# Patient Record
Sex: FEMALE | Race: WHITE | NOT HISPANIC OR LATINO | URBAN - METROPOLITAN AREA
[De-identification: names, ages, dates, MRNs, and addresses within clinical notes are randomized per-mention and may not be internally consistent; named-entity substitution may affect disease eponyms.]

---

## 2017-02-05 ENCOUNTER — EMERGENCY (EMERGENCY)
Facility: HOSPITAL | Age: 72
LOS: 1 days | Discharge: HOME | End: 2017-02-05
Attending: EMERGENCY MEDICINE
Payer: MEDICARE

## 2017-02-05 VITALS
SYSTOLIC BLOOD PRESSURE: 173 MMHG | HEART RATE: 72 BPM | DIASTOLIC BLOOD PRESSURE: 112 MMHG | TEMPERATURE: 97 F | RESPIRATION RATE: 20 BRPM | OXYGEN SATURATION: 100 %

## 2017-02-05 VITALS — WEIGHT: 139.99 LBS

## 2017-02-05 DIAGNOSIS — R20.2 PARESTHESIA OF SKIN: ICD-10-CM

## 2017-02-05 DIAGNOSIS — R79.82 ELEVATED C-REACTIVE PROTEIN (CRP): ICD-10-CM

## 2017-02-05 DIAGNOSIS — Z79.899 OTHER LONG TERM (CURRENT) DRUG THERAPY: ICD-10-CM

## 2017-02-05 DIAGNOSIS — R20.0 ANESTHESIA OF SKIN: ICD-10-CM

## 2017-02-05 DIAGNOSIS — R51 HEADACHE: ICD-10-CM

## 2017-02-05 DIAGNOSIS — Z98.2 PRESENCE OF CEREBROSPINAL FLUID DRAINAGE DEVICE: Chronic | ICD-10-CM

## 2017-02-05 DIAGNOSIS — Z98.890 OTHER SPECIFIED POSTPROCEDURAL STATES: ICD-10-CM

## 2017-02-05 LAB
ALBUMIN SERPL ELPH-MCNC: 4.4 G/DL — SIGNIFICANT CHANGE UP (ref 3.3–5.2)
ALP SERPL-CCNC: 56 U/L — SIGNIFICANT CHANGE UP (ref 40–120)
ALT FLD-CCNC: 14 U/L — SIGNIFICANT CHANGE UP
ANION GAP SERPL CALC-SCNC: 11 MMOL/L — SIGNIFICANT CHANGE UP (ref 5–17)
APTT BLD: 31.8 SEC — SIGNIFICANT CHANGE UP (ref 27.5–37.4)
AST SERPL-CCNC: 15 U/L — SIGNIFICANT CHANGE UP
BASOPHILS # BLD AUTO: 0 K/UL — SIGNIFICANT CHANGE UP (ref 0–0.2)
BASOPHILS NFR BLD AUTO: 0.8 % — SIGNIFICANT CHANGE UP (ref 0–2)
BILIRUB SERPL-MCNC: 0.3 MG/DL — LOW (ref 0.4–2)
BUN SERPL-MCNC: 18 MG/DL — SIGNIFICANT CHANGE UP (ref 8–20)
CALCIUM SERPL-MCNC: 10.6 MG/DL — HIGH (ref 8.6–10.2)
CHLORIDE SERPL-SCNC: 101 MMOL/L — SIGNIFICANT CHANGE UP (ref 98–107)
CO2 SERPL-SCNC: 30 MMOL/L — HIGH (ref 22–29)
CREAT SERPL-MCNC: 0.63 MG/DL — SIGNIFICANT CHANGE UP (ref 0.5–1.3)
EOSINOPHIL # BLD AUTO: 0.1 K/UL — SIGNIFICANT CHANGE UP (ref 0–0.5)
EOSINOPHIL NFR BLD AUTO: 1.9 % — SIGNIFICANT CHANGE UP (ref 0–6)
GLUCOSE SERPL-MCNC: 90 MG/DL — SIGNIFICANT CHANGE UP (ref 70–115)
HCT VFR BLD CALC: 41.2 % — SIGNIFICANT CHANGE UP (ref 37–47)
HGB BLD-MCNC: 14.5 G/DL — SIGNIFICANT CHANGE UP (ref 12–16)
INR BLD: 0.99 RATIO — SIGNIFICANT CHANGE UP (ref 0.88–1.16)
LYMPHOCYTES # BLD AUTO: 1.2 K/UL — SIGNIFICANT CHANGE UP (ref 1–4.8)
LYMPHOCYTES # BLD AUTO: 23.6 % — SIGNIFICANT CHANGE UP (ref 20–55)
MCHC RBC-ENTMCNC: 29.9 PG — SIGNIFICANT CHANGE UP (ref 27–31)
MCHC RBC-ENTMCNC: 35.2 G/DL — SIGNIFICANT CHANGE UP (ref 32–36)
MCV RBC AUTO: 84.9 FL — SIGNIFICANT CHANGE UP (ref 81–99)
MONOCYTES # BLD AUTO: 0.4 K/UL — SIGNIFICANT CHANGE UP (ref 0–0.8)
MONOCYTES NFR BLD AUTO: 7.5 % — SIGNIFICANT CHANGE UP (ref 3–10)
NEUTROPHILS # BLD AUTO: 3.4 K/UL — SIGNIFICANT CHANGE UP (ref 1.8–8)
NEUTROPHILS NFR BLD AUTO: 66.2 % — SIGNIFICANT CHANGE UP (ref 37–73)
PLATELET # BLD AUTO: 277 K/UL — SIGNIFICANT CHANGE UP (ref 150–400)
POTASSIUM SERPL-MCNC: 3.7 MMOL/L — SIGNIFICANT CHANGE UP (ref 3.5–5.3)
POTASSIUM SERPL-SCNC: 3.7 MMOL/L — SIGNIFICANT CHANGE UP (ref 3.5–5.3)
PROT SERPL-MCNC: 7.4 G/DL — SIGNIFICANT CHANGE UP (ref 6.6–8.7)
PROTHROM AB SERPL-ACNC: 10.9 SEC — SIGNIFICANT CHANGE UP (ref 10–13.1)
RBC # BLD: 4.85 M/UL — SIGNIFICANT CHANGE UP (ref 4.4–5.2)
RBC # FLD: 13 % — SIGNIFICANT CHANGE UP (ref 11–15.6)
SODIUM SERPL-SCNC: 142 MMOL/L — SIGNIFICANT CHANGE UP (ref 135–145)
WBC # BLD: 5.17 K/UL — SIGNIFICANT CHANGE UP (ref 4.8–10.8)
WBC # FLD AUTO: 5.17 K/UL — SIGNIFICANT CHANGE UP (ref 4.8–10.8)

## 2017-02-05 PROCEDURE — 71010: CPT | Mod: 26

## 2017-02-05 PROCEDURE — 70450 CT HEAD/BRAIN W/O DYE: CPT | Mod: 26

## 2017-02-05 PROCEDURE — 99284 EMERGENCY DEPT VISIT MOD MDM: CPT

## 2017-02-05 RX ORDER — ACETAMINOPHEN 500 MG
975 TABLET ORAL ONCE
Qty: 0 | Refills: 0 | Status: COMPLETED | OUTPATIENT
Start: 2017-02-05 | End: 2017-02-05

## 2017-02-05 RX ADMIN — Medication 975 MILLIGRAM(S): at 17:08

## 2017-02-05 NOTE — ED PROVIDER NOTE - PROGRESS NOTE DETAILS
case discussed with dr urena of neurology; pt to be discharged with close follow up with pmd, neurology and neurosurgery dr perez

## 2017-02-05 NOTE — ED PROVIDER NOTE - OBJECTIVE STATEMENT
70 yo female pmh  shunt comes to ed with headache and numbness of  rt hand and b/l thighs for 2 days; pt noted by daughter having numbness of hands in past; ;pt denies any trauma, chest pain or abdominal pain

## 2017-02-05 NOTE — ED ADULT NURSE NOTE - OBJECTIVE STATEMENT
pt presents to ED with dizziness. head pressure and numbness to her right hand last night, pt states same episode happened this afternoon so she came to ED. pt has hx of  shunt. pt denies n/v. no numbness/tingling noted at this time.  +b/l equal motor strengths noted to upper and lower extremities. no facial droop noted. no slurred speech noted. a&ox3 PERRL>  maex4 lungs cta denies chest pain/sob. breathing si even and unlabored. skin w.d.i, will continue to monitor and reassess

## 2017-02-05 NOTE — ED ADULT NURSE NOTE - NS ED NURSE DC INFO COMPLEXITY
Returned Demonstration/Simple: Patient demonstrates quick and easy understanding/Patient asked questions/Verbalized Understanding

## 2017-02-05 NOTE — ED ADULT TRIAGE NOTE - CHIEF COMPLAINT QUOTE
rt hand numbness, both legs heavy, weak. has  shunt hydrocephalus.  head pressure, s/s x 2 days. CVA HX. no chest pain, no dyspnea, pt is alert. from clinic. [facility]

## 2017-02-07 ENCOUNTER — EMERGENCY (EMERGENCY)
Facility: HOSPITAL | Age: 72
LOS: 1 days | Discharge: HOME | End: 2017-02-07
Attending: EMERGENCY MEDICINE
Payer: MEDICARE

## 2017-02-07 VITALS
TEMPERATURE: 98 F | SYSTOLIC BLOOD PRESSURE: 159 MMHG | DIASTOLIC BLOOD PRESSURE: 85 MMHG | RESPIRATION RATE: 18 BRPM | OXYGEN SATURATION: 100 % | HEIGHT: 60 IN | WEIGHT: 143.08 LBS | HEART RATE: 66 BPM

## 2017-02-07 DIAGNOSIS — F41.9 ANXIETY DISORDER, UNSPECIFIED: ICD-10-CM

## 2017-02-07 DIAGNOSIS — R06.4 HYPERVENTILATION: ICD-10-CM

## 2017-02-07 DIAGNOSIS — Z98.2 PRESENCE OF CEREBROSPINAL FLUID DRAINAGE DEVICE: Chronic | ICD-10-CM

## 2017-02-07 DIAGNOSIS — R53.1 WEAKNESS: ICD-10-CM

## 2017-02-07 DIAGNOSIS — Z79.82 LONG TERM (CURRENT) USE OF ASPIRIN: ICD-10-CM

## 2017-02-07 LAB
ALBUMIN SERPL ELPH-MCNC: 4.3 G/DL — SIGNIFICANT CHANGE UP (ref 3.3–5.2)
ALP SERPL-CCNC: 54 U/L — SIGNIFICANT CHANGE UP (ref 40–120)
ALT FLD-CCNC: 13 U/L — SIGNIFICANT CHANGE UP
ANION GAP SERPL CALC-SCNC: 13 MMOL/L — SIGNIFICANT CHANGE UP (ref 5–17)
APPEARANCE UR: CLEAR — SIGNIFICANT CHANGE UP
APTT BLD: 31.3 SEC — SIGNIFICANT CHANGE UP (ref 27.5–37.4)
AST SERPL-CCNC: 13 U/L — SIGNIFICANT CHANGE UP
BACTERIA # UR AUTO: ABNORMAL
BASOPHILS # BLD AUTO: 0 K/UL — SIGNIFICANT CHANGE UP (ref 0–0.2)
BASOPHILS NFR BLD AUTO: 0.2 % — SIGNIFICANT CHANGE UP (ref 0–2)
BILIRUB SERPL-MCNC: 0.5 MG/DL — SIGNIFICANT CHANGE UP (ref 0.4–2)
BILIRUB UR-MCNC: NEGATIVE — SIGNIFICANT CHANGE UP
BUN SERPL-MCNC: 16 MG/DL — SIGNIFICANT CHANGE UP (ref 8–20)
CALCIUM SERPL-MCNC: 10.5 MG/DL — HIGH (ref 8.6–10.2)
CHLORIDE SERPL-SCNC: 99 MMOL/L — SIGNIFICANT CHANGE UP (ref 98–107)
CO2 SERPL-SCNC: 29 MMOL/L — SIGNIFICANT CHANGE UP (ref 22–29)
COLOR SPEC: YELLOW — SIGNIFICANT CHANGE UP
CREAT SERPL-MCNC: 0.67 MG/DL — SIGNIFICANT CHANGE UP (ref 0.5–1.3)
DIFF PNL FLD: NEGATIVE — SIGNIFICANT CHANGE UP
EOSINOPHIL # BLD AUTO: 0 K/UL — SIGNIFICANT CHANGE UP (ref 0–0.5)
EOSINOPHIL NFR BLD AUTO: 0.5 % — SIGNIFICANT CHANGE UP (ref 0–6)
EPI CELLS # UR: SIGNIFICANT CHANGE UP
GLUCOSE SERPL-MCNC: 98 MG/DL — SIGNIFICANT CHANGE UP (ref 70–115)
GLUCOSE UR QL: NEGATIVE MG/DL — SIGNIFICANT CHANGE UP
HCT VFR BLD CALC: 41.6 % — SIGNIFICANT CHANGE UP (ref 37–47)
HGB BLD-MCNC: 13.9 G/DL — SIGNIFICANT CHANGE UP (ref 12–16)
INR BLD: 1 RATIO — SIGNIFICANT CHANGE UP (ref 0.88–1.16)
KETONES UR-MCNC: NEGATIVE — SIGNIFICANT CHANGE UP
LEUKOCYTE ESTERASE UR-ACNC: ABNORMAL
LYMPHOCYTES # BLD AUTO: 1.1 K/UL — SIGNIFICANT CHANGE UP (ref 1–4.8)
LYMPHOCYTES # BLD AUTO: 19.1 % — LOW (ref 20–55)
MCHC RBC-ENTMCNC: 29.3 PG — SIGNIFICANT CHANGE UP (ref 27–31)
MCHC RBC-ENTMCNC: 33.4 G/DL — SIGNIFICANT CHANGE UP (ref 32–36)
MCV RBC AUTO: 87.8 FL — SIGNIFICANT CHANGE UP (ref 81–99)
MONOCYTES # BLD AUTO: 0.5 K/UL — SIGNIFICANT CHANGE UP (ref 0–0.8)
MONOCYTES NFR BLD AUTO: 8.5 % — SIGNIFICANT CHANGE UP (ref 3–10)
NEUTROPHILS # BLD AUTO: 4 K/UL — SIGNIFICANT CHANGE UP (ref 1.8–8)
NEUTROPHILS NFR BLD AUTO: 71.5 % — SIGNIFICANT CHANGE UP (ref 37–73)
NITRITE UR-MCNC: NEGATIVE — SIGNIFICANT CHANGE UP
PH UR: 8 — SIGNIFICANT CHANGE UP (ref 4.8–8)
PLATELET # BLD AUTO: 237 K/UL — SIGNIFICANT CHANGE UP (ref 150–400)
POTASSIUM SERPL-MCNC: 3.7 MMOL/L — SIGNIFICANT CHANGE UP (ref 3.5–5.3)
POTASSIUM SERPL-SCNC: 3.7 MMOL/L — SIGNIFICANT CHANGE UP (ref 3.5–5.3)
PROT SERPL-MCNC: 7.2 G/DL — SIGNIFICANT CHANGE UP (ref 6.6–8.7)
PROT UR-MCNC: NEGATIVE MG/DL — SIGNIFICANT CHANGE UP
PROTHROM AB SERPL-ACNC: 11 SEC — SIGNIFICANT CHANGE UP (ref 10–13.1)
RBC # BLD: 4.74 M/UL — SIGNIFICANT CHANGE UP (ref 4.4–5.2)
RBC # FLD: 12.9 % — SIGNIFICANT CHANGE UP (ref 11–15.6)
RBC CASTS # UR COMP ASSIST: SIGNIFICANT CHANGE UP /HPF (ref 0–4)
SODIUM SERPL-SCNC: 141 MMOL/L — SIGNIFICANT CHANGE UP (ref 135–145)
SP GR SPEC: 1.01 — SIGNIFICANT CHANGE UP (ref 1.01–1.02)
TROPONIN T SERPL-MCNC: <0.01 NG/ML — SIGNIFICANT CHANGE UP (ref 0–0.06)
UROBILINOGEN FLD QL: NEGATIVE MG/DL — SIGNIFICANT CHANGE UP
WBC # BLD: 5.54 K/UL — SIGNIFICANT CHANGE UP (ref 4.8–10.8)
WBC # FLD AUTO: 5.54 K/UL — SIGNIFICANT CHANGE UP (ref 4.8–10.8)
WBC UR QL: SIGNIFICANT CHANGE UP

## 2017-02-07 PROCEDURE — 70450 CT HEAD/BRAIN W/O DYE: CPT

## 2017-02-07 PROCEDURE — 81001 URINALYSIS AUTO W/SCOPE: CPT

## 2017-02-07 PROCEDURE — 85027 COMPLETE CBC AUTOMATED: CPT

## 2017-02-07 PROCEDURE — 70450 CT HEAD/BRAIN W/O DYE: CPT | Mod: 26

## 2017-02-07 PROCEDURE — 85730 THROMBOPLASTIN TIME PARTIAL: CPT

## 2017-02-07 PROCEDURE — 80053 COMPREHEN METABOLIC PANEL: CPT

## 2017-02-07 PROCEDURE — 99284 EMERGENCY DEPT VISIT MOD MDM: CPT | Mod: 25

## 2017-02-07 PROCEDURE — 84484 ASSAY OF TROPONIN QUANT: CPT

## 2017-02-07 PROCEDURE — 85610 PROTHROMBIN TIME: CPT

## 2017-02-07 PROCEDURE — 71045 X-RAY EXAM CHEST 1 VIEW: CPT

## 2017-02-07 PROCEDURE — 99284 EMERGENCY DEPT VISIT MOD MDM: CPT

## 2017-02-07 RX ORDER — HYDROXYZINE HCL 10 MG
1 TABLET ORAL
Qty: 15 | Refills: 0 | OUTPATIENT
Start: 2017-02-07 | End: 2017-02-12

## 2017-02-07 NOTE — ED ADULT NURSE REASSESSMENT NOTE - NS ED NURSE REASSESS COMMENT FT1
Received report from previous shift RN, pt alert/orienetd, waiting to be seen by md, no acute distress noted. will continue to monitor.

## 2017-02-07 NOTE — ED ADULT NURSE NOTE - OBJECTIVE STATEMENT
Pt c/o tingling and weakness to right hand and weakness to upper legs x 4 hours. Pt states she was stirring dinner and had an onset of tingling in her right hand, pt states she is let-handed and was stirring dinner with left hand, right hand was at side. Pt states she then felt a weakness to her upper legs and needed assistance sitting down. As per daughter, pt was seen here on Sunday for the same and was discharged home with dehydration. Pt states she has been drinking but maybe is dehydrated again. Pt appears anxious, respirations are even & labored. BP is slightly elevated. Pt's daughter at bedside, pt waiting to be evaluated by MD. Pt c/o tingling and weakness to right hand and weakness to upper legs x 4 hours. Pt states she was stirring dinner and had an onset of tingling in her right hand, pt states she is let-handed and was stirring dinner with left hand, right hand was at side. Pt states she then felt a weakness to her upper legs and needed assistance sitting down. As per daughter, pt was seen here on Sunday for the same and was discharged home with dehydration. Pt states she has been drinking but maybe is dehydrated again. Pt appears anxious, respirations are even & labored. Pt with equal bilateral hand , no deficits noted. BP is slightly elevated. Pt's daughter at bedside, pt waiting to be evaluated by MD.

## 2017-02-08 ENCOUNTER — APPOINTMENT (OUTPATIENT)
Dept: FAMILY MEDICINE | Facility: CLINIC | Age: 72
End: 2017-02-08

## 2017-02-08 VITALS
SYSTOLIC BLOOD PRESSURE: 139 MMHG | OXYGEN SATURATION: 98 % | HEART RATE: 66 BPM | DIASTOLIC BLOOD PRESSURE: 70 MMHG | RESPIRATION RATE: 18 BRPM | TEMPERATURE: 98 F

## 2017-02-08 VITALS
DIASTOLIC BLOOD PRESSURE: 75 MMHG | HEART RATE: 74 BPM | BODY MASS INDEX: 28.86 KG/M2 | SYSTOLIC BLOOD PRESSURE: 130 MMHG | TEMPERATURE: 98.2 F | WEIGHT: 147 LBS | HEIGHT: 60 IN

## 2017-02-08 DIAGNOSIS — M65.30 TRIGGER FINGER, UNSPECIFIED FINGER: ICD-10-CM

## 2017-02-09 ENCOUNTER — TRANSCRIPTION ENCOUNTER (OUTPATIENT)
Age: 72
End: 2017-02-09

## 2017-02-09 NOTE — ED PROVIDER NOTE - OBJECTIVE STATEMENT
70 yo female pmh of  shunt comes to ed with feeling anxious with sob , numbness and tingling to upper and lower extremities; pt denies headache nausea or vomiting; pt noted improved while in ed

## 2017-02-10 ENCOUNTER — TRANSCRIPTION ENCOUNTER (OUTPATIENT)
Age: 72
End: 2017-02-10

## 2017-02-20 PROBLEM — M65.30 TRIGGER FINGER: Status: ACTIVE | Noted: 2017-02-20

## 2017-03-01 ENCOUNTER — APPOINTMENT (OUTPATIENT)
Dept: FAMILY MEDICINE | Facility: CLINIC | Age: 72
End: 2017-03-01

## 2017-03-01 VITALS
HEIGHT: 60 IN | WEIGHT: 148 LBS | DIASTOLIC BLOOD PRESSURE: 84 MMHG | SYSTOLIC BLOOD PRESSURE: 142 MMHG | BODY MASS INDEX: 29.06 KG/M2 | HEART RATE: 72 BPM

## 2017-03-02 ENCOUNTER — MEDICATION RENEWAL (OUTPATIENT)
Age: 72
End: 2017-03-02

## 2017-03-07 ENCOUNTER — APPOINTMENT (OUTPATIENT)
Dept: OBGYN | Facility: CLINIC | Age: 72
End: 2017-03-07

## 2017-03-07 VITALS
WEIGHT: 147 LBS | SYSTOLIC BLOOD PRESSURE: 140 MMHG | BODY MASS INDEX: 28.86 KG/M2 | DIASTOLIC BLOOD PRESSURE: 80 MMHG | HEIGHT: 60 IN

## 2017-04-10 ENCOUNTER — APPOINTMENT (OUTPATIENT)
Dept: ORTHOPEDIC SURGERY | Facility: CLINIC | Age: 72
End: 2017-04-10

## 2017-04-10 VITALS
WEIGHT: 147 LBS | HEIGHT: 60 IN | SYSTOLIC BLOOD PRESSURE: 181 MMHG | HEART RATE: 71 BPM | BODY MASS INDEX: 28.86 KG/M2 | DIASTOLIC BLOOD PRESSURE: 97 MMHG

## 2017-04-13 ENCOUNTER — APPOINTMENT (OUTPATIENT)
Dept: FAMILY MEDICINE | Facility: CLINIC | Age: 72
End: 2017-04-13

## 2017-04-13 VITALS
HEIGHT: 60 IN | WEIGHT: 150 LBS | SYSTOLIC BLOOD PRESSURE: 138 MMHG | BODY MASS INDEX: 29.45 KG/M2 | HEART RATE: 72 BPM | DIASTOLIC BLOOD PRESSURE: 80 MMHG

## 2017-04-16 LAB — HEMOCCULT STL QL IA: NEGATIVE

## 2017-06-01 ENCOUNTER — APPOINTMENT (OUTPATIENT)
Dept: CT IMAGING | Facility: CLINIC | Age: 72
End: 2017-06-01

## 2017-06-01 ENCOUNTER — OUTPATIENT (OUTPATIENT)
Dept: OUTPATIENT SERVICES | Facility: HOSPITAL | Age: 72
LOS: 1 days | End: 2017-06-01
Payer: MEDICARE

## 2017-06-01 DIAGNOSIS — Z98.2 PRESENCE OF CEREBROSPINAL FLUID DRAINAGE DEVICE: Chronic | ICD-10-CM

## 2017-06-01 DIAGNOSIS — Z00.8 ENCOUNTER FOR OTHER GENERAL EXAMINATION: ICD-10-CM

## 2017-06-01 PROCEDURE — 70450 CT HEAD/BRAIN W/O DYE: CPT

## 2017-06-07 ENCOUNTER — APPOINTMENT (OUTPATIENT)
Dept: FAMILY MEDICINE | Facility: CLINIC | Age: 72
End: 2017-06-07

## 2017-06-07 VITALS
HEIGHT: 60 IN | DIASTOLIC BLOOD PRESSURE: 80 MMHG | BODY MASS INDEX: 29.64 KG/M2 | HEART RATE: 76 BPM | SYSTOLIC BLOOD PRESSURE: 138 MMHG | WEIGHT: 151 LBS

## 2017-08-02 ENCOUNTER — APPOINTMENT (OUTPATIENT)
Dept: FAMILY MEDICINE | Facility: CLINIC | Age: 72
End: 2017-08-02
Payer: MEDICARE

## 2017-08-02 VITALS
HEIGHT: 60 IN | SYSTOLIC BLOOD PRESSURE: 130 MMHG | WEIGHT: 149 LBS | DIASTOLIC BLOOD PRESSURE: 80 MMHG | HEART RATE: 70 BPM | BODY MASS INDEX: 29.25 KG/M2

## 2017-08-02 LAB
25(OH)D3 SERPL-MCNC: 36.2 NG/ML
ALBUMIN SERPL ELPH-MCNC: 4.3 G/DL
ALP BLD-CCNC: 51 U/L
ALT SERPL-CCNC: 12 U/L
ANION GAP SERPL CALC-SCNC: 11 MMOL/L
AST SERPL-CCNC: 13 U/L
BASOPHILS # BLD AUTO: 0.03 K/UL
BASOPHILS NFR BLD AUTO: 0.8 %
BILIRUB SERPL-MCNC: 0.4 MG/DL
BUN SERPL-MCNC: 17 MG/DL
CALCIUM SERPL-MCNC: 10.2 MG/DL
CHLORIDE SERPL-SCNC: 105 MMOL/L
CHOLEST SERPL-MCNC: 242 MG/DL
CHOLEST/HDLC SERPL: 3.1 RATIO
CO2 SERPL-SCNC: 27 MMOL/L
CREAT SERPL-MCNC: 0.83 MG/DL
EOSINOPHIL # BLD AUTO: 0.09 K/UL
EOSINOPHIL NFR BLD AUTO: 2.4 %
GLUCOSE SERPL-MCNC: 90 MG/DL
HCT VFR BLD CALC: 41.2 %
HDLC SERPL-MCNC: 79 MG/DL
HGB BLD-MCNC: 13.5 G/DL
IMM GRANULOCYTES NFR BLD AUTO: 0.3 %
LDLC SERPL CALC-MCNC: 138 MG/DL
LYMPHOCYTES # BLD AUTO: 0.87 K/UL
LYMPHOCYTES NFR BLD AUTO: 23.5 %
MAN DIFF?: NORMAL
MCHC RBC-ENTMCNC: 28.6 PG
MCHC RBC-ENTMCNC: 32.8 GM/DL
MCV RBC AUTO: 87.3 FL
MONOCYTES # BLD AUTO: 0.3 K/UL
MONOCYTES NFR BLD AUTO: 8.1 %
NEUTROPHILS # BLD AUTO: 2.41 K/UL
NEUTROPHILS NFR BLD AUTO: 64.9 %
PLATELET # BLD AUTO: 265 K/UL
POTASSIUM SERPL-SCNC: 4.1 MMOL/L
PROT SERPL-MCNC: 6.8 G/DL
RBC # BLD: 4.72 M/UL
RBC # FLD: 13.2 %
SODIUM SERPL-SCNC: 143 MMOL/L
T4 FREE SERPL-MCNC: 1.2 NG/DL
TRIGL SERPL-MCNC: 124 MG/DL
TSH SERPL-ACNC: 1.05 UIU/ML
WBC # FLD AUTO: 3.71 K/UL

## 2017-08-02 PROCEDURE — 99214 OFFICE O/P EST MOD 30 MIN: CPT

## 2017-10-13 ENCOUNTER — APPOINTMENT (OUTPATIENT)
Dept: UROLOGY | Facility: CLINIC | Age: 72
End: 2017-10-13
Payer: MEDICARE

## 2017-10-13 VITALS
OXYGEN SATURATION: 98 % | DIASTOLIC BLOOD PRESSURE: 60 MMHG | TEMPERATURE: 98 F | HEART RATE: 70 BPM | SYSTOLIC BLOOD PRESSURE: 132 MMHG

## 2017-10-13 PROCEDURE — 99212 OFFICE O/P EST SF 10 MIN: CPT

## 2017-10-17 ENCOUNTER — MEDICATION RENEWAL (OUTPATIENT)
Age: 72
End: 2017-10-17

## 2017-10-31 ENCOUNTER — FORM ENCOUNTER (OUTPATIENT)
Age: 72
End: 2017-10-31

## 2017-11-01 ENCOUNTER — APPOINTMENT (OUTPATIENT)
Dept: FAMILY MEDICINE | Facility: CLINIC | Age: 72
End: 2017-11-01
Payer: MEDICARE

## 2017-11-01 ENCOUNTER — OUTPATIENT (OUTPATIENT)
Dept: OUTPATIENT SERVICES | Facility: HOSPITAL | Age: 72
LOS: 1 days | End: 2017-11-01
Payer: MEDICARE

## 2017-11-01 ENCOUNTER — APPOINTMENT (OUTPATIENT)
Dept: RADIOLOGY | Facility: CLINIC | Age: 72
End: 2017-11-01

## 2017-11-01 VITALS
WEIGHT: 149 LBS | SYSTOLIC BLOOD PRESSURE: 109 MMHG | DIASTOLIC BLOOD PRESSURE: 70 MMHG | TEMPERATURE: 98.6 F | OXYGEN SATURATION: 98 % | HEIGHT: 60 IN | BODY MASS INDEX: 29.25 KG/M2 | HEART RATE: 83 BPM

## 2017-11-01 DIAGNOSIS — M54.5 LOW BACK PAIN: ICD-10-CM

## 2017-11-01 DIAGNOSIS — Z98.2 PRESENCE OF CEREBROSPINAL FLUID DRAINAGE DEVICE: Chronic | ICD-10-CM

## 2017-11-01 PROCEDURE — 99214 OFFICE O/P EST MOD 30 MIN: CPT | Mod: 25

## 2017-11-01 PROCEDURE — 72100 X-RAY EXAM L-S SPINE 2/3 VWS: CPT | Mod: 26

## 2017-11-01 PROCEDURE — 90686 IIV4 VACC NO PRSV 0.5 ML IM: CPT

## 2017-11-01 PROCEDURE — 72100 X-RAY EXAM L-S SPINE 2/3 VWS: CPT

## 2017-11-01 PROCEDURE — G0008: CPT

## 2017-11-08 ENCOUNTER — OTHER (OUTPATIENT)
Age: 72
End: 2017-11-08

## 2017-12-12 ENCOUNTER — EMERGENCY (EMERGENCY)
Facility: HOSPITAL | Age: 72
LOS: 1 days | Discharge: HOME | End: 2017-12-12
Attending: EMERGENCY MEDICINE
Payer: MEDICARE

## 2017-12-12 VITALS
OXYGEN SATURATION: 92 % | SYSTOLIC BLOOD PRESSURE: 177 MMHG | DIASTOLIC BLOOD PRESSURE: 97 MMHG | HEART RATE: 80 BPM | TEMPERATURE: 98 F

## 2017-12-12 VITALS — WEIGHT: 151.02 LBS | HEIGHT: 60 IN

## 2017-12-12 DIAGNOSIS — Z98.2 PRESENCE OF CEREBROSPINAL FLUID DRAINAGE DEVICE: Chronic | ICD-10-CM

## 2017-12-12 PROCEDURE — 99284 EMERGENCY DEPT VISIT MOD MDM: CPT

## 2017-12-12 PROCEDURE — 99284 EMERGENCY DEPT VISIT MOD MDM: CPT | Mod: 25

## 2017-12-12 PROCEDURE — 70450 CT HEAD/BRAIN W/O DYE: CPT | Mod: 26

## 2017-12-12 PROCEDURE — 70450 CT HEAD/BRAIN W/O DYE: CPT

## 2017-12-12 RX ORDER — SODIUM CHLORIDE 9 MG/ML
500 INJECTION INTRAMUSCULAR; INTRAVENOUS; SUBCUTANEOUS ONCE
Qty: 0 | Refills: 0 | Status: DISCONTINUED | OUTPATIENT
Start: 2017-12-12 | End: 2017-12-12

## 2017-12-12 RX ORDER — ASCORBIC ACID 60 MG
1 TABLET,CHEWABLE ORAL
Qty: 0 | Refills: 0 | COMMUNITY

## 2017-12-12 RX ORDER — ACETAMINOPHEN 500 MG
2 TABLET ORAL
Qty: 42 | Refills: 0 | OUTPATIENT
Start: 2017-12-12 | End: 2017-12-18

## 2017-12-12 RX ORDER — ASPIRIN/CALCIUM CARB/MAGNESIUM 324 MG
0 TABLET ORAL
Qty: 0 | Refills: 0 | COMMUNITY

## 2017-12-12 RX ORDER — KETOROLAC TROMETHAMINE 30 MG/ML
30 SYRINGE (ML) INJECTION ONCE
Qty: 0 | Refills: 0 | Status: DISCONTINUED | OUTPATIENT
Start: 2017-12-12 | End: 2017-12-12

## 2017-12-12 RX ORDER — SERTRALINE 25 MG/1
1 TABLET, FILM COATED ORAL
Qty: 0 | Refills: 0 | COMMUNITY

## 2017-12-12 RX ORDER — CHOLECALCIFEROL (VITAMIN D3) 125 MCG
1 CAPSULE ORAL
Qty: 0 | Refills: 0 | COMMUNITY

## 2017-12-12 RX ORDER — OMEGA-3 ACID ETHYL ESTERS 1 G
0 CAPSULE ORAL
Qty: 0 | Refills: 0 | COMMUNITY

## 2017-12-12 RX ORDER — TRIAMTERENE 100 MG/1
0 CAPSULE ORAL
Qty: 0 | Refills: 0 | COMMUNITY

## 2017-12-12 RX ORDER — ACETAMINOPHEN 500 MG
975 TABLET ORAL ONCE
Qty: 0 | Refills: 0 | Status: COMPLETED | OUTPATIENT
Start: 2017-12-12 | End: 2017-12-12

## 2017-12-12 RX ORDER — SODIUM CHLORIDE 9 MG/ML
1000 INJECTION INTRAMUSCULAR; INTRAVENOUS; SUBCUTANEOUS
Qty: 0 | Refills: 0 | Status: DISCONTINUED | OUTPATIENT
Start: 2017-12-12 | End: 2017-12-12

## 2017-12-12 RX ORDER — ONDANSETRON 8 MG/1
4 TABLET, FILM COATED ORAL ONCE
Qty: 0 | Refills: 0 | Status: DISCONTINUED | OUTPATIENT
Start: 2017-12-12 | End: 2017-12-12

## 2017-12-12 RX ADMIN — Medication 975 MILLIGRAM(S): at 20:37

## 2017-12-12 NOTE — ED PROVIDER NOTE - PROGRESS NOTE DETAILS
ct head neg for bleed--notes feels better sp tylenol will dc - pt has a neurosx and neurologist outpatient will fu with them

## 2017-12-12 NOTE — ED PROVIDER NOTE - OBJECTIVE STATEMENT
71yo F hx of hydrocephalus sp  shun in 1997 revised in 2004 then spontaneous head bleed in 2005, htn p/w headache x 3 days. Denies f/c/n/v/cp/sob/palpitations/ cough/rash//dizziness/abd.pain/d/c/dysuria/hematuria. pt worried for another head bleed as notes that also felt a little bit of pressure at that time. no urinary incontinence but notes that takes water pills and goes often. no trauma

## 2017-12-12 NOTE — ED ADULT NURSE NOTE - OBJECTIVE STATEMENT
Pt states "I have a shunt in my brain that was placed in 2004 and I've had a headache for 3 days, taking aleve and tylenol which helps but it always comes back around 6pm", pt denied n/v, denies dizziness/weakness, able to ambulate safely and steadily w/out assistance, no resp distress noted

## 2017-12-13 ENCOUNTER — APPOINTMENT (OUTPATIENT)
Dept: FAMILY MEDICINE | Facility: CLINIC | Age: 72
End: 2017-12-13
Payer: MEDICARE

## 2017-12-13 VITALS
BODY MASS INDEX: 29.45 KG/M2 | HEIGHT: 60 IN | DIASTOLIC BLOOD PRESSURE: 100 MMHG | WEIGHT: 150 LBS | HEART RATE: 76 BPM | TEMPERATURE: 98.5 F | OXYGEN SATURATION: 98 % | SYSTOLIC BLOOD PRESSURE: 150 MMHG

## 2017-12-13 VITALS — SYSTOLIC BLOOD PRESSURE: 140 MMHG | DIASTOLIC BLOOD PRESSURE: 95 MMHG

## 2017-12-13 DIAGNOSIS — R51 HEADACHE: ICD-10-CM

## 2017-12-13 PROCEDURE — 99214 OFFICE O/P EST MOD 30 MIN: CPT | Mod: 25

## 2017-12-13 PROCEDURE — 36415 COLL VENOUS BLD VENIPUNCTURE: CPT

## 2017-12-17 PROBLEM — R51 HEADACHE: Status: ACTIVE | Noted: 2017-12-17

## 2017-12-21 LAB
ALBUMIN SERPL ELPH-MCNC: 4.5 G/DL
ALP BLD-CCNC: 63 U/L
ALT SERPL-CCNC: 20 U/L
ANION GAP SERPL CALC-SCNC: 16 MMOL/L
AST SERPL-CCNC: 19 U/L
BASOPHILS # BLD AUTO: 0.03 K/UL
BASOPHILS NFR BLD AUTO: 0.6 %
BILIRUB SERPL-MCNC: 0.2 MG/DL
BUN SERPL-MCNC: 22 MG/DL
CALCIUM SERPL-MCNC: 10.4 MG/DL
CHLORIDE SERPL-SCNC: 100 MMOL/L
CO2 SERPL-SCNC: 26 MMOL/L
CREAT SERPL-MCNC: 0.9 MG/DL
EOSINOPHIL # BLD AUTO: 0.07 K/UL
EOSINOPHIL NFR BLD AUTO: 1.3 %
GLUCOSE SERPL-MCNC: 94 MG/DL
HCT VFR BLD CALC: 40.8 %
HGB BLD-MCNC: 14 G/DL
IMM GRANULOCYTES NFR BLD AUTO: 0 %
LYMPHOCYTES # BLD AUTO: 0.98 K/UL
LYMPHOCYTES NFR BLD AUTO: 18.5 %
MAN DIFF?: NORMAL
MCHC RBC-ENTMCNC: 29.4 PG
MCHC RBC-ENTMCNC: 34.3 GM/DL
MCV RBC AUTO: 85.5 FL
MONOCYTES # BLD AUTO: 0.42 K/UL
MONOCYTES NFR BLD AUTO: 7.9 %
NEUTROPHILS # BLD AUTO: 3.8 K/UL
NEUTROPHILS NFR BLD AUTO: 71.7 %
PLATELET # BLD AUTO: 285 K/UL
POTASSIUM SERPL-SCNC: 4 MMOL/L
PROT SERPL-MCNC: 7.2 G/DL
RBC # BLD: 4.77 M/UL
RBC # FLD: 12.6 %
SODIUM SERPL-SCNC: 142 MMOL/L
WBC # FLD AUTO: 5.3 K/UL

## 2018-02-20 ENCOUNTER — APPOINTMENT (OUTPATIENT)
Dept: FAMILY MEDICINE | Facility: CLINIC | Age: 73
End: 2018-02-20
Payer: MEDICARE

## 2018-02-20 VITALS
WEIGHT: 150.38 LBS | OXYGEN SATURATION: 92 % | BODY MASS INDEX: 30.32 KG/M2 | DIASTOLIC BLOOD PRESSURE: 68 MMHG | HEART RATE: 85 BPM | SYSTOLIC BLOOD PRESSURE: 122 MMHG | HEIGHT: 59 IN

## 2018-02-20 DIAGNOSIS — Z92.29 PERSONAL HISTORY OF OTHER DRUG THERAPY: ICD-10-CM

## 2018-02-20 PROCEDURE — 99214 OFFICE O/P EST MOD 30 MIN: CPT

## 2018-02-25 ENCOUNTER — RX RENEWAL (OUTPATIENT)
Age: 73
End: 2018-02-25

## 2018-03-08 ENCOUNTER — APPOINTMENT (OUTPATIENT)
Dept: OBGYN | Facility: CLINIC | Age: 73
End: 2018-03-08
Payer: MEDICARE

## 2018-03-08 VITALS
WEIGHT: 150 LBS | BODY MASS INDEX: 30.24 KG/M2 | HEIGHT: 59 IN | SYSTOLIC BLOOD PRESSURE: 126 MMHG | DIASTOLIC BLOOD PRESSURE: 62 MMHG

## 2018-03-08 DIAGNOSIS — N95.2 POSTMENOPAUSAL ATROPHIC VAGINITIS: ICD-10-CM

## 2018-03-08 PROCEDURE — G0101: CPT

## 2018-03-08 PROCEDURE — 82270 OCCULT BLOOD FECES: CPT

## 2018-04-06 ENCOUNTER — APPOINTMENT (OUTPATIENT)
Dept: NEUROLOGY | Facility: CLINIC | Age: 73
End: 2018-04-06
Payer: MEDICARE

## 2018-04-06 VITALS
DIASTOLIC BLOOD PRESSURE: 82 MMHG | SYSTOLIC BLOOD PRESSURE: 128 MMHG | HEIGHT: 60 IN | BODY MASS INDEX: 29.45 KG/M2 | WEIGHT: 150 LBS

## 2018-04-06 PROCEDURE — 99213 OFFICE O/P EST LOW 20 MIN: CPT

## 2018-08-14 PROBLEM — F32.9 MAJOR DEPRESSIVE DISORDER, SINGLE EPISODE, UNSPECIFIED: Chronic | Status: ACTIVE | Noted: 2017-12-12

## 2018-08-14 PROBLEM — Z98.2 PRESENCE OF CEREBROSPINAL FLUID DRAINAGE DEVICE: Chronic | Status: ACTIVE | Noted: 2017-12-12

## 2018-08-29 ENCOUNTER — MEDICATION RENEWAL (OUTPATIENT)
Age: 73
End: 2018-08-29

## 2018-09-05 ENCOUNTER — APPOINTMENT (OUTPATIENT)
Dept: FAMILY MEDICINE | Facility: CLINIC | Age: 73
End: 2018-09-05
Payer: MEDICARE

## 2018-09-05 VITALS
OXYGEN SATURATION: 97 % | BODY MASS INDEX: 31.41 KG/M2 | HEART RATE: 74 BPM | SYSTOLIC BLOOD PRESSURE: 126 MMHG | WEIGHT: 160 LBS | DIASTOLIC BLOOD PRESSURE: 72 MMHG | HEIGHT: 60 IN

## 2018-09-05 PROCEDURE — 99214 OFFICE O/P EST MOD 30 MIN: CPT

## 2018-09-05 NOTE — COUNSELING
[Healthy eating counseling provided] : healthy eating [Activity counseling provided] : activity [Behavioral health counseling provided] : behavioral health  [Low Fat Diet] : Low fat diet [Low Salt Diet] : Low salt diet [Walking] : Walking [Engage in a relaxing activity] : Engage in a relaxing activity [None] : None [Good understanding] : Patient has a good understanding of lifestyle changes and the steps needed to achieve self management goals

## 2018-09-05 NOTE — PHYSICAL EXAM
[No Acute Distress] : no acute distress [Well Nourished] : well nourished [Well-Appearing] : well-appearing [Normal Sclera/Conjunctiva] : normal sclera/conjunctiva [PERRL] : pupils equal round and reactive to light [Supple] : supple [No Lymphadenopathy] : no lymphadenopathy [No Respiratory Distress] : no respiratory distress  [Clear to Auscultation] : lungs were clear to auscultation bilaterally [No Accessory Muscle Use] : no accessory muscle use [Normal Rate] : normal rate  [Regular Rhythm] : with a regular rhythm [Normal S1, S2] : normal S1 and S2 [Soft] : abdomen soft [Non Tender] : non-tender [Normal Bowel Sounds] : normal bowel sounds [Speech Grossly Normal] : speech grossly normal [Normal Mood] : the mood was normal [Normal Insight/Judgement] : insight and judgment were intact

## 2018-09-05 NOTE — PLAN
[FreeTextEntry1] : HAD RECENT LAB WORK WITH CARDIOLOGIST INCLUDING LIPIDS - JUST DONE\par FOLLOW-UP FOR PHYSICAL AND ADDITIONAL LAB WORK WILL BE DISCUSSED AT THAT TIME\par BLOOD PRESSURE CONTROLLED\par HEALTHY DIET AND LIFESTYLE MODIFICATIONS\par FALL PRECAUTIONS\par FLU VACCINE IN THE FALL RECOMMENDED\par DOING WELL ON CURRENT DOSAGE OF ZOLOFT\par STRESS REDUCTION, GOOD SUPPORT SYSTEM\par \par

## 2018-09-05 NOTE — HISTORY OF PRESENT ILLNESS
[de-identified] : PRESENTING FOR FOLLOW-UP.  CHRONIC HISTORY OF HYPERTENSION, HYPERLIPIDEMIA, VITAMIN D INSUFFICIENCY, OSTEOPOROSIS AND ANXIETY.  SAW GYN DR. SPRINGER FOR YEARLY EXAM MARCH 2018.  DUE TO GO BACK TO UROLOGY IN OCTOBER IN FOLLOW-UP OF URINARY FREQUENCY.  SAW CARDIOLOGY LAST MONTH.  NOW OFF DYAZIDE.  ON DIOVAN HCTZ.  BLOOD PRESSURE WELL CONTROLLED.  ON ZOLOFT FOR ANXIETY.  NOT FEELING DOWN OR DEPRESSED.  NO PANIC ATTACKS.  GOOD SUPPORT FROM FAMILY AND FRIENDS.  SOCIALIZING WITH FAMILY AND FRIENDS.  NO SUICIDAL/HOMICIDAL IDEATIONS OR PLANS.  SLEEPING "LIKE A BABY".  NO FALLS.   HAS HAD NO HEADACHE, DIZZINESS, CHEST PAIN, SHORTNESS OF BREATH OR GI COMPLAINTS.  NO OTHER COMPLAINTS TODAY.

## 2018-09-05 NOTE — REVIEW OF SYSTEMS
[Fever] : no fever [Fatigue] : no fatigue [Chest Pain] : no chest pain [Palpitations] : no palpitations [Shortness Of Breath] : no shortness of breath [Cough] : no cough [Abdominal Pain] : no abdominal pain [Nausea] : no nausea [Diarrhea] : diarrhea [Vomiting] : no vomiting [Melena] : no melena [Headache] : no headache [Dizziness] : no dizziness [Fainting] : no fainting [Suicidal] : not suicidal [Depression] : no depression [Easy Bleeding] : no easy bleeding [Easy Bruising] : no easy bruising

## 2018-09-12 ENCOUNTER — APPOINTMENT (OUTPATIENT)
Dept: UROLOGY | Facility: CLINIC | Age: 73
End: 2018-09-12
Payer: MEDICARE

## 2018-09-12 VITALS
HEART RATE: 67 BPM | WEIGHT: 160 LBS | BODY MASS INDEX: 31.41 KG/M2 | HEIGHT: 60 IN | DIASTOLIC BLOOD PRESSURE: 78 MMHG | SYSTOLIC BLOOD PRESSURE: 133 MMHG

## 2018-09-12 DIAGNOSIS — D30.01 BENIGN NEOPLASM OF RIGHT KIDNEY: ICD-10-CM

## 2018-09-12 LAB
BILIRUB UR QL STRIP: NORMAL
CLARITY UR: CLEAR
COLLECTION METHOD: NORMAL
GLUCOSE UR-MCNC: NORMAL
HCG UR QL: 0.2 EU/DL
HGB UR QL STRIP.AUTO: NORMAL
KETONES UR-MCNC: NORMAL
LEUKOCYTE ESTERASE UR QL STRIP: NORMAL
NITRITE UR QL STRIP: NORMAL
PH UR STRIP: 7
PROT UR STRIP-MCNC: NORMAL
SP GR UR STRIP: 1.02

## 2018-09-12 PROCEDURE — 99213 OFFICE O/P EST LOW 20 MIN: CPT | Mod: 25

## 2018-09-12 PROCEDURE — 81003 URINALYSIS AUTO W/O SCOPE: CPT | Mod: QW

## 2018-09-12 RX ORDER — VALSARTAN AND HYDROCHLOROTHIAZIDE 160; 25 MG/1; MG/1
160-25 TABLET, FILM COATED ORAL DAILY
Qty: 90 | Refills: 1 | Status: DISCONTINUED | COMMUNITY
End: 2018-09-12

## 2018-09-12 RX ORDER — VALSARTAN AND HYDROCHLOROTHIAZIDE 160; 25 MG/1; MG/1
160-25 TABLET, FILM COATED ORAL
Refills: 0 | Status: DISCONTINUED | COMMUNITY
End: 2018-09-12

## 2018-09-28 ENCOUNTER — APPOINTMENT (OUTPATIENT)
Dept: FAMILY MEDICINE | Facility: CLINIC | Age: 73
End: 2018-09-28
Payer: MEDICARE

## 2018-09-28 VITALS
WEIGHT: 158 LBS | HEART RATE: 84 BPM | TEMPERATURE: 98.4 F | SYSTOLIC BLOOD PRESSURE: 130 MMHG | DIASTOLIC BLOOD PRESSURE: 70 MMHG | BODY MASS INDEX: 31.02 KG/M2 | HEIGHT: 60 IN

## 2018-09-28 PROCEDURE — 99213 OFFICE O/P EST LOW 20 MIN: CPT

## 2018-09-28 RX ORDER — TERAZOSIN 2 MG/1
2 CAPSULE ORAL
Qty: 30 | Refills: 3 | Status: DISCONTINUED | COMMUNITY
Start: 2017-10-13 | End: 2018-09-28

## 2018-09-29 ENCOUNTER — APPOINTMENT (OUTPATIENT)
Dept: ORTHOPEDIC SURGERY | Facility: CLINIC | Age: 73
End: 2018-09-29
Payer: MEDICARE

## 2018-09-29 VITALS
HEART RATE: 83 BPM | SYSTOLIC BLOOD PRESSURE: 129 MMHG | HEIGHT: 60 IN | BODY MASS INDEX: 31.02 KG/M2 | WEIGHT: 158 LBS | DIASTOLIC BLOOD PRESSURE: 74 MMHG

## 2018-09-29 DIAGNOSIS — Z78.9 OTHER SPECIFIED HEALTH STATUS: ICD-10-CM

## 2018-09-29 DIAGNOSIS — M70.62 TROCHANTERIC BURSITIS, LEFT HIP: ICD-10-CM

## 2018-09-29 PROCEDURE — 72170 X-RAY EXAM OF PELVIS: CPT

## 2018-09-29 PROCEDURE — 99214 OFFICE O/P EST MOD 30 MIN: CPT | Mod: 25

## 2018-09-29 PROCEDURE — 20610 DRAIN/INJ JOINT/BURSA W/O US: CPT | Mod: LT

## 2018-09-29 PROCEDURE — 72100 X-RAY EXAM L-S SPINE 2/3 VWS: CPT

## 2018-09-29 NOTE — REVIEW OF SYSTEMS
[Nasal Discharge] : nasal discharge [Postnasal Drip] : postnasal drip [Fever] : no fever [Fatigue] : no fatigue [Earache] : no earache [Chest Pain] : no chest pain [Palpitations] : no palpitations [Shortness Of Breath] : no shortness of breath [Cough] : no cough

## 2018-09-29 NOTE — HISTORY OF PRESENT ILLNESS
[FreeTextEntry8] : PRESENTING FOR ACUTE VISIT.  SYMPTOMS STARTED YESTERDAY WITH POSTNASAL DRIP AND THROAT IRRITATION. NO FEVER OR COUGH.  HAS HAD NO KNOWN SICK CONTACTS.  NO MEDICATIONS TAKEN FOR SYMPTOMS ASIDE FROM TYLENOL.  NO HISTORY OF ALLERGIC RHINITIS.  IS NOT WORSENING.  SAW OPHTHALMOLOGY LAST MONTH.  ADVISED DETACHED RETINA AND HAD A LASER PROCEDURE.  POSSIBLE GLAUCOMA IN LEFT EYE.  NO OTHER COMPLAINTS TODAY.

## 2018-09-29 NOTE — PHYSICAL EXAM
[No Acute Distress] : no acute distress [Well Nourished] : well nourished [Well-Appearing] : well-appearing [Normal Outer Ear/Nose] : the outer ears and nose were normal in appearance [Normal Oropharynx] : the oropharynx was normal [Normal TMs] : both tympanic membranes were normal [Supple] : supple [No Lymphadenopathy] : no lymphadenopathy [No Respiratory Distress] : no respiratory distress  [Clear to Auscultation] : lungs were clear to auscultation bilaterally [No Accessory Muscle Use] : no accessory muscle use [Normal Rate] : normal rate  [Regular Rhythm] : with a regular rhythm [Normal S1, S2] : normal S1 and S2

## 2018-09-29 NOTE — PLAN
[FreeTextEntry1] : SUPPORTIVE CARE: REST, FLUIDS, STEAM\par SALINE NASAL SPRAY\par WOULD LIKE TO AVOID ADDITIONAL MEDICATION\par FOLLOW-UP ALL SPECIALISTS AS DIRECTED \par CALL WITH ANY QUESTIONS, CONCERNS OR CHANGES

## 2018-10-10 ENCOUNTER — APPOINTMENT (OUTPATIENT)
Dept: UROLOGY | Facility: CLINIC | Age: 73
End: 2018-10-10

## 2018-11-14 ENCOUNTER — RECORD ABSTRACTING (OUTPATIENT)
Age: 73
End: 2018-11-14

## 2018-11-28 ENCOUNTER — FORM ENCOUNTER (OUTPATIENT)
Age: 73
End: 2018-11-28

## 2018-11-28 ENCOUNTER — MEDICATION RENEWAL (OUTPATIENT)
Age: 73
End: 2018-11-28

## 2018-11-29 ENCOUNTER — OUTPATIENT (OUTPATIENT)
Dept: OUTPATIENT SERVICES | Facility: HOSPITAL | Age: 73
LOS: 1 days | End: 2018-11-29
Payer: MEDICARE

## 2018-11-29 ENCOUNTER — APPOINTMENT (OUTPATIENT)
Dept: ULTRASOUND IMAGING | Facility: CLINIC | Age: 73
End: 2018-11-29
Payer: MEDICARE

## 2018-11-29 DIAGNOSIS — D30.01 BENIGN NEOPLASM OF RIGHT KIDNEY: ICD-10-CM

## 2018-11-29 DIAGNOSIS — Z98.2 PRESENCE OF CEREBROSPINAL FLUID DRAINAGE DEVICE: Chronic | ICD-10-CM

## 2018-11-29 PROCEDURE — 76770 US EXAM ABDO BACK WALL COMP: CPT | Mod: 26

## 2018-11-29 PROCEDURE — 76770 US EXAM ABDO BACK WALL COMP: CPT

## 2018-12-05 ENCOUNTER — LABORATORY RESULT (OUTPATIENT)
Age: 73
End: 2018-12-05

## 2018-12-05 ENCOUNTER — APPOINTMENT (OUTPATIENT)
Dept: FAMILY MEDICINE | Facility: CLINIC | Age: 73
End: 2018-12-05
Payer: MEDICARE

## 2018-12-05 VITALS
WEIGHT: 156 LBS | SYSTOLIC BLOOD PRESSURE: 122 MMHG | HEART RATE: 76 BPM | BODY MASS INDEX: 30.63 KG/M2 | HEIGHT: 60 IN | DIASTOLIC BLOOD PRESSURE: 70 MMHG

## 2018-12-05 DIAGNOSIS — J06.9 ACUTE UPPER RESPIRATORY INFECTION, UNSPECIFIED: ICD-10-CM

## 2018-12-05 PROCEDURE — G0439: CPT

## 2018-12-05 PROCEDURE — 36415 COLL VENOUS BLD VENIPUNCTURE: CPT | Mod: 59

## 2018-12-05 PROCEDURE — G0008: CPT

## 2018-12-05 PROCEDURE — 90662 IIV NO PRSV INCREASED AG IM: CPT

## 2018-12-05 RX ORDER — LOSARTAN POTASSIUM AND HYDROCHLOROTHIAZIDE 12.5; 1 MG/1; MG/1
TABLET ORAL
Refills: 0 | Status: DISCONTINUED | COMMUNITY
End: 2018-12-05

## 2018-12-05 RX ORDER — PSYLLIUM HUSK 0.4 G
CAPSULE ORAL
Refills: 0 | Status: DISCONTINUED | COMMUNITY
End: 2018-12-05

## 2018-12-06 PROBLEM — J06.9 ACUTE UPPER RESPIRATORY INFECTION: Status: RESOLVED | Noted: 2018-09-29 | Resolved: 2018-12-06

## 2018-12-06 NOTE — PLAN
[FreeTextEntry1] : TO HAVE MAMMOGRAM TOMORROW\par NEED MOST RECENT RHEUMATOLOGY CONSULTANT NOTES DR. MONET\par COLONOSCOPY\par FLU VACCINE GIVEN AND TOLERATED WELL \par HEALTHY DIET AND LIFESTYLE MODIFICATIONS\par FALL PRECAUTIONS\par MONITOR LAB WORK\par CARE MANAGEMENT TO DISCUSS TRANSPORTATION CONCERNS - INCLUDING POSSIBLE SCAT TRANSPORTATION\par FOLLOW-UP ALL SPECIALISTS AS DIRECTED \par BLOOD PRESSURE CONTROLLED\par CONTINUE CURRENT MEDICATIONS\par CALL WITH ANY QUESTIONS, CONCERNS OR CHANGES

## 2018-12-06 NOTE — PHYSICAL EXAM
[No Acute Distress] : no acute distress [Well Nourished] : well nourished [Well-Appearing] : well-appearing [Normal Sclera/Conjunctiva] : normal sclera/conjunctiva [PERRL] : pupils equal round and reactive to light [Normal Outer Ear/Nose] : the outer ears and nose were normal in appearance [Normal Oropharynx] : the oropharynx was normal [Normal TMs] : both tympanic membranes were normal [Supple] : supple [No Lymphadenopathy] : no lymphadenopathy [No Respiratory Distress] : no respiratory distress  [Clear to Auscultation] : lungs were clear to auscultation bilaterally [No Accessory Muscle Use] : no accessory muscle use [Normal Rate] : normal rate  [Regular Rhythm] : with a regular rhythm [Normal S1, S2] : normal S1 and S2 [Soft] : abdomen soft [Non Tender] : non-tender [Normal Bowel Sounds] : normal bowel sounds [No Joint Swelling] : no joint swelling [Grossly Normal Strength/Tone] : grossly normal strength/tone [No Rash] : no rash [Normal Gait] : normal gait [Coordination Grossly Intact] : coordination grossly intact [No Focal Deficits] : no focal deficits [Speech Grossly Normal] : speech grossly normal [Normal Mood] : the mood was normal [Normal Insight/Judgement] : insight and judgment were intact [Acne] : no acne [de-identified] : CANE FOR SUPPORT

## 2018-12-06 NOTE — REVIEW OF SYSTEMS
[Joint Pain] : joint pain [Back Pain] : back pain [Fever] : no fever [Chills] : no chills [Fatigue] : no fatigue [Discharge] : no discharge [Pain] : no pain [Earache] : no earache [Nasal Discharge] : no nasal discharge [Sore Throat] : no sore throat [Chest Pain] : no chest pain [Palpitations] : no palpitations [Lower Ext Edema] : no lower extremity edema [Shortness Of Breath] : no shortness of breath [Wheezing] : no wheezing [Cough] : no cough [Abdominal Pain] : no abdominal pain [Diarrhea] : diarrhea [Vomiting] : no vomiting [Melena] : no melena [Dysuria] : no dysuria [Hematuria] : no hematuria [Itching] : no itching [Skin Rash] : no skin rash [Headache] : no headache [Dizziness] : no dizziness [Fainting] : no fainting [Suicidal] : not suicidal [Depression] : no depression [Easy Bleeding] : no easy bleeding [Easy Bruising] : no easy bruising

## 2018-12-06 NOTE — HEALTH RISK ASSESSMENT
[Good] : ~his/her~  mood as  good [No falls in past year] : Patient reported no falls in the past year [0] : 2) Feeling down, depressed, or hopeless: Not at all (0) [HIV test declined] : HIV test declined [Hepatitis C test declined] : Hepatitis C test declined [None] : None [Transportation] : transportation [With Family] : lives with family [# of Members in Household ___] :  household currently consist of [unfilled] member(s) [High School] : high school [# Of Children ___] : has [unfilled] children [Feels Safe at Home] : Feels safe at home [Fully functional (bathing, dressing, toileting, transferring, walking, feeding)] : Fully functional (bathing, dressing, toileting, transferring, walking, feeding) [Fully functional (using the telephone, shopping, preparing meals, housekeeping, doing laundry, using] : Fully functional and needs no help or supervision to perform IADLs (using the telephone, shopping, preparing meals, housekeeping, doing laundry, using transportation, managing medications and managing finances) [Reports changes in vision] : Reports changes in vision [Smoke Detector] : smoke detector [Carbon Monoxide Detector] : carbon monoxide detector [Safety elements used in home] : safety elements used in home [Seat Belt] :  uses seat belt [Discussed at today's visit] : Advance Directives Discussed at today's visit [Designated Healthcare Proxy] : Designated healthcare proxy [Name: ___] : Health Care Proxy's Name: [unfilled]  [Relationship: ___] : Relationship: [unfilled] [] : No [[id number]] : 0 [Change in mental status noted] : No change in mental status noted [Language] : denies difficulty with language [Learning/Retaining New Information] : denies difficulty learning/retaining new information [Handling Complex Tasks] : denies difficulty handling complex tasks [High Risk Behavior] : no high risk behavior [Reports changes in hearing] : Reports no changes in hearing [Reports changes in dental health] : Reports no changes in dental health [Sunscreen] : does not use sunscreen [MammogramDate] : 11/17 [BoneDensityDate] : 11/17 [BoneDensityComments] : DR. MONET - OSTEOPENIA - PROLIA [ColonoscopyDate] : 05/08 [de-identified] : PLANS TO GET SCAT [de-identified] : HAS OWN APARTMENT [de-identified] : SEE HPI

## 2018-12-06 NOTE — COUNSELING
[Weight management counseling provided] : Weight management [Healthy eating counseling provided] : healthy eating [Activity counseling provided] : activity [Low Fat Diet] : Low fat diet [Low Salt Diet] : Low salt diet [None] : None [Good understanding] : Patient has a good understanding of lifestyle changes and the steps needed to achieve self management goals

## 2018-12-06 NOTE — HISTORY OF PRESENT ILLNESS
[FreeTextEntry1] : wellness exam [de-identified] : PRESENTING FOR YEARLY WELLNESS EXAM.  NOTES THAT SINCE LAST VISIT SHE HAD A DETACHED RETINA RIGHT EYE.  HAD SURGICAL INTERVENTION - VISION IMPROVED BUT STILL SEES A "BUBBLE".  TO FOLLOW-UP WITH OPHTHALMOLOGY ON THE 14TH DR. CRUZ.  SAW ORTHOPEDICS DR. ALEJANDRE - WAS ADVISED PHYSICAL THERAPY - ON HOLD DUE TO RECENT RETINAL DETACHMENT.  DIET WITH SOME ROOM FOR IMPROVEMENT.  NOT ROUTINELY EXERCISING. \par UROLOGY: DR. DICKERSON\par NEUROLOGY: DR. KELLY\par GYN: DR. SPRINGER\par CARDIOLOGY: DR. TENORIO

## 2018-12-12 ENCOUNTER — MEDICATION RENEWAL (OUTPATIENT)
Age: 73
End: 2018-12-12

## 2018-12-14 ENCOUNTER — APPOINTMENT (OUTPATIENT)
Dept: UROLOGY | Facility: CLINIC | Age: 73
End: 2018-12-14

## 2018-12-31 LAB
25(OH)D3 SERPL-MCNC: 27.5 NG/ML
ALBUMIN SERPL ELPH-MCNC: 4.5 G/DL
ALP BLD-CCNC: 67 U/L
ALT SERPL-CCNC: 15 U/L
ANION GAP SERPL CALC-SCNC: 14 MMOL/L
APPEARANCE: ABNORMAL
AST SERPL-CCNC: 10 U/L
BASOPHILS # BLD AUTO: 0.03 K/UL
BASOPHILS NFR BLD AUTO: 0.4 %
BILIRUB SERPL-MCNC: 0.3 MG/DL
BILIRUBIN URINE: NEGATIVE
BLOOD URINE: NEGATIVE
BUN SERPL-MCNC: 26 MG/DL
CALCIUM SERPL-MCNC: 11 MG/DL
CHLORIDE SERPL-SCNC: 101 MMOL/L
CHOLEST SERPL-MCNC: 247 MG/DL
CHOLEST/HDLC SERPL: 3.3 RATIO
CO2 SERPL-SCNC: 27 MMOL/L
COLOR: YELLOW
CREAT SERPL-MCNC: 0.87 MG/DL
EOSINOPHIL # BLD AUTO: 0.12 K/UL
EOSINOPHIL NFR BLD AUTO: 1.6 %
GLUCOSE QUALITATIVE U: NEGATIVE MG/DL
GLUCOSE SERPL-MCNC: 94 MG/DL
HCT VFR BLD CALC: 38.9 %
HCV AB SER QL: NONREACTIVE
HCV S/CO RATIO: 0.16 S/CO
HDLC SERPL-MCNC: 74 MG/DL
HEMOCCULT STL QL IA: NEGATIVE
HGB BLD-MCNC: 12.7 G/DL
HIV1+2 AB SPEC QL IA.RAPID: NONREACTIVE
IMM GRANULOCYTES NFR BLD AUTO: 0.3 %
KETONES URINE: NEGATIVE
LDLC SERPL CALC-MCNC: 138 MG/DL
LEUKOCYTE ESTERASE URINE: NEGATIVE
LYMPHOCYTES # BLD AUTO: 1.14 K/UL
LYMPHOCYTES NFR BLD AUTO: 14.9 %
MAN DIFF?: NORMAL
MCHC RBC-ENTMCNC: 29.3 PG
MCHC RBC-ENTMCNC: 32.6 GM/DL
MCV RBC AUTO: 89.6 FL
MONOCYTES # BLD AUTO: 0.54 K/UL
MONOCYTES NFR BLD AUTO: 7.1 %
NEUTROPHILS # BLD AUTO: 5.79 K/UL
NEUTROPHILS NFR BLD AUTO: 75.7 %
NITRITE URINE: POSITIVE
PH URINE: 6.5
PLATELET # BLD AUTO: 292 K/UL
POTASSIUM SERPL-SCNC: 4 MMOL/L
PROT SERPL-MCNC: 6.9 G/DL
PROTEIN URINE: NEGATIVE MG/DL
RBC # BLD: 4.34 M/UL
RBC # FLD: 13.1 %
SODIUM SERPL-SCNC: 142 MMOL/L
SPECIFIC GRAVITY URINE: 1.02
T4 FREE SERPL-MCNC: 1.4 NG/DL
TRIGL SERPL-MCNC: 173 MG/DL
TSH SERPL-ACNC: 0.84 UIU/ML
UROBILINOGEN URINE: NEGATIVE MG/DL
WBC # FLD AUTO: 7.64 K/UL

## 2019-01-02 ENCOUNTER — APPOINTMENT (OUTPATIENT)
Dept: FAMILY MEDICINE | Facility: CLINIC | Age: 74
End: 2019-01-02
Payer: MEDICARE

## 2019-01-02 VITALS
HEART RATE: 75 BPM | SYSTOLIC BLOOD PRESSURE: 118 MMHG | BODY MASS INDEX: 30.63 KG/M2 | TEMPERATURE: 98.2 F | DIASTOLIC BLOOD PRESSURE: 72 MMHG | OXYGEN SATURATION: 98 % | WEIGHT: 156 LBS | HEIGHT: 60 IN

## 2019-01-02 DIAGNOSIS — R09.82 POSTNASAL DRIP: ICD-10-CM

## 2019-01-02 PROCEDURE — 99214 OFFICE O/P EST MOD 30 MIN: CPT

## 2019-01-06 NOTE — HISTORY OF PRESENT ILLNESS
[Moderate] : moderate [___ Days ago] : [unfilled] days ago [Paroxysmal] : paroxysmal [Congestion] : congestion [Cough] : cough [Fatigue] : fatigue [Rest] : rest [Activity] : with activity [Worsening] : worsening [Sore Throat] : no sore throat [Wheezing] : no wheezing [Chills] : no chills [Anorexia] : no anorexia [Shortness Of Breath] : no shortness of breath [Earache] : no earache [Headache] : no headache [Fever] : no fever [de-identified] : chest hurts when cough,took robitussine and it did not help

## 2019-01-06 NOTE — PLAN
[FreeTextEntry1] : zpack/ nasacort/prednisone/ ventolin\par .PND-Supportive and conservative therapies reviewed and advised: to consider:\par Increase fluids  and rest.\par Consider saline nasal spray/ irrigation  3-4 times daily\par Salt water gargles 3-4 times daily\par Cool mist humidifier. \par HAND WASHING/PURELL\par Acetaminophen/Advil for fever,  headache, myalgias\par \par follow up if no improvement

## 2019-01-30 ENCOUNTER — APPOINTMENT (OUTPATIENT)
Dept: FAMILY MEDICINE | Facility: CLINIC | Age: 74
End: 2019-01-30
Payer: MEDICARE

## 2019-01-30 VITALS
BODY MASS INDEX: 30.43 KG/M2 | DIASTOLIC BLOOD PRESSURE: 74 MMHG | SYSTOLIC BLOOD PRESSURE: 140 MMHG | HEIGHT: 60 IN | WEIGHT: 155 LBS | HEART RATE: 80 BPM

## 2019-01-30 DIAGNOSIS — Z87.09 PERSONAL HISTORY OF OTHER DISEASES OF THE RESPIRATORY SYSTEM: ICD-10-CM

## 2019-01-30 PROCEDURE — 99214 OFFICE O/P EST MOD 30 MIN: CPT

## 2019-01-30 RX ORDER — AZITHROMYCIN 250 MG/1
250 TABLET, FILM COATED ORAL
Qty: 6 | Refills: 0 | Status: DISCONTINUED | COMMUNITY
Start: 2019-01-02 | End: 2019-01-30

## 2019-01-30 RX ORDER — PREDNISONE 20 MG/1
20 TABLET ORAL DAILY
Qty: 10 | Refills: 0 | Status: DISCONTINUED | COMMUNITY
Start: 2019-01-02 | End: 2019-01-30

## 2019-01-30 NOTE — HISTORY OF PRESENT ILLNESS
[FreeTextEntry1] : F/U LAB WORK [de-identified] : PRESENTING FOR FOLLOW-UP OF LAB WORK.  VISION HAS IMPROVED.  ROUTINELY FOLLOWING UP WITH OPHTHALMOLOGY.  NOTES THAT SHE GETS REFLUX "MOST DAYS".  TAKING TUMS TWICE A DAY FOR THE PAST FEW MONTHS.  NO N/V/D.  NO BLOOD IN STOOL.  NO ABDOMINAL PAIN.  PRIOR ENDOSCOPY IN 2012.  TAKES ALEVE TWICE A WEEK.  LAST ENDOSCOPY A FEW YEARS AGO.  CHRONIC HISTORY OF HYPERTENSION AND HYPERLIPIDEMIA.  WILL NOT TAKE CHOLESTEROL MEDICATION.  BLOOD PRESSURE CONTROLLED.   HAS HAD NO HEADACHE, DIZZINESS, CHEST PAIN, SHORTNESS OF BREATH, GI OR URINARY COMPLAINTS.  NO OTHER COMPLAINTS TODAY. \par \par will not take cholesterol medication

## 2019-01-30 NOTE — PLAN
[FreeTextEntry1] : CORRECTED CALCIUM 10.6 - TAKING TUMS TWICE A DAY.  TO HOLD\par GERD LIFESTYLE MODIFICATIONS REVIEWED; ELEVATE HOB, AVOID EATING 3 - 4 HOURS PRIOR TO BEDTIME, MONITOR FOR FOOD TRIGGERS AND AVOID \par AVOID NSAIDS\par FOLLOW-UP WITH GI DR. STANFORD\par INCREASE VITAMIN D TO 2000 DAILY\par MONITOR BLOOD PRESSURE\par HEALTHY DIET AND LIFESTYLE MODIFICATIONS\par FOLLOW-UP ALL SPECIALISTS AS DIRECTED \par CALL WITH ANY QUESTIONS, CONCERNS OR CHANGES

## 2019-02-27 ENCOUNTER — APPOINTMENT (OUTPATIENT)
Dept: ORTHOPEDIC SURGERY | Facility: CLINIC | Age: 74
End: 2019-02-27
Payer: MEDICARE

## 2019-02-27 VITALS
WEIGHT: 153 LBS | HEART RATE: 59 BPM | SYSTOLIC BLOOD PRESSURE: 120 MMHG | HEIGHT: 63 IN | BODY MASS INDEX: 27.11 KG/M2 | DIASTOLIC BLOOD PRESSURE: 68 MMHG

## 2019-02-27 PROCEDURE — 20550 NJX 1 TENDON SHEATH/LIGAMENT: CPT | Mod: 59,LT

## 2019-02-27 PROCEDURE — 99214 OFFICE O/P EST MOD 30 MIN: CPT | Mod: 25

## 2019-03-07 ENCOUNTER — APPOINTMENT (OUTPATIENT)
Dept: FAMILY MEDICINE | Facility: CLINIC | Age: 74
End: 2019-03-07
Payer: MEDICARE

## 2019-03-07 ENCOUNTER — NON-APPOINTMENT (OUTPATIENT)
Age: 74
End: 2019-03-07

## 2019-03-07 VITALS
HEART RATE: 77 BPM | OXYGEN SATURATION: 99 % | BODY MASS INDEX: 27.29 KG/M2 | TEMPERATURE: 97.4 F | HEIGHT: 63 IN | WEIGHT: 154 LBS | DIASTOLIC BLOOD PRESSURE: 66 MMHG | SYSTOLIC BLOOD PRESSURE: 106 MMHG

## 2019-03-07 PROCEDURE — 99215 OFFICE O/P EST HI 40 MIN: CPT | Mod: 25

## 2019-03-07 PROCEDURE — 93000 ELECTROCARDIOGRAM COMPLETE: CPT

## 2019-03-08 ENCOUNTER — MEDICATION RENEWAL (OUTPATIENT)
Age: 74
End: 2019-03-08

## 2019-03-08 NOTE — PLAN
[FreeTextEntry1] : OPTIMIZED MEDICALLY FOR PROPOSED LOW RISK PROCEDURE\par PRE-OPERATIVE TESTING REVIEWED\par EKG: NSR AT 63 BPM, NO ACUTE ST-T WAVE CHANGES\par SAW CARDIOLOGY NOVEMBER 2018\par GI/DVT PROPHYLAXIS PER SURGERY\par BLOOD PRESSURE CONTROLLED\par FEELING WELL ON CURRENT DOSAGE OF ZOLOFT - TO CONTINUE\par GOOD SUPPORT SYSTEM\par CALL WITH ANY QUESTIONS, CONCERNS OR CHANGES PRIOR TO PROCEDURE\par SUPPORT PROVIDED\par FOLLOW-UP POST-OPERATIVELY

## 2019-03-08 NOTE — HISTORY OF PRESENT ILLNESS
[No Pertinent Cardiac History] : no history of aortic stenosis, atrial fibrillation, coronary artery disease, recent myocardial infarction, or implantable device/pacemaker [No Pertinent Pulmonary History] : no history of asthma, COPD, sleep apnea, or smoking [No Adverse Anesthesia Reaction] : no adverse anesthesia reaction in self or family member [(Patient denies any chest pain, claudication, dyspnea on exertion, orthopnea, palpitations or syncope)] : Patient denies any chest pain, claudication, dyspnea on exertion, orthopnea, palpitations or syncope [Aortic Stenosis] : no aortic stenosis [Atrial Fibrillation] : no atrial fibrillation [Coronary Artery Disease] : no coronary artery disease [Recent Myocardial Infarction] : no recent myocardial infarction [Implantable Device/Pacemaker] : no implantable device/pacemaker [Asthma] : no asthma [COPD] : no COPD [Sleep Apnea] : no sleep apnea [Smoker] : not a smoker [Family Member] : no family member with adverse anesthesia reaction/sudden death [Self] : no previous adverse anesthesia reaction [Chronic Anticoagulation] : no chronic anticoagulation [Chronic Kidney Disease] : no chronic kidney disease [Diabetes] : no diabetes [FreeTextEntry1] : RIGHT CATARACT SURGERY [FreeTextEntry2] : 3/13/19 [FreeTextEntry3] : DR. DELGADO [FreeTextEntry4] : PATIENT PRESENTING FOR CLEARANCE FOR UPCOMING CATARACT SURGERY.  FEELING WELL TODAY.  HAS A CHRONIC HISTORY OF HYPERTENSION AND HYPERLIPIDEMIA. WILL NOT TAKE CHOLESTEROL MEDICATION. BLOOD PRESSURE CONTROLLED. NO KNOWN PERSONAL OR FAMILY HISTORY OF BLOOD OR CLOTTING DISORDERS. DOING WELL. BLOOD PRESSURE CONTROLLED ON HYZAAR AND TRIAMTERENE HCTZ.  TRYING TO IMPROVE LIFESTYLE MODIFICATIONS.  TAKES ZOLOFT FOR ANXIETY.  FEELING WELL ON CURRENT DOSAGE.  GOOD SUPPORT SYSTEM AND SOCIALIZING WITH FRIENDS FREQUENTLY.  HAS HAD NO FEVER, CHEST PAIN, SHORTNESS OF BREATH, COUGH, HEADACHES, DIZZINESS, GI OR URINARY COMPLAINTS. NO OTHER COMPLAINTS. CAN WALK A FLIGHT OF STAIRS.  [FreeTextEntry5] : HAD VENTRICULAR SHUNT [FreeTextEntry7] : ECHOCARDIOGRAM 2016\par STRESS TEST 08/13\par CAROTID DOPPLER 10/14\par

## 2019-03-08 NOTE — ADDENDUM
[FreeTextEntry1] : I, Norah Gómez, acted solely as a scribe for Dr. Tish Shipley on this date 3/7/19.\par

## 2019-03-08 NOTE — PHYSICAL EXAM
[No Acute Distress] : no acute distress [Well Nourished] : well nourished [Well-Appearing] : well-appearing [Normal Sclera/Conjunctiva] : normal sclera/conjunctiva [PERRL] : pupils equal round and reactive to light [Normal Outer Ear/Nose] : the outer ears and nose were normal in appearance [Normal Oropharynx] : the oropharynx was normal [Supple] : supple [No Lymphadenopathy] : no lymphadenopathy [No Respiratory Distress] : no respiratory distress  [Clear to Auscultation] : lungs were clear to auscultation bilaterally [No Accessory Muscle Use] : no accessory muscle use [Normal Rate] : normal rate  [Regular Rhythm] : with a regular rhythm [Normal S1, S2] : normal S1 and S2 [No Murmur] : no murmur heard [Pedal Pulses Present] : the pedal pulses are present [No Edema] : there was no peripheral edema [Soft] : abdomen soft [Non Tender] : non-tender [Normal Bowel Sounds] : normal bowel sounds [Normal Gait] : normal gait [Coordination Grossly Intact] : coordination grossly intact [No Focal Deficits] : no focal deficits [Deep Tendon Reflexes (DTR)] : deep tendon reflexes were 2+ and symmetric [Speech Grossly Normal] : speech grossly normal [Normal Affect] : the affect was normal [Normal Mood] : the mood was normal [EOMI] : extraocular movements intact [Normal TMs] : both tympanic membranes were normal [No Joint Swelling] : no joint swelling [Grossly Normal Strength/Tone] : grossly normal strength/tone [No Rash] : no rash [Normal Insight/Judgement] : insight and judgment were intact

## 2019-03-08 NOTE — COUNSELING
[Behavioral health counseling provided] : behavioral health  [Engage in a relaxing activity] : Engage in a relaxing activity

## 2019-03-18 NOTE — ED ADULT NURSE NOTE - OTHER CARE PROVIDERS
Pre-Injection Information: Vital signs entered., Allergies reviewed as required for injection type., Pt states feeling well, no complaints., Pt denies signs and symptoms of infection., Pt states not feeling well, see comments. and Authorization completed if applicable.    Refer to MAR (medication administration record) for type of injection and medication given.  Needle Size: 25 g. 5/8\" and per kit  Patient tolerated well: Stable     Dr Meza is supervising clinician today.    
Chel - Tangela SALAZAR

## 2019-04-05 ENCOUNTER — APPOINTMENT (OUTPATIENT)
Dept: FAMILY MEDICINE | Facility: CLINIC | Age: 74
End: 2019-04-05
Payer: MEDICARE

## 2019-04-05 VITALS
HEART RATE: 74 BPM | DIASTOLIC BLOOD PRESSURE: 78 MMHG | WEIGHT: 155 LBS | BODY MASS INDEX: 27.46 KG/M2 | SYSTOLIC BLOOD PRESSURE: 102 MMHG | HEIGHT: 63 IN

## 2019-04-05 DIAGNOSIS — Z92.29 PERSONAL HISTORY OF OTHER DRUG THERAPY: ICD-10-CM

## 2019-04-05 PROCEDURE — 99214 OFFICE O/P EST MOD 30 MIN: CPT

## 2019-04-06 PROBLEM — Z92.29 HISTORY OF INFLUENZA VACCINATION: Status: RESOLVED | Noted: 2018-12-05 | Resolved: 2019-04-06

## 2019-04-06 NOTE — HISTORY OF PRESENT ILLNESS
[FreeTextEntry1] : F/U  HTN, HYPERLIPIDEMIA, DEPRESSION  [de-identified] : PATIENT PRESENTING FOR FOLLOW UP. HAS A CHRONIC HISTORY OF HYPERTENSION AND HYPERLIPIDEMIA. WILL NOT TAKE CHOLESTEROL MEDICATION. BLOOD PRESSURE CONTROLLED. S/P RIGHT CATARACT SURGERY. DOING WELL POST OP. RECENTLY NOTING INTERMITTENT RIGHT HAND TREMOR. SCHEDULED TO FOLLOW UP WITH DR. KELLY OF NEUROLOGY IN TWO WEEKS. HISTORY OF VITAMIN D INSUFFICIENCY. NOT CURRENTLY ON SUPPLEMENTAL VITAMIN D. DOING WELL ON CURRENT DOSE OF ZOLOFT. GETTING ALONG WELL WITH OTHERS. SLEEPING WELL.APPETITE IS GOOD. NO SUICIDAL/HOMICIDAL IDEATIONS OR PLANS.  HAS HAD NO CHEST PAIN, SHORTNESS OF BREATH, HEADACHE, DIZZINESS, GI OR URINARY COMPLAINTS. NO OTHER COMPLAINTS TODAY.

## 2019-04-06 NOTE — ADDENDUM
[FreeTextEntry1] : I, Norah Gómez, acted solely as a scribe for Dr. Tish Shipley on this date 4/5/19.

## 2019-04-06 NOTE — PLAN
[FreeTextEntry1] : BLOOD PRESSURE REPEATED.  WRONG CUFF SIZE ORIGINALLY USED.  ON REPEAT 126/84\par WILL CONTINUE TRIAMTERENE/HCTZ\par MONITORS LABS FOR VITAMIN D AND CHOLESTEROL\par HEALTHY DIET AND LIFESTYLE MODIFICATIONS\par HEALTHY WEIGHT LOSS \par FOLLOW UP WITH ALL SPECIALISTS AS DIRECTED INCLUDING NEUROLOGY IN 2 WEEKS TO DISCUSS INTERMITTENT TREMOR\par FEELING WELL ON CURRENT DOSAGE OF ZOLOFT\par CALL WITH ANY QUESTIONS, CONCERNS OR CHANGES

## 2019-04-08 ENCOUNTER — RESULT REVIEW (OUTPATIENT)
Age: 74
End: 2019-04-08

## 2019-04-08 LAB
25(OH)D3 SERPL-MCNC: 36.3 NG/ML
ALBUMIN SERPL ELPH-MCNC: 4.3 G/DL
ALP BLD-CCNC: 56 U/L
ALT SERPL-CCNC: 13 U/L
ANION GAP SERPL CALC-SCNC: 11 MMOL/L
AST SERPL-CCNC: 15 U/L
BILIRUB SERPL-MCNC: 0.4 MG/DL
BUN SERPL-MCNC: 27 MG/DL
CALCIUM SERPL-MCNC: 10.7 MG/DL
CHLORIDE SERPL-SCNC: 102 MMOL/L
CHOLEST SERPL-MCNC: 236 MG/DL
CHOLEST/HDLC SERPL: 3.4 RATIO
CO2 SERPL-SCNC: 29 MMOL/L
CREAT SERPL-MCNC: 0.79 MG/DL
GLUCOSE SERPL-MCNC: 88 MG/DL
HDLC SERPL-MCNC: 70 MG/DL
LDLC SERPL CALC-MCNC: 140 MG/DL
POTASSIUM SERPL-SCNC: 4.1 MMOL/L
PROT SERPL-MCNC: 6.7 G/DL
SODIUM SERPL-SCNC: 142 MMOL/L
TRIGL SERPL-MCNC: 128 MG/DL

## 2019-04-22 ENCOUNTER — APPOINTMENT (OUTPATIENT)
Dept: NEUROLOGY | Facility: CLINIC | Age: 74
End: 2019-04-22
Payer: MEDICARE

## 2019-04-22 ENCOUNTER — NON-APPOINTMENT (OUTPATIENT)
Age: 74
End: 2019-04-22

## 2019-04-22 ENCOUNTER — APPOINTMENT (OUTPATIENT)
Dept: CARDIOLOGY | Facility: CLINIC | Age: 74
End: 2019-04-22
Payer: MEDICARE

## 2019-04-22 VITALS
WEIGHT: 149 LBS | HEIGHT: 60 IN | DIASTOLIC BLOOD PRESSURE: 100 MMHG | SYSTOLIC BLOOD PRESSURE: 140 MMHG | BODY MASS INDEX: 29.25 KG/M2

## 2019-04-22 VITALS — SYSTOLIC BLOOD PRESSURE: 130 MMHG | DIASTOLIC BLOOD PRESSURE: 80 MMHG

## 2019-04-22 VITALS
HEIGHT: 59 IN | OXYGEN SATURATION: 98 % | WEIGHT: 155 LBS | HEART RATE: 76 BPM | BODY MASS INDEX: 31.25 KG/M2 | DIASTOLIC BLOOD PRESSURE: 84 MMHG | SYSTOLIC BLOOD PRESSURE: 156 MMHG

## 2019-04-22 VITALS — DIASTOLIC BLOOD PRESSURE: 76 MMHG | SYSTOLIC BLOOD PRESSURE: 160 MMHG

## 2019-04-22 DIAGNOSIS — I65.23 OCCLUSION AND STENOSIS OF BILATERAL CAROTID ARTERIES: ICD-10-CM

## 2019-04-22 PROCEDURE — 99213 OFFICE O/P EST LOW 20 MIN: CPT

## 2019-04-22 PROCEDURE — 93000 ELECTROCARDIOGRAM COMPLETE: CPT

## 2019-04-22 PROCEDURE — 99204 OFFICE O/P NEW MOD 45 MIN: CPT | Mod: 25

## 2019-04-22 RX ORDER — LOSARTAN POTASSIUM AND HYDROCHLOROTHIAZIDE 100; 25 MG/1; MG/1
100-25 TABLET, FILM COATED ORAL
Refills: 0 | Status: DISCONTINUED | COMMUNITY
End: 2019-04-22

## 2019-04-22 RX ORDER — METHYLPREDNISOLONE ACETATE 40 MG/ML
40 INJECTION, SUSPENSION INTRA-ARTICULAR; INTRALESIONAL; INTRAMUSCULAR; SOFT TISSUE
Qty: 1 | Refills: 0 | Status: DISCONTINUED | COMMUNITY
Start: 2019-02-27 | End: 2019-04-22

## 2019-04-22 NOTE — REVIEW OF SYSTEMS
[Headache] : no headache [Feeling Fatigued] : not feeling fatigued [Recent Weight Gain (___ Lbs)] : no recent weight gain [Recent Weight Loss (___ Lbs)] : no recent weight loss [Seeing Double (Diplopia)] : no diplopia [Earache] : no earache [Eyeglasses] : not currently wearing eyeglasses [Mouth Sores] : no mouth sores [Discharge From The Ears] : no discharge from the ears [Dyspnea on exertion] : not dyspnea during exertion [Chest Pain] : no chest pain [Lower Ext Edema] : no extremity edema [Palpitations] : no palpitations [Cough] : no cough [Wheezing] : no wheezing [Nausea] : no nausea [Abdominal Pain] : no abdominal pain [Dysuria] : no dysuria [Heartburn] : no heartburn [Change in Appetite] : no change in appetite [Joint Pain] : joint pain [Pelvic Pain] : no pelvic pain [Incontinence] : no incontinence [Joint Swelling] : no joint swelling [Muscle Cramps] : muscle cramps [Dizziness] : no dizziness [Skin: A Rash] : no rash: [Skin Lesions] : no skin lesions [Convulsions] : no convulsions [Tremor] : no tremor was seen [Memory Lapses Or Loss] : no memory lapses or loss [Excessive Thirst] : no polydipsia [Confusion] : no confusion was observed [Easy Bleeding] : no tendency for easy bleeding [Easy Bruising] : no tendency for easy bruising

## 2019-04-22 NOTE — PHYSICAL EXAM
[Person] : oriented to person [Time] : oriented to time [Place] : oriented to place [Short Term Intact] : short term memory intact [Remote Intact] : remote memory intact [Registration Intact] : recent registration memory intact [Span Intact] : the attention span was normal [Concentration Intact] : normal concentrating ability [Naming Objects] : no difficulty naming common objects [Visual Intact] : visual attention was ~T not ~L decreased [Repeating Phrases] : no difficulty repeating a phrase [Fluency] : fluency intact [Comprehension] : comprehension intact [Current Events] : adequate knowledge of current events [Past History] : adequate knowledge of personal past history [Cranial Nerves Optic (II)] : visual acuity intact bilaterally,  visual fields full to confrontation, pupils equal round and reactive to light [Cranial Nerves Oculomotor (III)] : extraocular motion intact [Cranial Nerves Trigeminal (V)] : facial sensation intact symmetrically [Cranial Nerves Facial (VII)] : face symmetrical [Cranial Nerves Vestibulocochlear (VIII)] : hearing was intact bilaterally [Cranial Nerves Glossopharyngeal (IX)] : tongue and palate midline [Cranial Nerves Accessory (XI - Cranial And Spinal)] : head turning and shoulder shrug symmetric [Cranial Nerves Hypoglossal (XII)] : there was no tongue deviation with protrusion [Motor Tone] : muscle tone was normal in all four extremities [Motor Strength] : muscle strength was normal in all four extremities [Involuntary Movements] : no involuntary movements were seen [No Muscle Atrophy] : normal bulk in all four extremities [Motor Handedness Right-Handed] : the patient is right hand dominant [Paresis Pronator Drift Right-Sided] : no pronator drift on the right [Paresis Pronator Drift Left-Sided] : no pronator drift on the left [Sensation Tactile Decrease] : light touch was intact [Sensation Pain / Temperature Decrease] : pain and temperature was intact [Sensation Vibration Decrease] : vibration was intact [Proprioception] : proprioception was intact [Balance] : balance was intact [Tremor] : no tremor present [Coordination - Dysmetria Impaired Finger-to-Nose Bilateral] : not present [2+] : Patella left 2+ [FreeTextEntry8] : Antalgic gait [Sclera] : the sclera and conjunctiva were normal [PERRL With Normal Accommodation] : pupils were equal in size, round, reactive to light, with normal accommodation [Extraocular Movements] : extraocular movements were intact [No BRIAN] : no internuclear ophthalmoplegia [No APD] : no afferent pupillary defect [Full Visual Field] : full visual field

## 2019-04-22 NOTE — HISTORY OF PRESENT ILLNESS
[FreeTextEntry1] : 72 yo female with hypertension, TIA in 204 under the care of Dr. Saenz. She has hyperlipidemia but is not on any medications. \par She has no chest pain and can climb a flight of stairs. She has no pnd orthopnea or leg edema. \par She has NPH, s/p shunt placement. \par she walks with a cane. \par

## 2019-04-22 NOTE — CONSULT LETTER
[Dear  ___] : Dear  [unfilled], [Courtesy Letter:] : I had the pleasure of seeing your patient, [unfilled], in my office today. [Please see my note below.] : Please see my note below. [Consult Closing:] : Thank you very much for allowing me to participate in the care of this patient.  If you have any questions, please do not hesitate to contact me. [Sincerely,] : Sincerely, [FreeTextEntry3] : Evans Milligan M.D., Ph.D. DPN-N\par [facility]\par Neurology at [place]\par Medical Director of Stroke Services\par [facility]\par

## 2019-04-22 NOTE — ASSESSMENT
[FreeTextEntry1] : 72 yo female with hypertension, untreated hyperlipidemia, prior CVA, TIA and shunt placement for NPH. She walks with a cane. \par I reviewed her most recent available cardiac and vascular studies.\par She has carotid stenosis b/l as per prior US of carotid.\par We spoke about elevated lipid panel and my recommendation was to start statin therapy.\par We will perform a TTE due to murmur heard on exam, most likely of tricuspid valve regurgitation.\par Stress test with risk factors and EKG suggestive of old AS MI vs delayed RS transition.\par Carotid duplex\par BP to goal \par FU in 1 month.

## 2019-04-22 NOTE — REASON FOR VISIT
[Consultation] : a consultation regarding [Family Member] : family member [FreeTextEntry1] : Risk facotrs for CAD

## 2019-04-22 NOTE — PHYSICAL EXAM
[Well Groomed] : well groomed [Normal Appearance] : normal appearance [Normal Conjunctiva] : the conjunctiva exhibited no abnormalities [General Appearance - In No Acute Distress] : no acute distress [Normal Oral Mucosa] : normal oral mucosa [Normal Oropharynx] : normal oropharynx [Normal Jugular Venous A Waves Present] : normal jugular venous A waves present [Normal Jugular Venous V Waves Present] : normal jugular venous V waves present [Heart Rate And Rhythm] : heart rate and rhythm were normal [Heart Sounds] : normal S1 and S2 [Arterial Pulses Normal] : the arterial pulses were normal [FreeTextEntry1] : 3/6 julius marina [Edema] : no peripheral edema present [Bowel Sounds] : normal bowel sounds [Auscultation Breath Sounds / Voice Sounds] : lungs were clear to auscultation bilaterally [Respiration, Rhythm And Depth] : normal respiratory rhythm and effort [Abdomen Soft] : soft [Abdomen Tenderness] : non-tender [] : no hepato-splenomegaly [Gait - Sufficient For Exercise Testing] : the gait was sufficient for exercise testing [Abnormal Walk] : normal gait [Nail Clubbing] : no clubbing of the fingernails [Cyanosis, Localized] : no localized cyanosis [No Venous Stasis] : no venous stasis [Skin Color & Pigmentation] : normal skin color and pigmentation [Skin Turgor] : normal skin turgor [Oriented To Time, Place, And Person] : oriented to person, place, and time [Impaired Insight] : insight and judgment were intact [Affect] : the affect was normal

## 2019-04-22 NOTE — HISTORY OF PRESENT ILLNESS
[FreeTextEntry1] : Followup April 6, 2018:\par This is a 72-year-old woman who returns for followup of normal pressure hydrocephalus. She also has anxiety disorder. Regarding her normal pressure hydrocephalus she is stable. She currently sees her neurosurgeon on every two-year basis. Her last visit was this past summer and everything was well. She denies any symptoms of memory loss, incontinence of urine, or gait disorder. Regarding her anxiety she states that last fall she stopped taking her medication and several weeks later she ended up in the house with an anxiety attack. Her anxiety appears to be stable as long as she is on medication i.e. Zoloft. It worsens when she stopped taking her medication. She is also had what sounds like orthostasis when taking care of Zosyn in the morning along with her other blood pressure medications. She is here today for neurologic followup and treatment\par \par Followup April 22, 2019:\par This is a 73-year-old woman presents today for followup of normal pressure except with. She follows with Dr. Deborah Kenney neurosurgery on an every other year basis. She is scheduled to see him this year. She's had issues with her eyes but no issues relating to the normal pressure hydrocephalus. She has a limp which is due to her right knee and is scheduled to see an orthopedist in a week or 2 for followup. She does not have urinary incontinence but does have urinary urgency. She does not have significant chronic memory issues and she does not have any other gait difficulty such as a magnetic gait other than can be attributed to her right knee. She is here today for neurologic followup.\par

## 2019-04-24 ENCOUNTER — APPOINTMENT (OUTPATIENT)
Dept: CARDIOLOGY | Facility: CLINIC | Age: 74
End: 2019-04-24
Payer: MEDICARE

## 2019-04-24 PROCEDURE — 93306 TTE W/DOPPLER COMPLETE: CPT

## 2019-04-24 PROCEDURE — 93880 EXTRACRANIAL BILAT STUDY: CPT

## 2019-05-02 ENCOUNTER — APPOINTMENT (OUTPATIENT)
Dept: CARDIOLOGY | Facility: CLINIC | Age: 74
End: 2019-05-02
Payer: MEDICARE

## 2019-05-02 PROCEDURE — A9500: CPT

## 2019-05-02 PROCEDURE — 93015 CV STRESS TEST SUPVJ I&R: CPT

## 2019-05-02 PROCEDURE — 78452 HT MUSCLE IMAGE SPECT MULT: CPT

## 2019-05-20 ENCOUNTER — APPOINTMENT (OUTPATIENT)
Dept: FAMILY MEDICINE | Facility: CLINIC | Age: 74
End: 2019-05-20
Payer: MEDICARE

## 2019-05-20 VITALS
SYSTOLIC BLOOD PRESSURE: 138 MMHG | WEIGHT: 155 LBS | DIASTOLIC BLOOD PRESSURE: 90 MMHG | HEART RATE: 74 BPM | HEIGHT: 59 IN | BODY MASS INDEX: 31.25 KG/M2

## 2019-05-20 DIAGNOSIS — H26.9 UNSPECIFIED CATARACT: ICD-10-CM

## 2019-05-20 PROCEDURE — 99215 OFFICE O/P EST HI 40 MIN: CPT

## 2019-05-20 RX ORDER — TRIAMCINOLONE ACETONIDE 55 UG/1
55 SPRAY, METERED NASAL
Qty: 1 | Refills: 0 | Status: DISCONTINUED | COMMUNITY
Start: 2019-01-02 | End: 2019-05-20

## 2019-05-20 RX ORDER — OMEPRAZOLE MAGNESIUM 20 MG/1
20 TABLET, DELAYED RELEASE ORAL
Qty: 30 | Refills: 0 | Status: DISCONTINUED | COMMUNITY
Start: 2019-01-30 | End: 2019-05-20

## 2019-05-20 NOTE — HISTORY OF PRESENT ILLNESS
[No Pertinent Cardiac History] : no history of aortic stenosis, atrial fibrillation, coronary artery disease, recent myocardial infarction, or implantable device/pacemaker [No Pertinent Pulmonary History] : no history of asthma, COPD, sleep apnea, or smoking [No Adverse Anesthesia Reaction] : no adverse anesthesia reaction in self or family member [(Patient denies any chest pain, claudication, dyspnea on exertion, orthopnea, palpitations or syncope)] : Patient denies any chest pain, claudication, dyspnea on exertion, orthopnea, palpitations or syncope [Aortic Stenosis] : no aortic stenosis [Atrial Fibrillation] : no atrial fibrillation [Coronary Artery Disease] : no coronary artery disease [Recent Myocardial Infarction] : no recent myocardial infarction [Asthma] : no asthma [COPD] : no COPD [Sleep Apnea] : no sleep apnea [Smoker] : not a smoker [Family Member] : no family member with adverse anesthesia reaction/sudden death [Self] : no previous adverse anesthesia reaction [Chronic Anticoagulation] : no chronic anticoagulation [Chronic Kidney Disease] : no chronic kidney disease [Diabetes] : no diabetes [FreeTextEntry1] : LEFT CATARACT PROCEDURE  [FreeTextEntry2] : 5/28/19 [FreeTextEntry3] : DR. DELGADO  [FreeTextEntry4] : MS. ROYAL IS A PLEASANT 72 YO PRESENTING FOR MEDICAL CLEARANCE FOR LEFT CATARACT PROCEDURE. HAD RIGHT CATARACT SURGERY MARCH 2019 AND TOLERATED PROCEDURE WELL.  HAS A CHRONIC HISTORY OF HYPERTENSION AND HYPERLIPIDEMIA. STARTED ON LIPITOR FOR CHOLESTEROL. NO MISSED DOSES OR SIDE EFFECTS BLOOD PRESSURE CONTROLLED. FOLLOWS WITH CARDIOLOGY ROUTINELY. HAD RECENT STRESS TEST AND ECHOCARDIOGRAM.  NO PERSONAL OR FAMILY HISTORY OF BLEEDING OR CLOTTING DISORDERS. NO HISTORY OF MI, STROKE, OR SEIZURE. HAS HAD NO FEVER, CHEST PAIN, SHORTNESS OF BREATH, HEADACHE, DIZZINESS, GI OR URINARY COMPLAINTS. NO OTHER COMPLAINTS TODAY.  [FreeTextEntry5] : HAS A VENTRICULAR SHUNT  [FreeTextEntry7] : CARDIOLOGIST: DR. DELGADO \par \par ECHOCARDIOGRAM: 4/24/19\par CAROTID DOPPLER: 4/24/19 \par STRESS TEST: 5/2/19

## 2019-05-20 NOTE — PHYSICAL EXAM
[No Acute Distress] : no acute distress [Well Nourished] : well nourished [Well-Appearing] : well-appearing [Normal Sclera/Conjunctiva] : normal sclera/conjunctiva [PERRL] : pupils equal round and reactive to light [Normal Outer Ear/Nose] : the outer ears and nose were normal in appearance [Normal Oropharynx] : the oropharynx was normal [Supple] : supple [No Lymphadenopathy] : no lymphadenopathy [No Respiratory Distress] : no respiratory distress  [Clear to Auscultation] : lungs were clear to auscultation bilaterally [No Accessory Muscle Use] : no accessory muscle use [Normal Rate] : normal rate  [Regular Rhythm] : with a regular rhythm [Normal S1, S2] : normal S1 and S2 [No Murmur] : no murmur heard [Pedal Pulses Present] : the pedal pulses are present [No Edema] : there was no peripheral edema [Soft] : abdomen soft [Non Tender] : non-tender [Normal Bowel Sounds] : normal bowel sounds [No Joint Swelling] : no joint swelling [Grossly Normal Strength/Tone] : grossly normal strength/tone [Normal Gait] : normal gait [Coordination Grossly Intact] : coordination grossly intact [No Focal Deficits] : no focal deficits [Deep Tendon Reflexes (DTR)] : deep tendon reflexes were 2+ and symmetric [Speech Grossly Normal] : speech grossly normal [Normal Affect] : the affect was normal [Normal Mood] : the mood was normal [EOMI] : extraocular movements intact [Normal TMs] : both tympanic membranes were normal [No CVA Tenderness] : no CVA  tenderness [No Rash] : no rash [Normal Insight/Judgement] : insight and judgment were intact

## 2019-05-20 NOTE — PLAN
[FreeTextEntry1] : OPTIMIZED MEDICALLY FOR PROPOSED LOW RISK PROCEDURE\par HAD RECENT CARDIAC WORK-UP\par WILL MONITOR BLOOD PRESSURE\par CALL WITH ANY QUESTIONS, CONCERNS OR CHANGES PRIOR TO PROCEDURE\par SUPPORT PROVIDED\par FOLLOW-UP POST-OPERATIVELY\par

## 2019-05-20 NOTE — ADDENDUM
[FreeTextEntry1] : I, Norah Gómez, acted solely as a scribe for Dr. Tish Shipley on this date 5/20/19.

## 2019-05-22 ENCOUNTER — APPOINTMENT (OUTPATIENT)
Dept: CARDIOLOGY | Facility: CLINIC | Age: 74
End: 2019-05-22
Payer: MEDICARE

## 2019-05-22 VITALS
HEART RATE: 65 BPM | DIASTOLIC BLOOD PRESSURE: 90 MMHG | HEIGHT: 59 IN | BODY MASS INDEX: 31.25 KG/M2 | OXYGEN SATURATION: 97 % | SYSTOLIC BLOOD PRESSURE: 170 MMHG | WEIGHT: 155 LBS

## 2019-05-22 VITALS — SYSTOLIC BLOOD PRESSURE: 162 MMHG | DIASTOLIC BLOOD PRESSURE: 80 MMHG

## 2019-05-22 PROCEDURE — 99214 OFFICE O/P EST MOD 30 MIN: CPT

## 2019-05-22 NOTE — REVIEW OF SYSTEMS
[Headache] : no headache [Recent Weight Gain (___ Lbs)] : no recent weight gain [Feeling Fatigued] : not feeling fatigued [Recent Weight Loss (___ Lbs)] : no recent weight loss [Blurry Vision] : blurred vision [Seeing Double (Diplopia)] : no diplopia [Eyeglasses] : not currently wearing eyeglasses [Earache] : no earache [Discharge From The Ears] : no discharge from the ears [Mouth Sores] : no mouth sores [Dyspnea on exertion] : dyspnea during exertion [Chest Pain] : no chest pain [Lower Ext Edema] : no extremity edema [Palpitations] : no palpitations [Cough] : no cough [Wheezing] : no wheezing [Abdominal Pain] : no abdominal pain [Nausea] : no nausea [Heartburn] : no heartburn [Change in Appetite] : no change in appetite [Dysuria] : no dysuria [Incontinence] : no incontinence [Pelvic Pain] : no pelvic pain [Joint Pain] : joint pain [Joint Swelling] : no joint swelling [Muscle Cramps] : muscle cramps [Skin: A Rash] : no rash: [Skin Lesions] : no skin lesions [Dizziness] : no dizziness [Tremor] : no tremor was seen [Convulsions] : no convulsions [Confusion] : no confusion was observed [Memory Lapses Or Loss] : no memory lapses or loss [Excessive Thirst] : no polydipsia [Easy Bleeding] : no tendency for easy bleeding [Easy Bruising] : no tendency for easy bruising

## 2019-05-22 NOTE — ASSESSMENT
[FreeTextEntry1] : 1. BP elevated. Pt will need to decrease salt intake. Added Avapro, advised her to see Dr Flores in 2-4 weeks for med adjustment\par 2. BP check daily at home. \par 3. Cont with lipitor\par 4. AI, mild. cont with BP mgmt. \par patient was advised to see us in 6 months if stable and certainly earlier with any new symptoms.\par

## 2019-05-22 NOTE — PHYSICAL EXAM
[Normal Appearance] : normal appearance [Well Groomed] : well groomed [General Appearance - In No Acute Distress] : no acute distress [Normal Oropharynx] : normal oropharynx [Normal Jugular Venous A Waves Present] : normal jugular venous A waves present [Normal Jugular Venous V Waves Present] : normal jugular venous V waves present [Respiration, Rhythm And Depth] : normal respiratory rhythm and effort [Auscultation Breath Sounds / Voice Sounds] : lungs were clear to auscultation bilaterally [Heart Rate And Rhythm] : heart rate and rhythm were normal [Heart Sounds] : normal S1 and S2 [Arterial Pulses Normal] : the arterial pulses were normal [Edema] : no peripheral edema present [FreeTextEntry1] : 3/6 julius marina [Bowel Sounds] : normal bowel sounds [Abdomen Soft] : soft [Abdomen Tenderness] : non-tender [] : no hepato-splenomegaly [Abnormal Walk] : normal gait [Gait - Sufficient For Exercise Testing] : the gait was sufficient for exercise testing [Nail Clubbing] : no clubbing of the fingernails [Cyanosis, Localized] : no localized cyanosis [Skin Color & Pigmentation] : normal skin color and pigmentation [Oriented To Time, Place, And Person] : oriented to person, place, and time [Impaired Insight] : insight and judgment were intact [Affect] : the affect was normal

## 2019-05-22 NOTE — HISTORY OF PRESENT ILLNESS
[FreeTextEntry1] : From prior note: 74 yo female with hypertension, TIA in 204 under the care of Dr. Saenz. She has hyperlipidemia but is not on any medications. \par She has no chest pain and can climb a flight of stairs. She has no pnd orthopnea or leg edema. \par She has NPH, s/p shunt placement. \par she walks with a cane. \par \par she is here today for BP, cholesterol management and to go over her recent studies.\par \par Carotid duplex with mild carotid plaque, echocardiogram normal LVEF, grade I diastolic dysfunction and mild aortic valve tegurgitaiton\par Pharmacologic nuclear study was negative for ischemia\par She has no cp but gets dyspnea on exertion. Her BP has been elevated.

## 2019-06-06 ENCOUNTER — OUTPATIENT (OUTPATIENT)
Dept: OUTPATIENT SERVICES | Facility: HOSPITAL | Age: 74
LOS: 1 days | End: 2019-06-06
Payer: MEDICARE

## 2019-06-06 ENCOUNTER — APPOINTMENT (OUTPATIENT)
Dept: CT IMAGING | Facility: CLINIC | Age: 74
End: 2019-06-06
Payer: MEDICARE

## 2019-06-06 DIAGNOSIS — Z00.00 ENCOUNTER FOR GENERAL ADULT MEDICAL EXAMINATION WITHOUT ABNORMAL FINDINGS: ICD-10-CM

## 2019-06-06 DIAGNOSIS — Z98.2 PRESENCE OF CEREBROSPINAL FLUID DRAINAGE DEVICE: Chronic | ICD-10-CM

## 2019-06-06 PROCEDURE — 70450 CT HEAD/BRAIN W/O DYE: CPT

## 2019-06-06 PROCEDURE — 70450 CT HEAD/BRAIN W/O DYE: CPT | Mod: 26

## 2019-06-16 ENCOUNTER — RX RENEWAL (OUTPATIENT)
Age: 74
End: 2019-06-16

## 2019-06-25 NOTE — ED ADULT TRIAGE NOTE - TEMPERATURE IN CELSIUS (DEGREES C)
[___ Days] : started [unfilled] days ago [Right Breast] : right [2/10] : is 2/10 in severity [Male ___] : [unfilled] male [Monogamous] : is monogamous [Sexually Active] : is sexually active [1 Year Ago] : 1 year ago [Good] : being in good health [Regular Exercise] : regular exercise [Reproductive Age] : is of reproductive age [Healthy Diet] : not having a healthy diet [Nipple Discharge] : no nipple discharge [Weight Concerns] : no concerns with her weight [Chills] : no chills [Fever] : no fever [Skin Color Change] : no skin color change 36.9

## 2019-07-03 ENCOUNTER — APPOINTMENT (OUTPATIENT)
Dept: FAMILY MEDICINE | Facility: CLINIC | Age: 74
End: 2019-07-03
Payer: MEDICARE

## 2019-07-03 VITALS
DIASTOLIC BLOOD PRESSURE: 78 MMHG | HEIGHT: 59 IN | BODY MASS INDEX: 30.84 KG/M2 | HEART RATE: 66 BPM | WEIGHT: 153 LBS | SYSTOLIC BLOOD PRESSURE: 118 MMHG

## 2019-07-03 PROCEDURE — 99213 OFFICE O/P EST LOW 20 MIN: CPT

## 2019-07-03 RX ORDER — DENOSUMAB 60 MG/ML
60 INJECTION SUBCUTANEOUS
Refills: 0 | Status: ACTIVE | COMMUNITY

## 2019-07-03 NOTE — PHYSICAL EXAM
[No Acute Distress] : no acute distress [Well Nourished] : well nourished [Well-Appearing] : well-appearing [Supple] : supple [No Lymphadenopathy] : no lymphadenopathy [No Respiratory Distress] : no respiratory distress  [No Accessory Muscle Use] : no accessory muscle use [Normal Rate] : normal rate  [Clear to Auscultation] : lungs were clear to auscultation bilaterally [Regular Rhythm] : with a regular rhythm [Normal S1, S2] : normal S1 and S2 [No Murmur] : no murmur heard [Pedal Pulses Present] : the pedal pulses are present [No Edema] : there was no peripheral edema

## 2019-07-07 NOTE — PLAN
[FreeTextEntry1] : REFERRAL TO MORE LOCAL NEUROSURGEON FOR ROUTINE FOLLOW-UP\par BLOOD PRESSURE CONTROLLED\par TO MONITOR BP\par CONTINUE ALL MEDICATIONS AS DIRECTED\par FOLLOW UP WITH ALL SPECIALISTS AS DIRECTED\par CALL WITH ANY QUESTIONS, CONCERNS OR CHANGES

## 2019-07-07 NOTE — HISTORY OF PRESENT ILLNESS
[FreeTextEntry1] : F/U BLOOD PRESSURE CHECK [de-identified] : MS. ROYAL IS PLEASANT 74 YO PRESENTING FOR FOLLOW UP. CHRONIC HISTORY OF HYPERTENSION AND HYPERLIPIDEMIA. \par SAW CARDIOLOGY. STARTED AVOPRO. BLOOD PRESSURE CONTROLLED. TAKING ALL MEDICATIONS AS DIRECTED. NO SIDE EFFECTS. SEES RHEUMATOLOGY ROUTINELY IN FOLLOW UP. ON PROLIA FOR  OSTEOPOROSIS. LAST BONE DENSITY 11/17. HAS HAD NO CHEST PAIN, SHORTNESS OF BREATH, HEADACHE, DIZZINESS, GI OR URINARY COMPLAINTS. NO OTHER COMPLAINTS TODAY.

## 2019-08-19 ENCOUNTER — MEDICATION RENEWAL (OUTPATIENT)
Age: 74
End: 2019-08-19

## 2019-10-23 ENCOUNTER — APPOINTMENT (OUTPATIENT)
Dept: FAMILY MEDICINE | Facility: CLINIC | Age: 74
End: 2019-10-23
Payer: MEDICARE

## 2019-10-23 VITALS
SYSTOLIC BLOOD PRESSURE: 110 MMHG | WEIGHT: 157 LBS | HEART RATE: 68 BPM | DIASTOLIC BLOOD PRESSURE: 70 MMHG | HEIGHT: 59 IN | BODY MASS INDEX: 31.65 KG/M2

## 2019-10-23 PROCEDURE — 99214 OFFICE O/P EST MOD 30 MIN: CPT

## 2019-10-23 NOTE — REVIEW OF SYSTEMS
[Fever] : no fever [Chills] : no chills [Fatigue] : no fatigue [Discharge] : no discharge [Pain] : no pain [Vision Problems] : no vision problems [Chest Pain] : no chest pain [Palpitations] : no palpitations [Lower Ext Edema] : no lower extremity edema [Shortness Of Breath] : no shortness of breath [Wheezing] : no wheezing [Cough] : no cough [Abdominal Pain] : no abdominal pain [Diarrhea] : diarrhea [Vomiting] : no vomiting [Melena] : no melena [Dysuria] : no dysuria [Hematuria] : no hematuria [Joint Pain] : no joint pain [Muscle Weakness] : no muscle weakness [Muscle Pain] : no muscle pain [Back Pain] : no back pain [Headache] : no headache [Dizziness] : no dizziness [Fainting] : no fainting [Suicidal] : not suicidal [Depression] : no depression

## 2019-10-23 NOTE — HISTORY OF PRESENT ILLNESS
[FreeTextEntry1] : F/U ANXIETY, HTN.  [de-identified] : MS. ROYAL IS PLEASANT 73 YO PRESENTING FOR FOLLOW UP. CHRONIC HISTORY OF HYPERTENSION AND HYPERLIPIDEMIA.  BLOOD PRESSURE CONTROLLED ON CURRENT MEDICATIONS. ON ATORVASTATIN FOR CHOLESTEROL. TAKING ALL MEDICATIONS AS DIRECTED. NO SIDE EFFECTS. SEES RHEUMATOLOGY ROUTINELY IN FOLLOW UP. ON PROLIA FOR OSTEOPOROSIS. LAST BONE DENSITY 10/19, RESULTS PENDING. HAS HAD NO RECENT FALLS. ALSO HISTORY OF ANXIETY. DOING WELL ON SERTRALINE. IS SOCIALIZING FREQUENTLY. GETTING ALONG WELL WITH OTHERS. GOOD SUPPORT SYSTEM. NOT FEELING DOWN OR DEPRESSED. NO RECENT PANIC ATTACKS. NOT ANXIOUS. NO SUICIDAL/HOMICIDAL IDEATIONS OR PLANS. HAS HAD NO CHEST PAIN, SHORTNESS OF BREATH, HEADACHE, DIZZINESS, GI OR URINARY COMPLAINTS. NO OTHER COMPLAINTS TODAY.

## 2019-10-23 NOTE — PLAN
[FreeTextEntry1] : CHECK ROUTINE LAB WORK \par DOING WELL ON SERTRALINE -WILL CONTINUE\par STRESS REDUCTIONS EXERCISES\par GOOD SUPPORT SYSTEM\par BLOOD PRESSURE CONTROLLED \par CONTINUE ALL MEDICATIONS AS DIRECTED\par FOLLOW-UP ALL SPECIALISTS AS DIRECTED \par MONITOR LAB WORK\par CALL WITH ANY QUESTIONS, CONCERNS OR CHANGES.

## 2019-10-23 NOTE — PHYSICAL EXAM
[No Acute Distress] : no acute distress [Well Nourished] : well nourished [No Lymphadenopathy] : no lymphadenopathy [Well-Appearing] : well-appearing [Supple] : supple [No Respiratory Distress] : no respiratory distress  [No Accessory Muscle Use] : no accessory muscle use [Regular Rhythm] : with a regular rhythm [Clear to Auscultation] : lungs were clear to auscultation bilaterally [Normal Rate] : normal rate  [Normal S1, S2] : normal S1 and S2 [No Murmur] : no murmur heard [Speech Grossly Normal] : speech grossly normal [Normal Affect] : the affect was normal [Normal Mood] : the mood was normal [Normal Insight/Judgement] : insight and judgment were intact [Pedal Pulses Present] : the pedal pulses are present [Soft] : abdomen soft [No Edema] : there was no peripheral edema [Non Tender] : non-tender [Normal Bowel Sounds] : normal bowel sounds [No Joint Swelling] : no joint swelling [Grossly Normal Strength/Tone] : grossly normal strength/tone

## 2019-10-25 ENCOUNTER — LABORATORY RESULT (OUTPATIENT)
Age: 74
End: 2019-10-25

## 2019-11-10 LAB
25(OH)D3 SERPL-MCNC: 43.6 NG/ML
ALBUMIN SERPL ELPH-MCNC: 4.5 G/DL
ALP BLD-CCNC: 71 U/L
ALT SERPL-CCNC: 22 U/L
ANION GAP SERPL CALC-SCNC: 14 MMOL/L
APPEARANCE: ABNORMAL
AST SERPL-CCNC: 19 U/L
BASOPHILS # BLD AUTO: 0.04 K/UL
BASOPHILS NFR BLD AUTO: 0.6 %
BILIRUB SERPL-MCNC: 0.4 MG/DL
BILIRUBIN URINE: NEGATIVE
BLOOD URINE: NEGATIVE
BUN SERPL-MCNC: 19 MG/DL
CALCIUM SERPL-MCNC: 10.3 MG/DL
CHLORIDE SERPL-SCNC: 103 MMOL/L
CHOLEST SERPL-MCNC: 159 MG/DL
CHOLEST/HDLC SERPL: 2.3 RATIO
CO2 SERPL-SCNC: 25 MMOL/L
COLOR: YELLOW
CREAT SERPL-MCNC: 0.88 MG/DL
EOSINOPHIL # BLD AUTO: 0.11 K/UL
EOSINOPHIL NFR BLD AUTO: 1.7 %
GLUCOSE QUALITATIVE U: NEGATIVE
GLUCOSE SERPL-MCNC: 98 MG/DL
HCT VFR BLD CALC: 40.3 %
HDLC SERPL-MCNC: 69 MG/DL
HGB BLD-MCNC: 12.7 G/DL
IMM GRANULOCYTES NFR BLD AUTO: 0.5 %
KETONES URINE: NEGATIVE
LDLC SERPL CALC-MCNC: 67 MG/DL
LEUKOCYTE ESTERASE URINE: ABNORMAL
LYMPHOCYTES # BLD AUTO: 1.16 K/UL
LYMPHOCYTES NFR BLD AUTO: 17.9 %
MAN DIFF?: NORMAL
MCHC RBC-ENTMCNC: 29.3 PG
MCHC RBC-ENTMCNC: 31.5 GM/DL
MCV RBC AUTO: 93.1 FL
MONOCYTES # BLD AUTO: 0.53 K/UL
MONOCYTES NFR BLD AUTO: 8.2 %
NEUTROPHILS # BLD AUTO: 4.62 K/UL
NEUTROPHILS NFR BLD AUTO: 71.1 %
NITRITE URINE: NEGATIVE
PH URINE: 7.5
PLATELET # BLD AUTO: 279 K/UL
POTASSIUM SERPL-SCNC: 4.4 MMOL/L
PROT SERPL-MCNC: 6.8 G/DL
PROTEIN URINE: NORMAL
RBC # BLD: 4.33 M/UL
RBC # FLD: 12.4 %
SODIUM SERPL-SCNC: 142 MMOL/L
SPECIFIC GRAVITY URINE: 1.03
T4 FREE SERPL-MCNC: 1.3 NG/DL
TRIGL SERPL-MCNC: 116 MG/DL
TSH SERPL-ACNC: 0.89 UIU/ML
UROBILINOGEN URINE: NORMAL
WBC # FLD AUTO: 6.49 K/UL

## 2019-11-11 ENCOUNTER — RX RENEWAL (OUTPATIENT)
Age: 74
End: 2019-11-11

## 2019-11-11 ENCOUNTER — MEDICATION RENEWAL (OUTPATIENT)
Age: 74
End: 2019-11-11

## 2019-11-15 ENCOUNTER — TRANSCRIPTION ENCOUNTER (OUTPATIENT)
Age: 74
End: 2019-11-15

## 2020-01-28 ENCOUNTER — APPOINTMENT (OUTPATIENT)
Dept: FAMILY MEDICINE | Facility: CLINIC | Age: 75
End: 2020-01-28
Payer: MEDICARE

## 2020-01-28 VITALS
DIASTOLIC BLOOD PRESSURE: 60 MMHG | BODY MASS INDEX: 31.65 KG/M2 | WEIGHT: 157 LBS | HEART RATE: 75 BPM | SYSTOLIC BLOOD PRESSURE: 110 MMHG | HEIGHT: 59 IN

## 2020-01-28 PROCEDURE — 99214 OFFICE O/P EST MOD 30 MIN: CPT

## 2020-01-28 NOTE — PHYSICAL EXAM
[No Acute Distress] : no acute distress [Well Nourished] : well nourished [Well-Appearing] : well-appearing [No Lymphadenopathy] : no lymphadenopathy [Supple] : supple [No Respiratory Distress] : no respiratory distress  [No Accessory Muscle Use] : no accessory muscle use [Clear to Auscultation] : lungs were clear to auscultation bilaterally [Normal Rate] : normal rate  [No Murmur] : no murmur heard [Regular Rhythm] : with a regular rhythm [Normal S1, S2] : normal S1 and S2 [Pedal Pulses Present] : the pedal pulses are present [No Edema] : there was no peripheral edema [Soft] : abdomen soft [Non Tender] : non-tender [Normal Bowel Sounds] : normal bowel sounds [No Joint Swelling] : no joint swelling [Grossly Normal Strength/Tone] : grossly normal strength/tone [Coordination Grossly Intact] : coordination grossly intact [No Focal Deficits] : no focal deficits [Normal Affect] : the affect was normal [Alert and Oriented x3] : oriented to person, place, and time [Normal Insight/Judgement] : insight and judgment were intact

## 2020-01-28 NOTE — REVIEW OF SYSTEMS
[Fever] : no fever [Chills] : no chills [Fatigue] : no fatigue [Discharge] : no discharge [Pain] : no pain [Chest Pain] : no chest pain [Palpitations] : no palpitations [Lower Ext Edema] : no lower extremity edema [Shortness Of Breath] : no shortness of breath [Wheezing] : no wheezing [Cough] : no cough [Abdominal Pain] : no abdominal pain [Nausea] : no nausea [Diarrhea] : diarrhea [Vomiting] : no vomiting [Dysuria] : no dysuria [Hematuria] : no hematuria [Headache] : no headache [Dizziness] : no dizziness [Fainting] : no fainting [Suicidal] : not suicidal [Anxiety] : no anxiety [Depression] : no depression [Easy Bleeding] : no easy bleeding [Easy Bruising] : no easy bruising

## 2020-01-28 NOTE — HISTORY OF PRESENT ILLNESS
[Friend] : friend [FreeTextEntry1] : F/U ANXIETY, HTN [de-identified] : MS. ROYAL IS A PLEASANT 73 YO PRESENTING FOR FOLLOW-UP. CHRONIC HISTORY OF HYPERTENSION AND HYPERLIPIDEMIA. BLOOD PRESSURE CONTROLLED ON CURRENT MEDICATIONS. ON ATORVASTATIN FOR CHOLESTEROL. IS TAKING OMEPRAZOLE FOR GERD PRESCRIBED BY PMD WHILE AWAY IN [place].  HAS HELPED.  NO ABDOMINAL PAIN OR N/V/D.  NO BLOOD IN STOOL.  HAS HAD NO RECENT FALLS. AMBULATING ON A CANE OR WALKER FOR SUPPORT. HAS NOT FOLLOWED UP WITH ORTHOPEDICS FOR BACK PAIN AS OF YET. IS TAKING VITAMIN D SUPPLEMENTS FOR VITAMIN D INSUFFICIENCY. CHRONIC HISTORY OF ANXIETY. DOING WELL ON ZOLOFT. NO RECENT PANIC ATTACKS. GETTING ALONG WELL WITH OTHERS. SLEEPING WELL. APPETITE IS GOOD. NOT DOWN OR DEPRESSED. COPING WITH STRESS. NO SUICIDAL/HOMICIDAL IDEATIONS OR PLANS. DUE FOR MAMMOGRAM. HAS NO BREAST COMPLAINTS. DENIES BREAST PAIN, LUMPS, NIPPLE DISCHARGE OR SKIN CHANGES. DUE TO SEE GYN. HAS HAD NO HEADACHES, DIZZINESS, CHEST PAIN, SHORTNESS OF BREATH, GI OR URINARY COMPLAINTS. NO OTHER COMPLAINTS TODAY.

## 2020-01-28 NOTE — PLAN
[FreeTextEntry1] : RECENT LAB WORK REVIEWED\par HEALTHY DIET AND LIFESTYLE MODIFICATIONS\par RX FOR BREAST IMAGING AND DUE TO SEE GYN\par FALL PRECAUTIONS\par BLOOD PRESSURE CONTROLLED\par GERD LIFESTYLE MODIFICATIONS REVIEWED; ELEVATE HOB, AVOID EATING 3 - 4 HOURS PRIOR TO BEDTIME, MONITOR FOR FOOD TRIGGERS AND AVOID \par CONTINUE ALL MEDICATIONS AS DIRECTED\par FOLLOW-UP WITH ALL SPECIALISTS AS DIRECTED\par CALL WITH ANY QUESTIONS, CONCERNS OR CHANGES

## 2020-03-03 ENCOUNTER — RX RENEWAL (OUTPATIENT)
Age: 75
End: 2020-03-03

## 2020-03-11 ENCOUNTER — APPOINTMENT (OUTPATIENT)
Dept: ORTHOPEDIC SURGERY | Facility: CLINIC | Age: 75
End: 2020-03-11

## 2020-04-08 ENCOUNTER — APPOINTMENT (OUTPATIENT)
Dept: ORTHOPEDIC SURGERY | Facility: CLINIC | Age: 75
End: 2020-04-08

## 2020-04-20 ENCOUNTER — APPOINTMENT (OUTPATIENT)
Dept: NEUROLOGY | Facility: CLINIC | Age: 75
End: 2020-04-20
Payer: MEDICARE

## 2020-04-20 PROCEDURE — G2012 BRIEF CHECK IN BY MD/QHP: CPT

## 2020-05-04 ENCOUNTER — APPOINTMENT (OUTPATIENT)
Dept: CARDIOLOGY | Facility: CLINIC | Age: 75
End: 2020-05-04
Payer: MEDICARE

## 2020-05-04 VITALS
HEIGHT: 59 IN | BODY MASS INDEX: 30.84 KG/M2 | SYSTOLIC BLOOD PRESSURE: 119 MMHG | HEART RATE: 69 BPM | WEIGHT: 153 LBS | DIASTOLIC BLOOD PRESSURE: 69 MMHG

## 2020-05-04 DIAGNOSIS — R01.1 CARDIAC MURMUR, UNSPECIFIED: ICD-10-CM

## 2020-05-04 DIAGNOSIS — K21.9 GASTRO-ESOPHAGEAL REFLUX DISEASE W/OUT ESOPHAGITIS: ICD-10-CM

## 2020-05-04 PROCEDURE — 99202 OFFICE O/P NEW SF 15 MIN: CPT | Mod: 95

## 2020-05-04 PROCEDURE — 99213 OFFICE O/P EST LOW 20 MIN: CPT | Mod: 95

## 2020-05-04 RX ORDER — OMEPRAZOLE 20 MG/1
20 CAPSULE, DELAYED RELEASE ORAL
Qty: 90 | Refills: 0 | Status: DISCONTINUED | COMMUNITY
Start: 1900-01-01 | End: 2020-05-04

## 2020-05-04 NOTE — PHYSICAL EXAM
[Well Groomed] : well groomed [Oriented To Time, Place, And Person] : oriented to person, place, and time [Normal Appearance] : normal appearance [Impaired Insight] : insight and judgment were intact

## 2020-05-04 NOTE — ASSESSMENT
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 \par patient was advised to see us in 6 months if stable and certainly earlier with any new symptoms.\par

## 2020-05-04 NOTE — REVIEW OF SYSTEMS
[Headache] : no headache [Recent Weight Gain (___ Lbs)] : no recent weight gain [Feeling Fatigued] : not feeling fatigued [Recent Weight Loss (___ Lbs)] : no recent weight loss [Blurry Vision] : blurred vision [Seeing Double (Diplopia)] : no diplopia [Eyeglasses] : not currently wearing eyeglasses [Earache] : no earache [Discharge From The Ears] : no discharge from the ears [Mouth Sores] : no mouth sores [Dyspnea on exertion] : dyspnea during exertion [Chest Pain] : no chest pain [Lower Ext Edema] : no extremity edema [Palpitations] : no palpitations [Cough] : no cough [Wheezing] : no wheezing [Abdominal Pain] : no abdominal pain [Nausea] : no nausea [Change in Appetite] : no change in appetite [Heartburn] : no heartburn [Pelvic Pain] : no pelvic pain [Dysuria] : no dysuria [Incontinence] : no incontinence [Muscle Cramps] : no muscle cramps [Joint Swelling] : no joint swelling [Joint Pain] : joint pain [Dizziness] : no dizziness [Skin: A Rash] : no rash: [Skin Lesions] : no skin lesions [Confusion] : no confusion was observed [Convulsions] : no convulsions [Tremor] : no tremor was seen [Excessive Thirst] : no polydipsia [Memory Lapses Or Loss] : no memory lapses or loss [Easy Bruising] : no tendency for easy bruising [Easy Bleeding] : no tendency for easy bleeding

## 2020-05-04 NOTE — HISTORY OF PRESENT ILLNESS
[Home] : at home, [unfilled] , at the time of the visit. 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 \par \par Cardiac HODGES from may 2019: \par Carotid duplex with mild carotid plaque, echocardiogram normal LVEF, grade I diastolic dysfunction and mild aortic valve regurgitation\par Pharmacologic nuclear study was negative for ischemia.\par \par \par  [FreeTextEntry2] : Shabnam Delarosa [FreeTextEntry4] : JOHNNY Riley

## 2020-05-14 ENCOUNTER — OUTPATIENT (OUTPATIENT)
Dept: OUTPATIENT SERVICES | Facility: HOSPITAL | Age: 75
LOS: 1 days | End: 2020-05-14
Payer: MEDICARE

## 2020-05-14 ENCOUNTER — APPOINTMENT (OUTPATIENT)
Dept: FAMILY MEDICINE | Facility: CLINIC | Age: 75
End: 2020-05-14
Payer: MEDICARE

## 2020-05-14 ENCOUNTER — APPOINTMENT (OUTPATIENT)
Dept: RADIOLOGY | Facility: CLINIC | Age: 75
End: 2020-05-14
Payer: MEDICARE

## 2020-05-14 VITALS
HEART RATE: 76 BPM | WEIGHT: 155 LBS | SYSTOLIC BLOOD PRESSURE: 118 MMHG | BODY MASS INDEX: 31.25 KG/M2 | HEIGHT: 59 IN | DIASTOLIC BLOOD PRESSURE: 60 MMHG

## 2020-05-14 DIAGNOSIS — Z98.2 PRESENCE OF CEREBROSPINAL FLUID DRAINAGE DEVICE: Chronic | ICD-10-CM

## 2020-05-14 DIAGNOSIS — M54.2 CERVICALGIA: ICD-10-CM

## 2020-05-14 PROCEDURE — 36415 COLL VENOUS BLD VENIPUNCTURE: CPT

## 2020-05-14 PROCEDURE — 72040 X-RAY EXAM NECK SPINE 2-3 VW: CPT

## 2020-05-14 PROCEDURE — 72040 X-RAY EXAM NECK SPINE 2-3 VW: CPT | Mod: 26

## 2020-05-14 PROCEDURE — 99214 OFFICE O/P EST MOD 30 MIN: CPT | Mod: 25

## 2020-05-14 NOTE — HISTORY OF PRESENT ILLNESS
[FreeTextEntry8] : MS. ROYAL IS A PLEASANT 73 YO PRESENTING FOR ACUTE VISIT WITH HER DAUGHTER TODAY.  NOTES THAT SHE HAS HAD A BURNING SENSATION TO THE SKIN ON THE LEFT SIDE OF HER NECK FOR THE PAST TWO WEEKS.  CAN HAVE AT REST.  NO RASH HAS BEEN SEEN AS OF YET.  NO TRAUMA.  DENIES NUMBNESS/TINGLING SENSATION TO AREA OR EXTREMITIES.  HAS NOT HAD IN THE PAST.  IS NOT SURE IF SHE HAS ANY NECK PAIN, THINKS SHE MAY AT TIMES - DIFFICULT TO OBTAIN HISTORY.  SEEING NEUROLOGY ROUTINELY.  NEVER SAW ORTHOPEDICS AS DIRECTED PREVIOUSLY.  CHRONIC HISTORY OF HYPERTENSION, HYPERLIPIDEMIA AND VITAMIN D INSUFFICIENCY.  BLOOD PRESSURE CONTROLLED.  STILL ON LIPITOR FOR CHOLESTEROL.  NO OTHER COMPLAINTS TODAY.

## 2020-05-14 NOTE — REVIEW OF SYSTEMS
[Negative] : Gastrointestinal [Joint Pain] : no joint pain [Joint Swelling] : no joint swelling [Muscle Weakness] : no muscle weakness [Itching] : no itching [Skin Rash] : no skin rash [Headache] : no headache [Dizziness] : no dizziness [Fainting] : no fainting [Easy Bleeding] : no easy bleeding [Easy Bruising] : no easy bruising

## 2020-05-14 NOTE — PLAN
[FreeTextEntry1] : SYMPTOMS COULD BE DUE TO SHINGLES STARTING - WILL GIVE VALTREX; DISCUSSED AND AGREED\par WILL ALSO CHECK XRAY NECK\par RE-ADVISED ORTHOPEDICS FOLLOW-UP\par BLOOD PRESSURE CONTROLLED\par MONITOR LAB WORK INCLUDING LIPIDS\par FOLLOW-UP ALL SPECIALISTS AS DIRECTED \par CALL WITH ANY QUESTIONS, CONCERNS OR CHANGES

## 2020-05-14 NOTE — PHYSICAL EXAM
[No Acute Distress] : no acute distress [Well Nourished] : well nourished [Well-Appearing] : well-appearing [Normal Sclera/Conjunctiva] : normal sclera/conjunctiva [PERRL] : pupils equal round and reactive to light [No Lymphadenopathy] : no lymphadenopathy [Supple] : supple [No Respiratory Distress] : no respiratory distress  [No Accessory Muscle Use] : no accessory muscle use [Clear to Auscultation] : lungs were clear to auscultation bilaterally [Normal Rate] : normal rate  [Regular Rhythm] : with a regular rhythm [Normal S1, S2] : normal S1 and S2 [Soft] : abdomen soft [Non Tender] : non-tender [Normal Bowel Sounds] : normal bowel sounds [No Focal Deficits] : no focal deficits [Speech Grossly Normal] : speech grossly normal [Memory Grossly Normal] : memory grossly normal [Normal Mood] : the mood was normal [de-identified] : NON-TENDER

## 2020-05-15 LAB
25(OH)D3 SERPL-MCNC: 47.9 NG/ML
BASOPHILS # BLD AUTO: 0.04 K/UL
BASOPHILS NFR BLD AUTO: 0.8 %
CHOLEST SERPL-MCNC: 156 MG/DL
CHOLEST/HDLC SERPL: 2.4 RATIO
EOSINOPHIL # BLD AUTO: 0.1 K/UL
EOSINOPHIL NFR BLD AUTO: 2.1 %
HCT VFR BLD CALC: 37.9 %
HDLC SERPL-MCNC: 66 MG/DL
HGB BLD-MCNC: 12.5 G/DL
IMM GRANULOCYTES NFR BLD AUTO: 0.2 %
LDLC SERPL CALC-MCNC: 56 MG/DL
LYMPHOCYTES # BLD AUTO: 0.94 K/UL
LYMPHOCYTES NFR BLD AUTO: 19.5 %
MAN DIFF?: NORMAL
MCHC RBC-ENTMCNC: 29.9 PG
MCHC RBC-ENTMCNC: 33 GM/DL
MCV RBC AUTO: 90.7 FL
MONOCYTES # BLD AUTO: 0.47 K/UL
MONOCYTES NFR BLD AUTO: 9.8 %
NEUTROPHILS # BLD AUTO: 3.25 K/UL
NEUTROPHILS NFR BLD AUTO: 67.6 %
PLATELET # BLD AUTO: 281 K/UL
RBC # BLD: 4.18 M/UL
RBC # FLD: 12.7 %
T4 FREE SERPL-MCNC: 1.3 NG/DL
TRIGL SERPL-MCNC: 172 MG/DL
TSH SERPL-ACNC: 0.55 UIU/ML
VIT B12 SERPL-MCNC: 805 PG/ML
WBC # FLD AUTO: 4.81 K/UL

## 2020-05-20 LAB
ALBUMIN SERPL ELPH-MCNC: 4.2 G/DL
ALP BLD-CCNC: 73 U/L
ALT SERPL-CCNC: 20 U/L
ANION GAP SERPL CALC-SCNC: 12 MMOL/L
AST SERPL-CCNC: 18 U/L
BILIRUB SERPL-MCNC: 0.3 MG/DL
BUN SERPL-MCNC: 23 MG/DL
CALCIUM SERPL-MCNC: 11 MG/DL
CHLORIDE SERPL-SCNC: 104 MMOL/L
CO2 SERPL-SCNC: 26 MMOL/L
CREAT SERPL-MCNC: 0.92 MG/DL
GLUCOSE SERPL-MCNC: 108 MG/DL
POTASSIUM SERPL-SCNC: 4.3 MMOL/L
PROT SERPL-MCNC: 6.5 G/DL
SODIUM SERPL-SCNC: 142 MMOL/L

## 2020-06-24 ENCOUNTER — APPOINTMENT (OUTPATIENT)
Dept: FAMILY MEDICINE | Facility: CLINIC | Age: 75
End: 2020-06-24
Payer: MEDICARE

## 2020-06-24 ENCOUNTER — NON-APPOINTMENT (OUTPATIENT)
Age: 75
End: 2020-06-24

## 2020-06-24 VITALS
BODY MASS INDEX: 31.25 KG/M2 | SYSTOLIC BLOOD PRESSURE: 136 MMHG | HEART RATE: 64 BPM | WEIGHT: 155 LBS | HEIGHT: 59 IN | DIASTOLIC BLOOD PRESSURE: 74 MMHG

## 2020-06-24 PROCEDURE — G0439: CPT

## 2020-06-24 PROCEDURE — 93000 ELECTROCARDIOGRAM COMPLETE: CPT

## 2020-06-24 PROCEDURE — 36415 COLL VENOUS BLD VENIPUNCTURE: CPT

## 2020-06-24 NOTE — HEALTH RISK ASSESSMENT
[Good] : ~his/her~  mood as  good [No] : In the past 12 months have you used drugs other than those required for medical reasons? No [One fall no injury in past year] : Patient reported one fall in the past year without injury [HIV test declined] : HIV test declined [Hepatitis C test declined] : Hepatitis C test declined [None] : None [Retired] : retired [Alone] : lives alone [# Of Children ___] : has [unfilled] children [High School] : high school [] :  [Fully functional (bathing, dressing, toileting, transferring, walking, feeding)] : Fully functional (bathing, dressing, toileting, transferring, walking, feeding) [Feels Safe at Home] : Feels safe at home [Fully functional (using the telephone, shopping, preparing meals, housekeeping, doing laundry, using] : Fully functional and needs no help or supervision to perform IADLs (using the telephone, shopping, preparing meals, housekeeping, doing laundry, using transportation, managing medications and managing finances) [Reports normal functional visual acuity (ie: able to read med bottle)] : Reports normal functional visual acuity [Smoke Detector] : smoke detector [Carbon Monoxide Detector] : carbon monoxide detector [Safety elements used in home] : safety elements used in home [Seat Belt] :  uses seat belt [Sunscreen] : uses sunscreen [With Patient/Caregiver] : With Patient/Caregiver [Designated Healthcare Proxy] : Designated healthcare proxy [Relationship: ___] : Relationship: [unfilled] [Name: ___] : Health Care Proxy's Name: [unfilled]  [] : No [de-identified] : USING PEDAL EXERCISE MACHINE - MOST DAYS [de-identified] : DIET OVERALL GOOD [de-identified] : WAS BY HER SINK.  TURNED QUICKLY AND ACCIDENTLY FELL [Change in mental status noted] : No change in mental status noted [Language] : denies difficulty with language [Learning/Retaining New Information] : denies difficulty learning/retaining new information [Handling Complex Tasks] : denies difficulty handling complex tasks [Reports changes in hearing] : Reports no changes in hearing [Reports changes in vision] : Reports no changes in vision [MammogramDate] : 02/20 [PapSmearComments] : OVERDUE [BoneDensityDate] : 10/19 [ColonoscopyDate] : 05/08 [de-identified] : FAMILY IN SAME BUILDING [de-identified] : GLASSES FOR DISTANCE AND READING [de-identified] : DENTAL PLATES [AdvancecareDate] : 06/20 [FreeTextEntry4] : SECONDARY HEALTH CARE PROXY:\kyra SANCHEZ - DAUGHTER

## 2020-06-24 NOTE — HISTORY OF PRESENT ILLNESS
[FreeTextEntry1] : CPE [de-identified] : MS. ROYAL IS A PLEASANT 75 YO PRESENTING FOR CPE. CHRONIC HISTORY OF HYPERTENSION, HYPERLIPIDEMIA AND VITAMIN D INSUFFICIENCY. TO SEE CARDIOLOGY DR. DEL TORO AS NEW PATIENT IN NOVEMBER.  IS UNSURE IF SHE IS TAKING HER CALCIUM SUPPLEMENTS - WAS ADVISED TO HOLD FOR ELEVATED LEVEL ON LAB WORK.  TAKING ALL MEDICATIONS AS DIRECTED.  PLANS TO FOLLOW-UP WITH ORTHOPEDICS FOR CHRONIC BACK PAIN - PRIOR EVALUATION.

## 2020-06-24 NOTE — PLAN
[FreeTextEntry1] : DUE TO SEE GYN - DR. GRUBER\par COLONOSCOPY\par FLU VACCINE IN THE FALL\par VISION SCREENING\par HEALTHY DIET AND LIFESTYLE MODIFICATIONS\par CHECK LAB WORK\par EKG: SINUS AT 55 BPM, NO ACUTE ST-T WAVE CHANGES\par FOLLOW-UP ALL SPECIALISTS AS DIRECTED \par CALL WITH ANY QUESTIONS, CONCERNS OR CHANGES

## 2020-06-24 NOTE — PHYSICAL EXAM
[No Acute Distress] : no acute distress [Well Nourished] : well nourished [Well-Appearing] : well-appearing [Normal Sclera/Conjunctiva] : normal sclera/conjunctiva [PERRL] : pupils equal round and reactive to light [Normal Outer Ear/Nose] : the outer ears and nose were normal in appearance [Normal Oropharynx] : the oropharynx was normal [Normal TMs] : both tympanic membranes were normal [No Lymphadenopathy] : no lymphadenopathy [Supple] : supple [No Respiratory Distress] : no respiratory distress  [No Accessory Muscle Use] : no accessory muscle use [Clear to Auscultation] : lungs were clear to auscultation bilaterally [Normal Rate] : normal rate  [Regular Rhythm] : with a regular rhythm [Normal S1, S2] : normal S1 and S2 [Soft] : abdomen soft [Non Tender] : non-tender [Normal Bowel Sounds] : normal bowel sounds [No Joint Swelling] : no joint swelling [Grossly Normal Strength/Tone] : grossly normal strength/tone [No Rash] : no rash [Coordination Grossly Intact] : coordination grossly intact [No Focal Deficits] : no focal deficits [Normal Gait] : normal gait [Deep Tendon Reflexes (DTR)] : deep tendon reflexes were 2+ and symmetric [Speech Grossly Normal] : speech grossly normal [Memory Grossly Normal] : memory grossly normal [Normal Affect] : the affect was normal [Normal Mood] : the mood was normal

## 2020-07-07 ENCOUNTER — APPOINTMENT (OUTPATIENT)
Dept: ORTHOPEDIC SURGERY | Facility: CLINIC | Age: 75
End: 2020-07-07
Payer: MEDICARE

## 2020-07-07 VITALS
DIASTOLIC BLOOD PRESSURE: 83 MMHG | SYSTOLIC BLOOD PRESSURE: 143 MMHG | BODY MASS INDEX: 31.45 KG/M2 | HEIGHT: 59 IN | HEART RATE: 73 BPM | WEIGHT: 156 LBS

## 2020-07-07 VITALS — TEMPERATURE: 97.8 F

## 2020-07-07 DIAGNOSIS — Z87.898 PERSONAL HISTORY OF OTHER SPECIFIED CONDITIONS: ICD-10-CM

## 2020-07-07 PROCEDURE — 99215 OFFICE O/P EST HI 40 MIN: CPT

## 2020-07-07 NOTE — HISTORY OF PRESENT ILLNESS
[de-identified] : Patient is a very pleasant 75-year-old female who presents for evaluation of intermittent posterior cervical pain. She states the cervical pain is more of a burning-type quality. Primarily on the patient's left-hand side. A response to anti-inflammatories and improves with activity. More concerning she is also complaining of a gait abnormality she is complaining of multiple falls she is now utilizing a cane for ambulation. Based upon falls and subjective weakness some edema in her VIVIEN a question as positive. [___ yrs] : [unfilled] year(s) ago [Worsening] : worsening [2] : a current pain level of 2/10 [10] : a maximum pain level of 10/10 [Exercise Regimen] : relieved by exercise regimen [Weight Bearing] : worsened by weight bearing [Walking] : worsened by walking [Heat] : relieved by heat [Rest] : relieved by rest [NSAIDs] : relieved by nonsteroidal anti-inflammatory drugs [Ataxia] : no ataxia [Incontinence] : no incontinence [Loss of Dexterity] : good dexterity [Urinary Ret.] : no urinary retention

## 2020-07-07 NOTE — DISCUSSION/SUMMARY
[de-identified] : Patient with multiple medical comorbidity increasing the risk of perioperative and postoperative complications as well as diminished spine outcomes as per the current medical literature. These include but are not limited to obesity, anxiety/depression, cardiac illness, kidney disease, peripheral vascular disease, history of cancer, COPD, dysmetabolic syndrome including but not limited to diabetes, hyperlipidemia, hypertension. Patient is being referred back to primary care provider for medical optimization, as well as other appropriate specialists as needed for optimization prior to spine surgery. \par \par Am ordering a cervical MRI based upon this patient's positive VIVIEN a questionnaire gait abnormality as well subtle bilateral lower extremity clonus. On evaluating for cervical myelopathy and the potential for surgical decompression versus continued conservative care. With regard to her history of spondylolisthesis at followup we'll discuss obtaining a lumbar MRI of her spine evaluation.Physical therapy is also been recommended in the form of soft tissue modalities as well as gait and balance training. [facility] clinical assessment like to obtain cervical flexion and extension x-rays as well as standing lumbar x-rays

## 2020-07-07 NOTE — PHYSICAL EXAM
[de-identified] : Cervical x-ray taken at [facility]/outside facility and is positive for advanced cervical spondylosis [de-identified] : CONSTITUTIONAL:  Patient is a very pleasant individual who is well-nourished and appears stated age. \par PSYCHIATRIC:  Alert and oriented times three and in no apparent distress, and participates with orthopedic evaluation well.\par HEAD:  Atraumatic and  nonsyndromic in appearance.\par EENT: No thyromegaly, EOMI.\par RESPIRATORY:  Respiratory rate is regular, not dyspneic on examination.\par LYMPHATICS:  There is no cervical or axillary lymphadenopathy.\par INTEGUMENTARY:  Skin is clean, dry, and intact about the bilateral upper extremities and cervical spine. \par VASCULAR:   There is brisk capillary refill about the bilateral upper extremities and radial pulses are 2/4. \par NEUROLOGIC:  Negative L'hirmitte, negative Spurling's sign. There are subtle pathologic reflexes In the form of clonus possibly one beat. There is no decrease in sensation of the bilateral upper extremities on Wartenberg pinwheel/manual examination.  Deep tendon reflexes are well-maintained at +2/4 of the bilateral upper extremities and are symmetric.\par MUSCULOSKELETAL:  There is no visible muscular atrophy.  Manual motor strength is well maintained in the bilateral upper extremities.  Cervical range of motion is well maintained.  The patient ambulates in a non-myelopathic manner. Normal secondary orthopaedic exam of bilateral shoulders, elbows and hands.  Elbow flexion and extension, wrist extension, finger flexion and abduction are well maintained. Ambulates with a cane\par \par

## 2020-07-15 ENCOUNTER — OUTPATIENT (OUTPATIENT)
Dept: OUTPATIENT SERVICES | Facility: HOSPITAL | Age: 75
LOS: 1 days | End: 2020-07-15

## 2020-07-15 ENCOUNTER — APPOINTMENT (OUTPATIENT)
Dept: MRI IMAGING | Facility: CLINIC | Age: 75
End: 2020-07-15
Payer: MEDICARE

## 2020-07-15 DIAGNOSIS — M54.2 CERVICALGIA: ICD-10-CM

## 2020-07-15 DIAGNOSIS — Z98.2 PRESENCE OF CEREBROSPINAL FLUID DRAINAGE DEVICE: Chronic | ICD-10-CM

## 2020-07-15 PROCEDURE — 72141 MRI NECK SPINE W/O DYE: CPT | Mod: 26

## 2020-07-27 LAB
ALBUMIN SERPL ELPH-MCNC: 4.6 G/DL
ALP BLD-CCNC: 63 U/L
ALT SERPL-CCNC: 14 U/L
ANION GAP SERPL CALC-SCNC: 12 MMOL/L
AST SERPL-CCNC: 14 U/L
BILIRUB SERPL-MCNC: 0.3 MG/DL
BUN SERPL-MCNC: 27 MG/DL
CALCIUM SERPL-MCNC: 11 MG/DL
CHLORIDE SERPL-SCNC: 103 MMOL/L
CO2 SERPL-SCNC: 26 MMOL/L
CREAT SERPL-MCNC: 1 MG/DL
GLUCOSE SERPL-MCNC: 99 MG/DL
POTASSIUM SERPL-SCNC: 4.2 MMOL/L
PROT SERPL-MCNC: 6.4 G/DL
SODIUM SERPL-SCNC: 141 MMOL/L

## 2020-07-28 ENCOUNTER — APPOINTMENT (OUTPATIENT)
Dept: ORTHOPEDIC SURGERY | Facility: CLINIC | Age: 75
End: 2020-07-28
Payer: MEDICARE

## 2020-07-28 VITALS
HEIGHT: 59 IN | SYSTOLIC BLOOD PRESSURE: 140 MMHG | DIASTOLIC BLOOD PRESSURE: 80 MMHG | BODY MASS INDEX: 31.25 KG/M2 | WEIGHT: 155 LBS | HEART RATE: 72 BPM

## 2020-07-28 VITALS — TEMPERATURE: 99.1 F

## 2020-07-28 DIAGNOSIS — M54.6 PAIN IN THORACIC SPINE: ICD-10-CM

## 2020-07-28 DIAGNOSIS — M43.17 SPONDYLOLISTHESIS, LUMBOSACRAL REGION: ICD-10-CM

## 2020-07-28 DIAGNOSIS — M47.814 SPONDYLOSIS W/OUT MYELOPATHY OR RADICULOPATHY, THORACIC REGION: ICD-10-CM

## 2020-07-28 DIAGNOSIS — M67.952 UNSPECIFIED DISORDER OF SYNOVIUM AND TENDON, LEFT THIGH: ICD-10-CM

## 2020-07-28 DIAGNOSIS — R26.81 UNSTEADINESS ON FEET: ICD-10-CM

## 2020-07-28 PROCEDURE — 99214 OFFICE O/P EST MOD 30 MIN: CPT

## 2020-07-28 PROCEDURE — 72100 X-RAY EXAM L-S SPINE 2/3 VWS: CPT | Mod: 26

## 2020-07-28 PROCEDURE — 72050 X-RAY EXAM NECK SPINE 4/5VWS: CPT | Mod: 26

## 2020-07-28 NOTE — ADDENDUM
[FreeTextEntry1] : Documented by Jeremias Bhakta acting as a scribe for BLAKE Louis on 07/28/2020\par All medical record entries made by the Scribe were at my, BLAKE Louis, direction and personally dictated by me on 07/28/2020 . I have reviewed the chart and agree that the record accurately reflects my personal performance of the history, physical exam, assessment and plan. I have also personally directed, reviewed, and agreed with the chart.

## 2020-07-28 NOTE — PHYSICAL EXAM
[Obese] : obese [Poor Appearance] : well-appearing [Acute Distress] : not in acute distress [de-identified] : CONSTITUTIONAL: Patient is a very pleasant individual who is well-nourished and appears stated age. Ambulates with a cane.\par PSYCHIATRIC: Alert and oriented times three and in no apparent distress, and participates with orthopedic evaluation well.\par HEAD: Atraumatic and nonsyndromic in appearance.\par EENT: No thyromegaly, EOMI.\par RESPIRATORY: Respiratory rate is regular, not dyspneic on examination.\par LYMPHATICS: There is no cervical or axillary lymphadenopathy.\par INTEGUMENTARY: Skin is clean, dry, and intact about the bilateral upper extremities and cervical spine. \par VASCULAR: There is brisk capillary refill about the bilateral upper extremities and radial pulses are 2/4. \par NEUROLOGIC: Negative L'hirmitte, negative Spurling's sign. There are subtle pathologic reflexes In the form of clonus possibly one beat. There is no decrease in sensation of the bilateral upper extremities on Wartenberg pinwheel/manual examination. Deep tendon reflexes are well-maintained at +2/4 of the bilateral upper extremities and are symmetric.\par MUSCULOSKELETAL: There is no visible muscular atrophy. Manual motor strength is well maintained in the bilateral upper extremities. Cervical range of motion is well maintained. Normal secondary orthopaedic exam of bilateral shoulders, elbows and hands. Elbow flexion and extension, wrist extension, finger flexion and abduction are well maintained. Mechanically orientated low back pain, L>R. pain on left lower gluteus insertion, radiating across her low back. subjective occasional sciatica down LLE. [de-identified] : Cervical flexion extension XR taken in office today shows focal kyphosis C5-C6. Standing lumbar XR taken in office today shows age appropriate spondylosis with spondylolisthesis L4-L5, facet hypertrophy L4-L5, L5-S1.\par \par Cervical MRI from 7/15/2020 at [facility] shows no abnormal T2 signal of the cord. Moderate spondylosis C4-C5, C5-C6. C4-C5 as well as C6-C7 spondylolisthesis. No abnormal signal.

## 2020-07-28 NOTE — DISCUSSION/SUMMARY
[de-identified] : Pt is going to start a course of PT in the near future for her neck as well as her low back. and left gluteus insertion, and I will provide her with a new Rx. Avoid hyperextension of cervical spine. We also spoke about the benefits of using heat, ice, and Bengay cream. A thoracic and lumbar MRI has been ordered and is medically necessary due to persistent pain, clonus on physical exam, ataxia. MRI will help guide treatment plan, possible surgical intervention vs injection therapy with pain management. The patient will follow up after the MRI results have been obtained / 6-8 weeks.\par \par Patient with multiple medical comorbidity increasing the risk of perioperative and postoperative complications as well as diminished spine outcomes as per the current medical literature.  These include but are not limited to obesity, anxiety/depression, cardiac illness, kidney disease, peripheral vascular disease, history of cancer, COPD, dysmetabolic syndrome including but not limited to diabetes, hyperlipidemia, hypertension.  Patient is being referred back to primary care provider for medical optimization, as well as other appropriate specialists as needed for optimization prior to spine surgery.

## 2020-07-28 NOTE — HISTORY OF PRESENT ILLNESS
Problem: Patient Care Overview  Goal: Plan of Care Review  Outcome: Ongoing (interventions implemented as appropriate)  Flowsheets (Taken 2/16/2020 1193)  Progress: no change  Plan of Care Reviewed With: patient  Outcome Summary: VSS; pt c/o pain- controlled with pain medication; amb in martínez this shift; multiple BMs      [de-identified] : 75 year old F resents for evaluation of intermittent posterior cervical pain. She states the cervical pain is more of a burning-type quality. Primarily on the patient's left-hand side. A response to anti-inflammatories and improves with activity. More concerning she is also complaining of a gait abnormality she is complaining of multiple falls she is now utilizing a cane for ambulation. Based upon falls and subjective weakness some edema in her VIVIEN a question as positive. She states the symptoms are worsening. The patient mentions the symptoms started 2+ year(s) ago. Pain levels include a current pain level of 2/10 and a maximum pain level of 10/10. She has not been to PT yet. She has a shunt in her brain. [Incontinence] : no incontinence [Urinary Ret.] : no urinary retention [Loss of Dexterity] : good dexterity

## 2020-08-06 ENCOUNTER — APPOINTMENT (OUTPATIENT)
Dept: MRI IMAGING | Facility: CLINIC | Age: 75
End: 2020-08-06
Payer: MEDICARE

## 2020-08-06 ENCOUNTER — RESULT REVIEW (OUTPATIENT)
Age: 75
End: 2020-08-06

## 2020-08-06 ENCOUNTER — OUTPATIENT (OUTPATIENT)
Dept: OUTPATIENT SERVICES | Facility: HOSPITAL | Age: 75
LOS: 1 days | End: 2020-08-06

## 2020-08-06 DIAGNOSIS — Z98.2 PRESENCE OF CEREBROSPINAL FLUID DRAINAGE DEVICE: Chronic | ICD-10-CM

## 2020-08-06 DIAGNOSIS — M47.12 OTHER SPONDYLOSIS WITH MYELOPATHY, CERVICAL REGION: ICD-10-CM

## 2020-08-06 PROCEDURE — 72148 MRI LUMBAR SPINE W/O DYE: CPT | Mod: 26

## 2020-08-06 PROCEDURE — 72146 MRI CHEST SPINE W/O DYE: CPT | Mod: 26

## 2020-08-20 ENCOUNTER — APPOINTMENT (OUTPATIENT)
Dept: ORTHOPEDIC SURGERY | Facility: CLINIC | Age: 75
End: 2020-08-20
Payer: MEDICARE

## 2020-08-20 VITALS
SYSTOLIC BLOOD PRESSURE: 123 MMHG | DIASTOLIC BLOOD PRESSURE: 73 MMHG | HEIGHT: 59 IN | WEIGHT: 155 LBS | BODY MASS INDEX: 31.25 KG/M2 | HEART RATE: 73 BPM

## 2020-08-20 VITALS — TEMPERATURE: 98.1 F

## 2020-08-20 DIAGNOSIS — G91.9 HYDROCEPHALUS, UNSPECIFIED: ICD-10-CM

## 2020-08-20 DIAGNOSIS — M43.16 SPONDYLOLISTHESIS, LUMBAR REGION: ICD-10-CM

## 2020-08-20 DIAGNOSIS — Z86.69 PERSONAL HISTORY OF OTHER DISEASES OF THE NERVOUS SYSTEM AND SENSE ORGANS: ICD-10-CM

## 2020-08-20 PROCEDURE — 99215 OFFICE O/P EST HI 40 MIN: CPT

## 2020-08-20 NOTE — HISTORY OF PRESENT ILLNESS
[Ataxia] : no ataxia [de-identified] : 75 year F presents for physical therapy follow up as well as lumbar and thoracic MRI results.Patient states she has begun physical therapy and sees some relief thus far.  [Incontinence] : no incontinence [Loss of Dexterity] : good dexterity [Urinary Ret.] : no urinary retention

## 2020-08-20 NOTE — PHYSICAL EXAM
[Obese] : obese [Poor Appearance] : well-appearing [Acute Distress] : not in acute distress [de-identified] : CONSTITUTIONAL: The patient is a very pleasant individual who is well-nourished and who appears stated age.\par PSYCHIATRIC: The patient is alert and oriented X 3 and in no apparent distress, and participates with orthopedic evaluation well.\par HEAD: Atraumatic and is nonsyndromic in appearance.\par EENT: No visible thyromegaly, EOMI.\par RESPIRATORY: Respiratory rate is regular, not dyspneic on examination.\par LYMPHATICS: There is no inguinal lymphadenopathy\par INTEGUMENTARY: Skin is clean, dry, and intact about the bilateral lower extremities and lumbar spine.\par VASCULAR: There is brisk capillary refill about the bilateral lower extremities.\par NEUROLOGIC:  pathologic reflexes noted and consistent. There is no decrease in sensation of the bilateral lower extremities on Wartenberg pinwheel examination. Deep tendon reflexes are well maintained at 2+/4 of the bilateral lower extremities and are symmetric.\par MUSCULOSKELETAL: There is no visible muscular atrophy. Manual motor strength is well maintained in the bilateral lower extremities. The patient ambulates in a non-myelopathic manner. Negative tension sign and straight leg raise bilaterally. Quad extension, ankle dorsiflexion, EHL, plantar flexion, and ankle eversion are well preserved. Normal secondary orthopaedic exam of bilateral hips, greater trochanteric area, knees and ankles \par \par mechanically oriented cervical and lumbar back pain.  [de-identified] : MRI of the lumbar spine from [facility] from august of 2020 shows no myomalacia changes age appropriate spondylosis \par MRI of the thoracic spine from [facility] imaging from august 2020 shows mild spondylolisthesis at L4-L5 with moderate to severe spinal stenosis.

## 2020-08-20 NOTE — DISCUSSION/SUMMARY
[de-identified] : I have recommended that the pt continue with a conservative treatment plan such as continuing her physical therapy. Patient has been advised on a course of steroids. The patient will follow up in 2 months for a repeat clinical assessment. If pain continues injection therapy will be recommended. \par \par Patient with multiple medical comorbidity increasing the risk of perioperative and postoperative complications as well as diminished spine outcomes as per the current medical literature. These include but are not limited to obesity, anxiety/depression, cardiac illness, kidney disease, peripheral vascular disease, history of cancer, COPD, dysmetabolic syndrome including but not limited to diabetes, hyperlipidemia, hypertension. Patient is being referred back to primary care provider for medical optimization, as well as other appropriate specialists as needed for optimization prior to spine surgery. \par

## 2020-08-20 NOTE — ADDENDUM
[FreeTextEntry1] : Documented by Emil Jo acting as a scribe for Dr. Denis Dotson on 08/20/2020. All medical record entries made by the Scribe were at my, Dr. Denis Dotson, direction and personally dictated by me on 08/20/2020 . I have reviewed the chart and agree that the record accurately reflects my personal performance of the history, physical exam, assessment and plan. I have also personally directed, reviewed, and agreed with the chart.

## 2020-09-21 ENCOUNTER — RX RENEWAL (OUTPATIENT)
Age: 75
End: 2020-09-21

## 2020-10-07 ENCOUNTER — APPOINTMENT (OUTPATIENT)
Dept: ORTHOPEDIC SURGERY | Facility: CLINIC | Age: 75
End: 2020-10-07
Payer: MEDICARE

## 2020-10-07 VITALS
HEART RATE: 68 BPM | HEIGHT: 59 IN | SYSTOLIC BLOOD PRESSURE: 124 MMHG | BODY MASS INDEX: 31.25 KG/M2 | WEIGHT: 155 LBS | DIASTOLIC BLOOD PRESSURE: 64 MMHG

## 2020-10-07 VITALS — TEMPERATURE: 97.8 F

## 2020-10-07 DIAGNOSIS — M65.342 TRIGGER FINGER, LEFT RING FINGER: ICD-10-CM

## 2020-10-07 DIAGNOSIS — M65.332 TRIGGER FINGER, LEFT MIDDLE FINGER: ICD-10-CM

## 2020-10-07 PROCEDURE — 99214 OFFICE O/P EST MOD 30 MIN: CPT

## 2020-10-07 PROCEDURE — 73130 X-RAY EXAM OF HAND: CPT | Mod: RT

## 2020-10-20 ENCOUNTER — APPOINTMENT (OUTPATIENT)
Dept: ORTHOPEDIC SURGERY | Facility: CLINIC | Age: 75
End: 2020-10-20
Payer: MEDICARE

## 2020-10-20 VITALS
HEART RATE: 62 BPM | HEIGHT: 59 IN | WEIGHT: 155 LBS | BODY MASS INDEX: 31.25 KG/M2 | SYSTOLIC BLOOD PRESSURE: 127 MMHG | DIASTOLIC BLOOD PRESSURE: 65 MMHG

## 2020-10-20 VITALS — TEMPERATURE: 97.9 F

## 2020-10-20 DIAGNOSIS — K21.9 GASTRO-ESOPHAGEAL REFLUX DISEASE W/OUT ESOPHAGITIS: ICD-10-CM

## 2020-10-20 PROCEDURE — 99214 OFFICE O/P EST MOD 30 MIN: CPT

## 2020-10-21 ENCOUNTER — NON-APPOINTMENT (OUTPATIENT)
Age: 75
End: 2020-10-21

## 2020-10-26 NOTE — HISTORY OF PRESENT ILLNESS
[de-identified] : 75 year old  F Presents in follow up to cervical and lumbar stenosis. Patient just finished PT, no UE weakness, she reports weakness in her BL LE. She notices she feels she needs to pull herself upward with the handrail while climbing the stairs. She says when she sits down her back pain is alleviated. She is noticing that the weakness in her legs she is slowly getting worse over the last 6 months. I believe this may be multi factorial due to her age , S/p knee replacement and cervical and lumbar stenosis. VALERIA questionnaire positive due to BL LE weakness. There is also a secondary complaint of an itching, burning and redness in her hands.  [Ataxia] : no ataxia [Incontinence] : no incontinence [Loss of Dexterity] : good dexterity [Urinary Ret.] : no urinary retention

## 2020-10-26 NOTE — DISCUSSION/SUMMARY
[de-identified] : I presented the patient with the option for a posterior laminectomy based on her progressive LE weakness. She wishes to think about it over the next month and return to the office. If she feels she is getting weaker I will again recommend a post cervical laminectomy, I will not present her with a lumbar laminectomy due to possible worsening of her cervical stenosis, cervical exam and worsening LE motor strength. The patient will follow up in 1 months for a repeat clinical assessment, if pain or symptoms worsen I encouraged her to schedule an appointment immediately.\par \par Patient with multiple medical comorbidity increasing the risk of perioperative and postoperative complications as well as diminished spine outcomes as per the current medical literature. These include but are not limited to obesity, anxiety/depression, cardiac illness, kidney disease, peripheral vascular disease, history of cancer, COPD, dysmetabolic syndrome including but not limited to diabetes, hyperlipidemia, hypertension. Patient is being referred back to primary care provider for medical optimization, as well as other appropriate specialists as needed for optimization prior to spine surgery. \par \par ADDENDUM 10/26/20:  I Spoke to Shabnam's daughter and they are considering possible surgery.  They will follow up to discuss as scheduled 11/20/20.  I will order CT cervical for surgical planning. \par

## 2020-10-26 NOTE — ADDENDUM
[FreeTextEntry1] : Documented by Emil Jo acting as a scribe for Dr. Denis Dotson on 10/20/2020. All medical record entries made by the Scribe were at my, Dr. Denis Dotson, direction and personally dictated by me on 10/20/2020 . I have reviewed the chart and agree that the record accurately reflects my personal performance of the history, physical exam, assessment and plan. I have also personally directed, reviewed, and agreed with the chart.

## 2020-11-07 ENCOUNTER — APPOINTMENT (OUTPATIENT)
Dept: CT IMAGING | Facility: CLINIC | Age: 75
End: 2020-11-07
Payer: MEDICARE

## 2020-11-07 ENCOUNTER — OUTPATIENT (OUTPATIENT)
Dept: OUTPATIENT SERVICES | Facility: HOSPITAL | Age: 75
LOS: 1 days | End: 2020-11-07

## 2020-11-07 ENCOUNTER — RESULT REVIEW (OUTPATIENT)
Age: 75
End: 2020-11-07

## 2020-11-07 DIAGNOSIS — Z00.8 ENCOUNTER FOR OTHER GENERAL EXAMINATION: ICD-10-CM

## 2020-11-07 DIAGNOSIS — Z98.2 PRESENCE OF CEREBROSPINAL FLUID DRAINAGE DEVICE: Chronic | ICD-10-CM

## 2020-11-07 PROCEDURE — 76376 3D RENDER W/INTRP POSTPROCES: CPT | Mod: 26

## 2020-11-07 PROCEDURE — 72125 CT NECK SPINE W/O DYE: CPT | Mod: 26

## 2020-11-09 ENCOUNTER — APPOINTMENT (OUTPATIENT)
Dept: CARDIOLOGY | Facility: CLINIC | Age: 75
End: 2020-11-09

## 2020-11-16 ENCOUNTER — APPOINTMENT (OUTPATIENT)
Dept: ENDOCRINOLOGY | Facility: CLINIC | Age: 75
End: 2020-11-16
Payer: MEDICARE

## 2020-11-16 VITALS
TEMPERATURE: 96.5 F | SYSTOLIC BLOOD PRESSURE: 110 MMHG | BODY MASS INDEX: 31.25 KG/M2 | WEIGHT: 155 LBS | HEIGHT: 59 IN | DIASTOLIC BLOOD PRESSURE: 66 MMHG

## 2020-11-16 DIAGNOSIS — M81.0 AGE-RELATED OSTEOPOROSIS W/OUT CURRENT PATHOLOGICAL FRACTURE: ICD-10-CM

## 2020-11-16 PROCEDURE — 99204 OFFICE O/P NEW MOD 45 MIN: CPT

## 2020-11-16 RX ORDER — METHYLPREDNISOLONE 4 MG/1
4 TABLET ORAL
Qty: 1 | Refills: 0 | Status: DISCONTINUED | COMMUNITY
Start: 2020-08-20 | End: 2020-11-16

## 2020-11-16 RX ORDER — VALACYCLOVIR 1 G/1
1 TABLET, FILM COATED ORAL 3 TIMES DAILY
Qty: 21 | Refills: 0 | Status: DISCONTINUED | COMMUNITY
Start: 2020-05-14 | End: 2020-11-16

## 2020-11-16 RX ORDER — ALBUTEROL SULFATE 90 UG/1
108 (90 BASE) AEROSOL, METERED RESPIRATORY (INHALATION)
Qty: 1 | Refills: 1 | Status: DISCONTINUED | COMMUNITY
Start: 2019-01-02 | End: 2020-11-16

## 2020-11-16 RX ORDER — FAMOTIDINE 20 MG/1
20 TABLET, FILM COATED ORAL DAILY
Qty: 30 | Refills: 3 | Status: DISCONTINUED | COMMUNITY
Start: 2020-05-04 | End: 2020-11-16

## 2020-11-16 NOTE — ASSESSMENT
[FreeTextEntry1] : Hypercalcemia: \par no family h/o hyperCalcemia. \par she takes HCTZ / triamterene for her hydrocephalus .HCTZ could contribute to hyperCalcemia .Treated by Dr Cabrera neurology. Might consider discussing stopping this medication if possible.\par no previous kidney stones, no depression, no other supplements \par advised to stop her MVI , she takes only 1000 u qd \par tfts normal in 2020 \par check cmp, Sloan, 25oHD, PTHi, 1.25 diOHD \par consider doing 24 hr uRIne calcium. \par check DXA now\par \par Vitamin d deficiency: continue 1000 u qd of vitamin d  \par \par Osteoporosis : with previous collar fracture by falling in the bathroom and fractured coccyx. \par she is treated with PROlia for her osteoporosis by Dr Swan her rheumatologist. \par she takes MVi\par continue vitamin D 1000 u qd. \par she's been taking Prolia for > 5 yrs she thinks. \par \par HTN :  bp at target on meds. Continue current management.\par I advised low fat/low cholesterol diet, low salt diet, and weight loss\par \par HLD: LDL at target.\par low fat/low cholesterol diet and weight loss advised\par continue statin \par \par Call for results her daughter: Radha \par Patient forgetful. \par

## 2020-11-16 NOTE — HISTORY OF PRESENT ILLNESS
[FreeTextEntry1] : quality: hypercalcemia \par onset 2018 \par severity: moderate \par modifying factors: none \par associated factors: HTN, HLD\par

## 2020-11-18 ENCOUNTER — TRANSCRIPTION ENCOUNTER (OUTPATIENT)
Age: 75
End: 2020-11-18

## 2020-11-18 ENCOUNTER — APPOINTMENT (OUTPATIENT)
Dept: FAMILY MEDICINE | Facility: CLINIC | Age: 75
End: 2020-11-18
Payer: MEDICARE

## 2020-11-18 VITALS
HEIGHT: 59 IN | BODY MASS INDEX: 31.25 KG/M2 | WEIGHT: 155 LBS | TEMPERATURE: 97.2 F | SYSTOLIC BLOOD PRESSURE: 105 MMHG | RESPIRATION RATE: 18 BRPM | DIASTOLIC BLOOD PRESSURE: 60 MMHG | HEART RATE: 70 BPM

## 2020-11-18 PROCEDURE — 99213 OFFICE O/P EST LOW 20 MIN: CPT | Mod: 25

## 2020-11-18 PROCEDURE — 36415 COLL VENOUS BLD VENIPUNCTURE: CPT

## 2020-11-18 NOTE — PHYSICAL EXAM
[Normal Sclera/Conjunctiva] : normal sclera/conjunctiva [PERRL] : pupils equal round and reactive to light [EOMI] : extraocular movements intact [Normal] : affect was normal and insight and judgment were intact

## 2020-11-19 LAB
CHOLEST SERPL-MCNC: 221 MG/DL
HDLC SERPL-MCNC: 65 MG/DL
LDLC SERPL CALC-MCNC: 122 MG/DL
NONHDLC SERPL-MCNC: 157 MG/DL
TRIGL SERPL-MCNC: 174 MG/DL

## 2020-11-20 ENCOUNTER — APPOINTMENT (OUTPATIENT)
Dept: ORTHOPEDIC SURGERY | Facility: CLINIC | Age: 75
End: 2020-11-20

## 2020-11-21 ENCOUNTER — RX RENEWAL (OUTPATIENT)
Age: 75
End: 2020-11-21

## 2020-11-23 ENCOUNTER — OUTPATIENT (OUTPATIENT)
Dept: OUTPATIENT SERVICES | Facility: HOSPITAL | Age: 75
LOS: 1 days | End: 2020-11-23
Payer: MEDICARE

## 2020-11-23 ENCOUNTER — RESULT REVIEW (OUTPATIENT)
Age: 75
End: 2020-11-23

## 2020-11-23 ENCOUNTER — APPOINTMENT (OUTPATIENT)
Dept: RADIOLOGY | Facility: CLINIC | Age: 75
End: 2020-11-23
Payer: MEDICARE

## 2020-11-23 DIAGNOSIS — E83.52 HYPERCALCEMIA: ICD-10-CM

## 2020-11-23 DIAGNOSIS — Z98.2 PRESENCE OF CEREBROSPINAL FLUID DRAINAGE DEVICE: Chronic | ICD-10-CM

## 2020-11-23 LAB
24R-OH-CALCIDIOL SERPL-MCNC: 68.2 PG/ML
25(OH)D3 SERPL-MCNC: 48.2 NG/ML
ALBUMIN SERPL ELPH-MCNC: 4.5 G/DL
ALP BLD-CCNC: 69 U/L
ALT SERPL-CCNC: 16 U/L
ANION GAP SERPL CALC-SCNC: 10 MMOL/L
AST SERPL-CCNC: 13 U/L
BILIRUB SERPL-MCNC: 0.3 MG/DL
BUN SERPL-MCNC: 23 MG/DL
CA-I SERPL-SCNC: 1.46 MMOL/L
CALCIUM SERPL-MCNC: 10.8 MG/DL
CALCIUM SERPL-MCNC: 10.8 MG/DL
CHLORIDE SERPL-SCNC: 101 MMOL/L
CO2 SERPL-SCNC: 29 MMOL/L
CREAT SERPL-MCNC: 0.95 MG/DL
GLUCOSE SERPL-MCNC: 92 MG/DL
PARATHYROID HORMONE INTACT: 81 PG/ML
PHOSPHATE SERPL-MCNC: 2.8 MG/DL
POTASSIUM SERPL-SCNC: 4.5 MMOL/L
PROT SERPL-MCNC: 6.5 G/DL
SODIUM SERPL-SCNC: 140 MMOL/L

## 2020-11-23 PROCEDURE — 77080 DXA BONE DENSITY AXIAL: CPT

## 2020-11-23 PROCEDURE — 77080 DXA BONE DENSITY AXIAL: CPT | Mod: 26

## 2020-11-25 ENCOUNTER — APPOINTMENT (OUTPATIENT)
Dept: RADIOLOGY | Facility: CLINIC | Age: 75
End: 2020-11-25

## 2020-11-27 LAB — HEMOCCULT STL QL IA: POSITIVE

## 2020-11-27 NOTE — DATA REVIEWED
[FreeTextEntry1] : I reviewed information including but not limited to previous notes and laboratories for this patient  84

## 2020-11-27 NOTE — ADDENDUM
[FreeTextEntry1] : 1:15 PM11/27/2020 Patient reports vaginal bleeding, following up with her OB/Gyn. Denies sings of GI bleed. She has an appointment with GI on march/2021. I advised her to try to reschedule it earlier for a screening colonoscopy. Last colonoscopy on 2008 reported hemorrhoids. I advised her to monitor for sings of bleeding and to go tot he emergency department if she has them. I called this patient to the number on record. Tests results discussed over the phone. All questions answered

## 2020-11-27 NOTE — HISTORY OF PRESENT ILLNESS
[FreeTextEntry1] : follow up [de-identified] : Ms. TOMI ROYAL is a 75 year old female with PMH of hydrocephalus s/p shunt following up with neuro, HTN, HLD, low vit D following up with endocrinology. Patient normally follows up with Dr Shipley. Patient comes to the office for medicla conditions follow up. No complaints today

## 2020-11-27 NOTE — PLAN
[FreeTextEntry1] : \par \par In regards to hypertension\par Patient's BP in adequate range. DIscussed avoid using salt and taking medications as indicated\par \par In regards to hyperlipidemia:\par Patient has not been taking atorvastatin since march/2020. I advised regarding the benefits to take it. \par I encouraged patient to do aerobic exercise, avoid fatty foods, to take the medications as prescribed and\par keep up follow up appointments with MD to monitor the cholesterol/triglycerides levels. WIll repeat lipid panel\par \par Regarding her hydrocephalus s/p shunt: Denies headache, dizziness, unsteady gait.  She is following up with neuro\par \par Patient following with ophthalmology for vision complaints, last time 2 weeks ago and has coming up follow up.\par \par Patient unsure if she got the flu shot recently a she got recently vaccinated. Will find out in her her records and if not she will come back to get it\par \par \par Sending FOBT and GI referral for colon cancer screening. I also advised her to follow up with OB/Gyn for screening as she is due for it\par \par Return to care: within 3 months\par Call or return for any questions

## 2020-12-09 DIAGNOSIS — E83.52 HYPERCALCEMIA: ICD-10-CM

## 2020-12-09 DIAGNOSIS — E21.3 HYPERPARATHYROIDISM, UNSPECIFIED: ICD-10-CM

## 2020-12-09 LAB
CAU: 5 MG/DL
CREAT 24H UR-MCNC: 0.8 G/24 H
CREAT 24H UR-MCNC: 0.8 G/24 H
CREAT ?TM UR-MCNC: 40 MG/DL
CREAT ?TM UR-MCNC: 40 MG/DL
PROT ?TM UR-MCNC: 24 HR
PROT ?TM UR-MCNC: 24 HR
SPECIMEN VOL 24H UR: 102 MG/24 H
SPECIMEN VOL 24H UR: 2050 ML
SPECIMEN VOL 24H UR: 2050 ML

## 2021-01-01 NOTE — ED ADULT NURSE REASSESSMENT NOTE - TEMPLATE LIST FOR HEAD TO TOE ASSESSMENT
Neuro · Hold statin therapy for now  · Check acute hepatitis panel  · Avoid all hepatotoxic agents  · Follow the liver function tests    Results from last 7 days   Lab Units 01/01/21  0625 12/31/20  1325   SODIUM mmol/L 135* 137   POTASSIUM mmol/L 4 1 3 3*   CHLORIDE mmol/L 102 101   CO2 mmol/L 23 27   BUN mg/dL 18 19   CREATININE mg/dL 1 02 1 19   CALCIUM mg/dL 8 0* 8 2*   ALK PHOS U/L 199* 208*   ALT U/L 40 46   AST U/L 57* 55*

## 2021-01-04 LAB — CA-I SERPL-SCNC: 1.45 MMOL/L

## 2021-01-07 ENCOUNTER — APPOINTMENT (OUTPATIENT)
Dept: ORTHOPEDIC SURGERY | Facility: CLINIC | Age: 76
End: 2021-01-07

## 2021-01-07 ENCOUNTER — APPOINTMENT (OUTPATIENT)
Dept: GASTROENTEROLOGY | Facility: CLINIC | Age: 76
End: 2021-01-07
Payer: MEDICARE

## 2021-01-07 VITALS
HEART RATE: 77 BPM | SYSTOLIC BLOOD PRESSURE: 124 MMHG | DIASTOLIC BLOOD PRESSURE: 73 MMHG | BODY MASS INDEX: 30.24 KG/M2 | HEIGHT: 59 IN | TEMPERATURE: 97.3 F | WEIGHT: 150 LBS

## 2021-01-07 PROCEDURE — 99213 OFFICE O/P EST LOW 20 MIN: CPT

## 2021-01-07 RX ORDER — MAGNESIUM OXIDE 241.3 MG/1000MG
400 TABLET ORAL
Refills: 0 | Status: ACTIVE | COMMUNITY

## 2021-01-07 NOTE — PHYSICAL EXAM
[General Appearance - Alert] : alert [General Appearance - In No Acute Distress] : in no acute distress [Sclera] : the sclera and conjunctiva were normal [PERRL With Normal Accommodation] : pupils were equal in size, round, and reactive to light [Extraocular Movements] : extraocular movements were intact [Neck Appearance] : the appearance of the neck was normal [Neck Cervical Mass (___cm)] : no neck mass was observed [Jugular Venous Distention Increased] : there was no jugular-venous distention [Thyroid Diffuse Enlargement] : the thyroid was not enlarged [Thyroid Nodule] : there were no palpable thyroid nodules [Auscultation Breath Sounds / Voice Sounds] : lungs were clear to auscultation bilaterally [Heart Rate And Rhythm] : heart rate was normal and rhythm regular [Heart Sounds] : normal S1 and S2 [Heart Sounds Gallop] : no gallops [Murmurs] : no murmurs [Heart Sounds Pericardial Friction Rub] : no pericardial rub [Bowel Sounds] : normal bowel sounds [Abdomen Soft] : soft [Abdomen Tenderness] : non-tender [] : no hepato-splenomegaly [Abdomen Mass (___ Cm)] : no abdominal mass palpated [Normal Sphincter Tone] : normal sphincter tone [No Rectal Mass] : no rectal mass [Occult Blood Positive] : stool was negative for occult blood

## 2021-01-07 NOTE — HISTORY OF PRESENT ILLNESS
[de-identified] : patient presented for routine colon cancer screening. Her last colonoscopy was 12 years ago. It was normal. The patient denies abdominal pain constipation diarrhea rectal bleeding nausea vomiting weight loss or family history of colorectal neoplasm.\par

## 2021-01-07 NOTE — ASSESSMENT
[FreeTextEntry1] : I discussed with the patient in the appropriate to do a colonoscopy since been 12 years since her last procedure. The indications benefits risks and alternatives were discussed with the patient. I reviewed all her blood work including her CMP and CMP which was unremarkable except for her calcium level.I reviewed the indications benefits risks and alternatives colonoscopy and the patient is medically upper for the planned procedure. I also discussed with her the procedure was normal that she would not need a followup colonoscopy for his screening purposes in the future because of her age. I also then went and talked her daughter and explained the same issues again and the daughter understood and agreed to have her mother have a colonoscopy.

## 2021-01-08 ENCOUNTER — NON-APPOINTMENT (OUTPATIENT)
Age: 76
End: 2021-01-08

## 2021-01-08 LAB
24R-OH-CALCIDIOL SERPL-MCNC: 54.7 PG/ML
25(OH)D3 SERPL-MCNC: 43.7 NG/ML
ALBUMIN SERPL ELPH-MCNC: 4.5 G/DL
ALP BLD-CCNC: 66 U/L
ALT SERPL-CCNC: 10 U/L
ANION GAP SERPL CALC-SCNC: 11 MMOL/L
AST SERPL-CCNC: 10 U/L
BASOPHILS # BLD AUTO: 0.04 K/UL
BASOPHILS NFR BLD AUTO: 0.9 %
BILIRUB SERPL-MCNC: 0.5 MG/DL
BUN SERPL-MCNC: 28 MG/DL
CALCIUM SERPL-MCNC: 11.2 MG/DL
CALCIUM SERPL-MCNC: 11.2 MG/DL
CHLORIDE SERPL-SCNC: 103 MMOL/L
CHOLEST SERPL-MCNC: 167 MG/DL
CO2 SERPL-SCNC: 26 MMOL/L
CREAT SERPL-MCNC: 1.04 MG/DL
EOSINOPHIL # BLD AUTO: 0.1 K/UL
EOSINOPHIL NFR BLD AUTO: 2.3 %
GLUCOSE SERPL-MCNC: 87 MG/DL
HCT VFR BLD CALC: 39.6 %
HDLC SERPL-MCNC: 65 MG/DL
HGB BLD-MCNC: 12.9 G/DL
IMM GRANULOCYTES NFR BLD AUTO: 0.2 %
LDLC SERPL CALC-MCNC: 83 MG/DL
LDLC SERPL DIRECT ASSAY-MCNC: 87 MG/DL
LYMPHOCYTES # BLD AUTO: 1.03 K/UL
LYMPHOCYTES NFR BLD AUTO: 23.5 %
MAGNESIUM SERPL-MCNC: 2 MG/DL
MAN DIFF?: NORMAL
MCHC RBC-ENTMCNC: 29.7 PG
MCHC RBC-ENTMCNC: 32.6 GM/DL
MCV RBC AUTO: 91.2 FL
MONOCYTES # BLD AUTO: 0.47 K/UL
MONOCYTES NFR BLD AUTO: 10.7 %
NEUTROPHILS # BLD AUTO: 2.73 K/UL
NEUTROPHILS NFR BLD AUTO: 62.4 %
NONHDLC SERPL-MCNC: 102 MG/DL
PARATHYROID HORMONE INTACT: 79 PG/ML
PHOSPHATE SERPL-MCNC: 2.7 MG/DL
PLATELET # BLD AUTO: 239 K/UL
POTASSIUM SERPL-SCNC: 4 MMOL/L
PROT SERPL-MCNC: 6.5 G/DL
RBC # BLD: 4.34 M/UL
RBC # FLD: 12.4 %
SODIUM SERPL-SCNC: 141 MMOL/L
TRIGL SERPL-MCNC: 96 MG/DL
WBC # FLD AUTO: 4.38 K/UL

## 2021-01-11 ENCOUNTER — TRANSCRIPTION ENCOUNTER (OUTPATIENT)
Age: 76
End: 2021-01-11

## 2021-01-19 ENCOUNTER — OUTPATIENT (OUTPATIENT)
Dept: OUTPATIENT SERVICES | Facility: HOSPITAL | Age: 76
LOS: 1 days | End: 2021-01-19

## 2021-01-19 ENCOUNTER — APPOINTMENT (OUTPATIENT)
Dept: ULTRASOUND IMAGING | Facility: CLINIC | Age: 76
End: 2021-01-19
Payer: MEDICARE

## 2021-01-19 DIAGNOSIS — E83.52 HYPERCALCEMIA: ICD-10-CM

## 2021-01-19 DIAGNOSIS — Z98.2 PRESENCE OF CEREBROSPINAL FLUID DRAINAGE DEVICE: Chronic | ICD-10-CM

## 2021-01-19 PROCEDURE — 76536 US EXAM OF HEAD AND NECK: CPT | Mod: 26

## 2021-02-12 ENCOUNTER — APPOINTMENT (OUTPATIENT)
Dept: DISASTER EMERGENCY | Facility: CLINIC | Age: 76
End: 2021-02-12

## 2021-02-13 LAB — SARS-COV-2 N GENE NPH QL NAA+PROBE: NOT DETECTED

## 2021-02-16 ENCOUNTER — APPOINTMENT (OUTPATIENT)
Dept: GASTROENTEROLOGY | Facility: GI CENTER | Age: 76
End: 2021-02-16
Payer: MEDICARE

## 2021-02-16 ENCOUNTER — OUTPATIENT (OUTPATIENT)
Dept: OUTPATIENT SERVICES | Facility: HOSPITAL | Age: 76
LOS: 1 days | End: 2021-02-16
Payer: MEDICARE

## 2021-02-16 DIAGNOSIS — Z12.11 ENCOUNTER FOR SCREENING FOR MALIGNANT NEOPLASM OF COLON: ICD-10-CM

## 2021-02-16 DIAGNOSIS — Z98.2 PRESENCE OF CEREBROSPINAL FLUID DRAINAGE DEVICE: Chronic | ICD-10-CM

## 2021-02-16 PROCEDURE — G0121: CPT

## 2021-02-16 PROCEDURE — G0121 COLON CA SCRN NOT HI RSK IND: CPT

## 2021-02-16 NOTE — REASON FOR VISIT
[Procedure: _________] : a [unfilled] procedure visit [FreeTextEntry1] : Colon cancer screening [Colonoscopy] : a colonoscopy [FreeTextEntry2] : Colon cancer screening

## 2021-02-16 NOTE — PROCEDURE
[With Biopsy] : without biopsy [Colon Cancer Screening] : colon cancer screening [Procedure Explained] : The procedure was explained [Allergies Reviewed] : allergies reviewed. [Risks] : Risks [Benefits] : benefits [Alternatives] : alternatives [Bleeding] : bleeding risk [Consent Obtained] : written consent was obtained prior to the procedure and is detailed in the patient's record [Patient] : the patient [Automated Blood Pressure Cuff] : automated blood pressure cuff [Cardiac Monitor] : cardiac monitor [Pulse Oximeter] : pulse oximeter [Propofol ___ mg IV] : Propofol [unfilled] ~Umg intravenously [___ L/min Oxygen via NC] : [unfilled] ~Uliters/minute oxygen via nasal cannula [Prep Qualtiy: ___] : Prep Quality:  [unfilled] [Withdrawal Time: ___] : Withdrawal Time:  [unfilled] [Cecum (Landmarks/Transillum)] : and guided to the cecum which was identified by the anatomic landmarks of the appendiceal orifice and ileocecal valve and by transillumination in the right lower quadrant [Normal] : Normal [Tolerated Well] : the patient tolerated the procedure well

## 2021-02-19 ENCOUNTER — APPOINTMENT (OUTPATIENT)
Dept: NUCLEAR MEDICINE | Facility: CLINIC | Age: 76
End: 2021-02-19

## 2021-02-19 ENCOUNTER — TRANSCRIPTION ENCOUNTER (OUTPATIENT)
Age: 76
End: 2021-02-19

## 2021-02-22 ENCOUNTER — RX RENEWAL (OUTPATIENT)
Age: 76
End: 2021-02-22

## 2021-03-01 ENCOUNTER — APPOINTMENT (OUTPATIENT)
Dept: FAMILY MEDICINE | Facility: CLINIC | Age: 76
End: 2021-03-01
Payer: MEDICARE

## 2021-03-01 VITALS
DIASTOLIC BLOOD PRESSURE: 70 MMHG | HEART RATE: 60 BPM | HEIGHT: 59 IN | TEMPERATURE: 97.2 F | SYSTOLIC BLOOD PRESSURE: 130 MMHG | BODY MASS INDEX: 30.64 KG/M2 | WEIGHT: 152 LBS

## 2021-03-01 DIAGNOSIS — Z01.818 ENCOUNTER FOR OTHER PREPROCEDURAL EXAMINATION: ICD-10-CM

## 2021-03-01 PROCEDURE — 99214 OFFICE O/P EST MOD 30 MIN: CPT

## 2021-03-01 RX ORDER — TRIAMTERENE AND HYDROCHLOROTHIAZIDE 37.5; 25 MG/1; MG/1
37.5-25 CAPSULE ORAL
Qty: 90 | Refills: 0 | Status: DISCONTINUED | COMMUNITY
Start: 2019-06-16 | End: 2021-03-01

## 2021-03-02 PROBLEM — Z01.818 PREOPERATIVE CLEARANCE: Status: RESOLVED | Noted: 2019-03-07 | Resolved: 2021-03-02

## 2021-03-02 NOTE — PHYSICAL EXAM
[No Acute Distress] : no acute distress [Well Nourished] : well nourished [Well-Appearing] : well-appearing [Normal Sclera/Conjunctiva] : normal sclera/conjunctiva [PERRL] : pupils equal round and reactive to light [Normal Outer Ear/Nose] : the outer ears and nose were normal in appearance [Normal Oropharynx] : the oropharynx was normal [Normal TMs] : both tympanic membranes were normal [No Lymphadenopathy] : no lymphadenopathy [Supple] : supple [No Respiratory Distress] : no respiratory distress  [No Accessory Muscle Use] : no accessory muscle use [Clear to Auscultation] : lungs were clear to auscultation bilaterally [Normal Rate] : normal rate  [Regular Rhythm] : with a regular rhythm [Normal S1, S2] : normal S1 and S2 [Soft] : abdomen soft [Non Tender] : non-tender [Normal Bowel Sounds] : normal bowel sounds [No Joint Swelling] : no joint swelling [No Rash] : no rash [Coordination Grossly Intact] : coordination grossly intact [No Focal Deficits] : no focal deficits [Deep Tendon Reflexes (DTR)] : deep tendon reflexes were 2+ and symmetric [Speech Grossly Normal] : speech grossly normal [Memory Grossly Normal] : memory grossly normal [Normal Mood] : the mood was normal

## 2021-03-03 NOTE — HISTORY OF PRESENT ILLNESS
[No Pertinent Pulmonary History] : no history of asthma, COPD, sleep apnea, or smoking [No Adverse Anesthesia Reaction] : no adverse anesthesia reaction in self or family member [(Patient denies any chest pain, claudication, dyspnea on exertion, orthopnea, palpitations or syncope)] : Patient denies any chest pain, claudication, dyspnea on exertion, orthopnea, palpitations or syncope [Aortic Stenosis] : no aortic stenosis [Atrial Fibrillation] : no atrial fibrillation [Coronary Artery Disease] : no coronary artery disease [Recent Myocardial Infarction] : no recent myocardial infarction [Chronic Anticoagulation] : no chronic anticoagulation [Chronic Kidney Disease] : no chronic kidney disease [Diabetes] : no diabetes [FreeTextEntry1] : "YOLANDA AND PINS" TO NECK [FreeTextEntry2] : THURSDAY, MARCH 4, 2021 [FreeTextEntry3] : DR. GARRETT BARBOSA [FreeTextEntry4] : PRESENTING FOR MEDICAL CLEARANCE FOR UPCOMING NECK SURGERY.  IS UNSURE WHAT PROCEDURE SHE IS HAVING DONE.  IT IS FOR SPINAL STENOSIS OF C-SPINE.  HAS CAUSED HER PAIN.  IS FEELING WELL TODAY.  CHRONIC HISTORY OF HYPERTENSION, HYPERLIPIDEMIA AND VITAMIN D INSUFFICIENCY.  ALSO HISTORY OF NORMAL PRESSURE HYDROCEPHALUS S/P  SHUNT.  SHE HAS NO HISTORY OF MI, STROKE OR SEIZURE.  NO PERSONAL OR FAMILY HISTORY OF BLOOD OR CLOTTING DISORDERS.  DENIES EASY BLEEDING OR BRUISING.  SAW ENDOCRINOLOGY FOR EVALUATION OF ELEVATED CALCIUM.  DISCUSSED PARATHYROID SCAN.  SAW RHEUMATOLOGY WHO HELD DYAZIDE AND REPEATED LAB WORK.  WAS ADVISED LEVELS IMPROVED AND MAY NOT NEED PARATHYROID SCAN.  SHE WILL FOLLOW-UP POST PROCEDURE.  HAS HAD NO HEADACHE, DIZZINESS, CHEST PAIN, SHORTNESS OF BREATH.  NO GI COMPLAINTS.  HAD COLONOSCOPY LAST WEEK. [FreeTextEntry5] :  SHUNT [FreeTextEntry7] : STRESS TEST MAY 2019\par STATES ECHO 10/20\par \par CARDIOLOGY NOW: DR. HOGAN; HAD ECHOCARDIOGRAM 10/20

## 2021-03-03 NOTE — PLAN
[FreeTextEntry1] : OPTIMIZED MEDICALLY FOR PROPOSED SURGICAL PROCEDURE PENDING PRE-OPERATIVE TESTING REVIEW AND CONSULTANT NOTE TO DETERMINE PROCEDURE BEING PERFORMED AND RECENT CARDIOLOGY EVALUATION\par GI/DVT PROPHYLAXIS PER SURGERY\par AVOIDANCE OF NSAIDS, VITAMINS AND ASPIRIN CONTAINING PRODUCTS PRIOR TO SURGERY\par STATES SHE WAS ADVISED TO NOT TAKE IRBESARTAN MORNING OF PROCEDURE BUT TO BRING TO THE HOSPITAL\par BLOOD PRESSURE STABLE OFF WATER PILL; TO DISCUSS WITH NEUROSURGERY AS WELL\par CALL WITH ANY QUESTIONS, CONCERNS OR CHANGES PRIOR TO PROCEDURE\par FOLLOW-UP POST-OPERATIVELY\par ENDOCRINOLOGY CONSULTANT NOTE APPRECIATED\par NEED RHEUMATOLOGY CONSULTANT NOTE\par \par ADDENDUM: CLEARED BY CARDIOLOGY; PRE-OP TESTING REVIEWED; URINE CULTURE RESULTS NOT AVAILABLE FOR REVIEW.  MAY PROCEED WITH PLANNED C-SPINE PROCEDURE IF URINE CULTURE NEGATIVE

## 2021-03-15 ENCOUNTER — APPOINTMENT (OUTPATIENT)
Dept: ENDOCRINOLOGY | Facility: CLINIC | Age: 76
End: 2021-03-15

## 2021-03-25 ENCOUNTER — APPOINTMENT (OUTPATIENT)
Dept: FAMILY MEDICINE | Facility: CLINIC | Age: 76
End: 2021-03-25
Payer: MEDICARE

## 2021-03-25 VITALS
HEART RATE: 85 BPM | HEIGHT: 59 IN | DIASTOLIC BLOOD PRESSURE: 70 MMHG | WEIGHT: 150 LBS | SYSTOLIC BLOOD PRESSURE: 126 MMHG | BODY MASS INDEX: 30.24 KG/M2

## 2021-03-25 DIAGNOSIS — Z12.39 ENCOUNTER FOR OTHER SCREENING FOR MALIGNANT NEOPLASM OF BREAST: ICD-10-CM

## 2021-03-25 DIAGNOSIS — E21.0 PRIMARY HYPERPARATHYROIDISM: ICD-10-CM

## 2021-03-25 DIAGNOSIS — M48.02 SPINAL STENOSIS, CERVICAL REGION: ICD-10-CM

## 2021-03-25 PROCEDURE — 99214 OFFICE O/P EST MOD 30 MIN: CPT

## 2021-03-25 RX ORDER — DICLOFENAC SODIUM 10 MG/G
1 GEL TOPICAL
Qty: 1 | Refills: 2 | Status: DISCONTINUED | COMMUNITY
Start: 2020-10-07 | End: 2021-03-25

## 2021-03-25 RX ORDER — MULTIVIT WITH CALCIUM,IRON,MIN 27MG-0.4MG
TABLET ORAL
Refills: 0 | Status: DISCONTINUED | COMMUNITY
End: 2021-03-25

## 2021-03-25 RX ORDER — MULTIVITAMIN WITH IRON
50 TABLET ORAL
Refills: 0 | Status: ACTIVE | COMMUNITY

## 2021-03-25 RX ORDER — SODIUM SULFATE, POTASSIUM SULFATE, MAGNESIUM SULFATE 17.5; 3.13; 1.6 G/ML; G/ML; G/ML
17.5-3.13-1.6 SOLUTION, CONCENTRATE ORAL
Qty: 1 | Refills: 0 | Status: DISCONTINUED | COMMUNITY
Start: 2021-01-07 | End: 2021-03-25

## 2021-03-30 ENCOUNTER — APPOINTMENT (OUTPATIENT)
Dept: DISASTER EMERGENCY | Facility: OTHER | Age: 76
End: 2021-03-30
Payer: MEDICARE

## 2021-03-30 PROBLEM — Z12.39 BREAST SCREENING: Status: ACTIVE | Noted: 2020-01-28

## 2021-03-30 PROBLEM — E21.0 PRIMARY HYPERPARATHYROIDISM: Status: ACTIVE | Noted: 2020-12-09

## 2021-03-30 PROBLEM — M48.02 CERVICAL STENOSIS OF SPINE: Status: ACTIVE | Noted: 2020-10-20

## 2021-03-30 PROCEDURE — 0011A: CPT

## 2021-03-30 NOTE — HISTORY OF PRESENT ILLNESS
[FreeTextEntry1] : F/U FROM REHAB [de-identified] : MS. ROYAL IS A PLEASANT 76 YO PRESENTING FOR FOLLOW-UP WITH [facility]  SHE HAS A CHRONIC HISTORY OF HYPERTENSION, HYPERLIPIDEMIA, ANXIETY, OSTEOARTHRITIS, GERD, NORMAL PRESSURE HYDROCEPHALUS WITH HISTORY OF  SHUNT PLACEMENT.  SHE IS S/P C3 - C4 DECOMPRESSIVE LAMINECTOMIES AND LATERAL MASS FUSION.  SURGERY WENT WELL AND WAS DISCHARGED TO ACUTE INPATIENT REHAB FOR PHYSICAL AND OCCUPATIONAL THERAPY.  WAS DISCHARGED TO HOME ON SATURDAY.  IS TO GET PHYSICAL THERAPY AT HOME TWICE A WEEK AS WELL AS VISITING NURSE SERVICES.  RHEUMATOLOGY STOPPED CALCIUM.  HAS A NURSES AIDE TO HELP BATHE HER TWICE A WEEK.  HAS HAD NO FALLS.  APPETITE IS GOOD.  NOTES SHE HAS BEEN ITCHY ON DIFFERENT AREAS OF HER BODY WITHOUT A RASH AT DIFFERENT TIMES STARTED BEFORE SURGERY - TWO WEEKS PRIOR.  BLOOD PRESSURE CONTROLLED ON IRBESARTAN AND IS ON LIPITOR FOR CHOLESTEROL.  FEELING WELL ON CURRENT DOSAGE OF ZOLOFT FOR MILD DEPRESSION.  SEES ENDOCRINOLOGY FOR HYPERPARATHYROIDISM. HOLDING CALCIUM SUPPLEMENTS.

## 2021-03-30 NOTE — PLAN
[FreeTextEntry1] : DISCHARGE SUMMARY REVIEWED\par MEDICATIONS REVIEWED AND MEDICATION LIST UPDATED\par MONITOR BLOOD PRESSURE; CONTINUE IRBESARTAN\par CHECK ROUTINE LAB WORK, PRIOR REVIEWED\par FOLLOW-UP ALL SPECIALISTS AS DIRECTED INCLUDING ORTHOPEDICS AND CARDIOLOGY\par CONTINUE PHYSICAL THERAPY\par FALL PRECAUTIONS\par HEALTHY DIET AND LIFESTYLE MODIFICATIONS\par FEELING WELL ON CURRENT DOSAGE OF ZOLOFT - WILL CONTINUE\par PRIOR LAB WORK REVIEWED AS WELL AS MAMMOGRAM AND LAST NOTE\par NEEDS NEXT MAMMOGRAM\par WORK-UP IN PROGRESS WITH ENDOCRINOLOGY FOR HYPERPARATHYROIDISM\par CALL WITH ANY QUESTIONS, CONCERNS OR CHANGES

## 2021-03-30 NOTE — PHYSICAL EXAM
[No Acute Distress] : no acute distress [Well Nourished] : well nourished [Well-Appearing] : well-appearing [PERRL] : pupils equal round and reactive to light [No Lymphadenopathy] : no lymphadenopathy [Supple] : supple [No Respiratory Distress] : no respiratory distress  [No Accessory Muscle Use] : no accessory muscle use [Clear to Auscultation] : lungs were clear to auscultation bilaterally [Normal Rate] : normal rate  [Regular Rhythm] : with a regular rhythm [Normal S1, S2] : normal S1 and S2 [Soft] : abdomen soft [Non Tender] : non-tender [Normal Bowel Sounds] : normal bowel sounds [de-identified] : INSITIION C/D/I

## 2021-04-05 LAB
BASOPHILS # BLD AUTO: 0.03 K/UL
BASOPHILS NFR BLD AUTO: 0.8 %
EOSINOPHIL # BLD AUTO: 0.12 K/UL
EOSINOPHIL NFR BLD AUTO: 3.1 %
HCT VFR BLD CALC: 35.7 %
HGB BLD-MCNC: 11 G/DL
IMM GRANULOCYTES NFR BLD AUTO: 0.3 %
LYMPHOCYTES # BLD AUTO: 0.88 K/UL
LYMPHOCYTES NFR BLD AUTO: 22.9 %
MAGNESIUM SERPL-MCNC: 2 MG/DL
MAN DIFF?: NORMAL
MCHC RBC-ENTMCNC: 28.9 PG
MCHC RBC-ENTMCNC: 30.8 GM/DL
MCV RBC AUTO: 93.7 FL
MONOCYTES # BLD AUTO: 0.43 K/UL
MONOCYTES NFR BLD AUTO: 11.2 %
NEUTROPHILS # BLD AUTO: 2.38 K/UL
NEUTROPHILS NFR BLD AUTO: 61.7 %
PLATELET # BLD AUTO: 220 K/UL
RBC # BLD: 3.81 M/UL
RBC # FLD: 13.1 %
WBC # FLD AUTO: 3.85 K/UL

## 2021-04-13 ENCOUNTER — APPOINTMENT (OUTPATIENT)
Dept: OBGYN | Facility: CLINIC | Age: 76
End: 2021-04-13
Payer: MEDICARE

## 2021-04-13 VITALS
WEIGHT: 149 LBS | TEMPERATURE: 96.4 F | BODY MASS INDEX: 29.25 KG/M2 | HEIGHT: 60 IN | SYSTOLIC BLOOD PRESSURE: 124 MMHG | DIASTOLIC BLOOD PRESSURE: 72 MMHG

## 2021-04-13 LAB
ALBUMIN SERPL ELPH-MCNC: 4.1 G/DL
ALP BLD-CCNC: 86 U/L
ALT SERPL-CCNC: 14 U/L
ANION GAP SERPL CALC-SCNC: 13 MMOL/L
AST SERPL-CCNC: 10 U/L
BILIRUB SERPL-MCNC: 0.4 MG/DL
BILIRUB UR QL STRIP: NORMAL
BUN SERPL-MCNC: 22 MG/DL
CALCIUM SERPL-MCNC: 10.2 MG/DL
CHLORIDE SERPL-SCNC: 104 MMOL/L
CLARITY UR: CLEAR
CO2 SERPL-SCNC: 24 MMOL/L
COLLECTION METHOD: NORMAL
CREAT SERPL-MCNC: 0.78 MG/DL
GLUCOSE SERPL-MCNC: 68 MG/DL
GLUCOSE UR-MCNC: NORMAL
HCG UR QL: 0.2 EU/DL
HGB UR QL STRIP.AUTO: NORMAL
KETONES UR-MCNC: NORMAL
LEUKOCYTE ESTERASE UR QL STRIP: NORMAL
NITRITE UR QL STRIP: NORMAL
PH UR STRIP: 7
POTASSIUM SERPL-SCNC: 5.3 MMOL/L
PROT SERPL-MCNC: 6.2 G/DL
PROT UR STRIP-MCNC: NORMAL
SODIUM SERPL-SCNC: 141 MMOL/L
SP GR UR STRIP: 1.02

## 2021-04-13 PROCEDURE — 81003 URINALYSIS AUTO W/O SCOPE: CPT | Mod: QW

## 2021-04-13 PROCEDURE — 99202 OFFICE O/P NEW SF 15 MIN: CPT

## 2021-04-13 NOTE — DISCUSSION/SUMMARY
[FreeTextEntry1] : \par Affirm obtained; will follow up with results and tx prn.\par \par Discussed cloudy urine seen in office and UA dipstick read trace of blood and leukocytes which can indicate a UTI. Will be sending urine for UA and urine culture. We reviewed that we will follow up with results and send treatment prn. She denies any known drug allergies and hx of kidney disease. \par Advised to increase hydration.\par \par If her symptoms persist or worsen after treatment, she is to call office to be evaluated.\par \par Advised to try coconut oil or olive oil for vaginal dryness prn.\par \par Patient appreciative, concerns addressed and questions answered. Patient verbalized understanding and is happy with plan of care. \par

## 2021-04-13 NOTE — HISTORY OF PRESENT ILLNESS
[LMP unknown] : LMP unknown [N] : Patient is not sexually active [Y] : Positive pregnancy history [unknown] : Patient is unsure of the date of her LMP [Menarche Age: ____] : age at menarche was [unfilled] [Previously active] : previously active [Men] : men [FreeTextEntry1] : \par Shabnam presents today for an urgent visit accompanied by her friend Alessandra who she sees as a daughter. \par \par Patient recently had neck surgery and came home 2 weeks ago. \par \par States she has been experiencing intermittent vaginal itching x1 week and noticed intermittent blood upon wiping x1 week as well. Denies UTI sx.\par \par  [Mammogramdate] : 2020 [TextBox_19] : as per pt  [PapSmeardate] : 2019 [TextBox_31] : as per pt  [BoneDensityDate] : 2021 [TextBox_37] : as per pt -- osteopenia [ColonoscopyDate] : 2021 [TextBox_43] : as per pt  [PGHxTotal] : 2 [[facility]] : 2 [[facility]] : 2

## 2021-04-13 NOTE — PHYSICAL EXAM
[Appropriately responsive] : appropriately responsive [Alert] : alert [No Acute Distress] : no acute distress [Soft] : soft [Non-tender] : non-tender [Non-distended] : non-distended [Oriented x3] : oriented x3 [Labia Majora Erythema] : erythema [Labia Minora Erythema] : erythema [Normal] : normal [No Bleeding] : There was no active vaginal bleeding

## 2021-04-14 LAB
APPEARANCE: CLEAR
BACTERIA: NEGATIVE
BILIRUBIN URINE: NEGATIVE
BLOOD URINE: NEGATIVE
COLOR: YELLOW
GLUCOSE QUALITATIVE U: NEGATIVE
HYALINE CASTS: 1 /LPF
KETONES URINE: NEGATIVE
LEUKOCYTE ESTERASE URINE: NEGATIVE
MICROSCOPIC-UA: NORMAL
NITRITE URINE: NEGATIVE
PH URINE: 7
PROTEIN URINE: NORMAL
RED BLOOD CELLS URINE: 2 /HPF
SPECIFIC GRAVITY URINE: 1.02
SQUAMOUS EPITHELIAL CELLS: 6 /HPF
UROBILINOGEN URINE: NORMAL
WHITE BLOOD CELLS URINE: 2 /HPF

## 2021-04-16 LAB
BACTERIA UR CULT: ABNORMAL
CANDIDA VAG CYTO: NOT DETECTED
G VAGINALIS+PREV SP MTYP VAG QL MICRO: NOT DETECTED
T VAGINALIS VAG QL WET PREP: NOT DETECTED

## 2021-04-27 ENCOUNTER — APPOINTMENT (OUTPATIENT)
Dept: NEUROLOGY | Facility: CLINIC | Age: 76
End: 2021-04-27
Payer: MEDICARE

## 2021-04-27 ENCOUNTER — APPOINTMENT (OUTPATIENT)
Dept: DISASTER EMERGENCY | Facility: OTHER | Age: 76
End: 2021-04-27
Payer: MEDICARE

## 2021-04-27 ENCOUNTER — APPOINTMENT (OUTPATIENT)
Dept: RADIOLOGY | Facility: CLINIC | Age: 76
End: 2021-04-27
Payer: MEDICARE

## 2021-04-27 ENCOUNTER — OUTPATIENT (OUTPATIENT)
Dept: OUTPATIENT SERVICES | Facility: HOSPITAL | Age: 76
LOS: 1 days | End: 2021-04-27
Payer: MEDICARE

## 2021-04-27 VITALS
WEIGHT: 150 LBS | DIASTOLIC BLOOD PRESSURE: 70 MMHG | SYSTOLIC BLOOD PRESSURE: 126 MMHG | HEIGHT: 60 IN | TEMPERATURE: 97.6 F | BODY MASS INDEX: 29.45 KG/M2

## 2021-04-27 DIAGNOSIS — M47.816 SPONDYLOSIS WITHOUT MYELOPATHY OR RADICULOPATHY, LUMBAR REGION: ICD-10-CM

## 2021-04-27 DIAGNOSIS — Z98.2 PRESENCE OF CEREBROSPINAL FLUID DRAINAGE DEVICE: Chronic | ICD-10-CM

## 2021-04-27 PROCEDURE — 99213 OFFICE O/P EST LOW 20 MIN: CPT

## 2021-04-27 PROCEDURE — 0012A: CPT

## 2021-04-27 PROCEDURE — 72040 X-RAY EXAM NECK SPINE 2-3 VW: CPT

## 2021-04-27 PROCEDURE — 72040 X-RAY EXAM NECK SPINE 2-3 VW: CPT | Mod: 26

## 2021-04-27 RX ORDER — ACETAMINOPHEN 325 MG/1
325 TABLET ORAL
Refills: 0 | Status: DISCONTINUED | COMMUNITY
End: 2021-04-27

## 2021-04-27 RX ORDER — CEPHALEXIN 500 MG/1
500 TABLET ORAL TWICE DAILY
Qty: 14 | Refills: 0 | Status: DISCONTINUED | COMMUNITY
Start: 2021-04-16 | End: 2021-04-27

## 2021-04-27 RX ORDER — TRAMADOL HYDROCHLORIDE 50 MG/1
50 TABLET, COATED ORAL 4 TIMES DAILY
Qty: 120 | Refills: 0 | Status: DISCONTINUED | COMMUNITY
End: 2021-04-27

## 2021-04-27 NOTE — HISTORY OF PRESENT ILLNESS
[FreeTextEntry1] : Followup April 6, 2018:\par This is a 72-year-old woman who returns for followup of normal pressure hydrocephalus. She also has anxiety disorder. Regarding her normal pressure hydrocephalus she is stable. She currently sees her neurosurgeon on every two-year basis. Her last visit was this past summer and everything was well. She denies any symptoms of memory loss, incontinence of urine, or gait disorder. Regarding her anxiety she states that last fall she stopped taking her medication and several weeks later she ended up in the house with an anxiety attack. Her anxiety appears to be stable as long as she is on medication i.e. Zoloft. It worsens when she stopped taking her medication. She is also had what sounds like orthostasis when taking care of Zosyn in the morning along with her other blood pressure medications. She is here today for neurologic followup and treatment\par \par Followup April 22, 2019:\par This is a 73-year-old woman presents today for followup of normal pressure except with. She follows with Dr. Deborah Kenney neurosurgery on an every other year basis. She is scheduled to see him this year. She's had issues with her eyes but no issues relating to the normal pressure hydrocephalus. She has a limp which is due to her right knee and is scheduled to see an orthopedist in a week or 2 for followup. She does not have urinary incontinence but does have urinary urgency. She does not have significant chronic memory issues and she does not have any other gait difficulty such as a magnetic gait other than can be attributed to her right knee. She is here today for neurologic followup.\par \par Followup April 27, 2021:\par This is a 75-year-old woman presents A. for neurologic followup of normal pressure hydrocephalus. She was last seen about 2 years ago but contacted one year ago during the COVID outbreak. She is largely unchanged. She still has some issues of anxiety and is showing some features of obsessive compulsive disorder per her daughter. She did have to a surgery for cervical spinal stenosis. She posterior laminectomy with fusion. She is following up with the neurosurgeon at [facility] for this as well as her  shunt. She is here today for neurologic followup.

## 2021-04-27 NOTE — PHYSICAL EXAM

## 2021-04-27 NOTE — CONSULT LETTER
[Dear  ___] : Dear  [unfilled], [Courtesy Letter:] : I had the pleasure of seeing your patient, [unfilled], in my office today. [Please see my note below.] : Please see my note below. [Consult Closing:] : Thank you very much for allowing me to participate in the care of this patient.  If you have any questions, please do not hesitate to contact me. [Sincerely,] : Sincerely, [FreeTextEntry3] : Evans Milligan M.D., Ph.D. DPN-N\par [facility]\par Neurology at [place]\par Medical Director of Stroke Services\par [facility]\par

## 2021-05-17 ENCOUNTER — RESULT REVIEW (OUTPATIENT)
Age: 76
End: 2021-05-17

## 2021-05-17 ENCOUNTER — OUTPATIENT (OUTPATIENT)
Dept: OUTPATIENT SERVICES | Facility: HOSPITAL | Age: 76
LOS: 1 days | End: 2021-05-17
Payer: MEDICARE

## 2021-05-17 ENCOUNTER — APPOINTMENT (OUTPATIENT)
Dept: MAMMOGRAPHY | Facility: CLINIC | Age: 76
End: 2021-05-17
Payer: MEDICARE

## 2021-05-17 DIAGNOSIS — Z98.2 PRESENCE OF CEREBROSPINAL FLUID DRAINAGE DEVICE: Chronic | ICD-10-CM

## 2021-05-17 DIAGNOSIS — Z12.39 ENCOUNTER FOR OTHER SCREENING FOR MALIGNANT NEOPLASM OF BREAST: ICD-10-CM

## 2021-05-17 PROCEDURE — 77067 SCR MAMMO BI INCL CAD: CPT | Mod: 26

## 2021-05-17 PROCEDURE — 77067 SCR MAMMO BI INCL CAD: CPT

## 2021-05-17 PROCEDURE — 77063 BREAST TOMOSYNTHESIS BI: CPT | Mod: 26

## 2021-05-17 PROCEDURE — 77063 BREAST TOMOSYNTHESIS BI: CPT

## 2021-06-22 ENCOUNTER — APPOINTMENT (OUTPATIENT)
Dept: FAMILY MEDICINE | Facility: CLINIC | Age: 76
End: 2021-06-22
Payer: MEDICARE

## 2021-06-22 VITALS
BODY MASS INDEX: 28.86 KG/M2 | DIASTOLIC BLOOD PRESSURE: 82 MMHG | SYSTOLIC BLOOD PRESSURE: 130 MMHG | WEIGHT: 147 LBS | HEIGHT: 60 IN | HEART RATE: 56 BPM | TEMPERATURE: 97.2 F

## 2021-06-22 PROCEDURE — 99213 OFFICE O/P EST LOW 20 MIN: CPT | Mod: 25

## 2021-06-22 PROCEDURE — 36415 COLL VENOUS BLD VENIPUNCTURE: CPT

## 2021-06-22 RX ORDER — MULTIVIT-MIN/IRON/FOLIC ACID/K 18-600-40
CAPSULE ORAL
Refills: 0 | Status: DISCONTINUED | COMMUNITY
End: 2021-06-22

## 2021-06-22 NOTE — HISTORY OF PRESENT ILLNESS
[FreeTextEntry1] : F/U HYPERTENSION [de-identified] : MS. ROYAL IS A PLEASANT 74 YO PRESENTING FOR FOLLOW-UP. SHE HAS A CHRONIC HISTORY OF HYPERTENSION, HYPERLIPIDEMIA, ANXIETY, OSTEOARTHRITIS, GERD, NORMAL PRESSURE HYDROCEPHALUS WITH HISTORY OF  SHUNT PLACEMENT. SHE IS S/P C3 - C4 DECOMPRESSIVE LAMINECTOMIES.  RECENT FOLLOW-UP WITH HER NEUROSURGEON, NEUROLOGY, EVALUATION WITH GYN AND WILL BE SEEING OPHTHALMOLOGY IN JULY.  HAS REGAINED SOME MOVEMENT BACK IN NECK.  BLOOD PRESSURE CONTROLLED ON IRBESARTAN.  ON LIPITOR FOR CHOLESTEROL.  IS NOT TAKING VITAMIN D SUPPLEMENTS FOR VITAMIN D INSUFFICIENCY.  STATS SHE HAS NO ANXIETY AND NOT FEELING DEPRESSED.  FEELS WELL ON ZOLOFT AT THIS DOSAGE.

## 2021-06-22 NOTE — PHYSICAL EXAM
[No Acute Distress] : no acute distress [Well Nourished] : well nourished [Well-Appearing] : well-appearing [Normal Sclera/Conjunctiva] : normal sclera/conjunctiva [PERRL] : pupils equal round and reactive to light [No Respiratory Distress] : no respiratory distress  [No Accessory Muscle Use] : no accessory muscle use [Clear to Auscultation] : lungs were clear to auscultation bilaterally [Normal Rate] : normal rate  [Regular Rhythm] : with a regular rhythm [Normal S1, S2] : normal S1 and S2 [No Joint Swelling] : no joint swelling [Grossly Normal Strength/Tone] : grossly normal strength/tone [Speech Grossly Normal] : speech grossly normal [Normal Affect] : the affect was normal [Normal Mood] : the mood was normal

## 2021-06-22 NOTE — PLAN
[FreeTextEntry1] : NEUROLOGY AND GYN CONSULTANT NOTES APPRECIATED\par PRIOR LAB WORK REVIEWED\par WILL MONITOR LIPIDS\par BLOOD PRESSURE CONTROLLED; CONTINUE CURRENT MEDICATIONS\par FEELING WELL ON CURRENT DOSAGE OF ZOLOFT 25 MG DAILY - TO CONTINUE\par FOLLOW-UP ALL SPECIALISTS AS DIRECTED \par CALL WITH ANY QUESTIONS, CONCERNS OR CHANGES \par

## 2021-06-23 LAB
ALBUMIN SERPL ELPH-MCNC: 4.3 G/DL
ALP BLD-CCNC: 78 U/L
ALT SERPL-CCNC: 11 U/L
ANION GAP SERPL CALC-SCNC: 10 MMOL/L
AST SERPL-CCNC: 12 U/L
BASOPHILS # BLD AUTO: 0.03 K/UL
BASOPHILS NFR BLD AUTO: 0.7 %
BILIRUB SERPL-MCNC: 0.4 MG/DL
BUN SERPL-MCNC: 19 MG/DL
CALCIUM SERPL-MCNC: 10.4 MG/DL
CHLORIDE SERPL-SCNC: 106 MMOL/L
CO2 SERPL-SCNC: 26 MMOL/L
CREAT SERPL-MCNC: 0.78 MG/DL
EOSINOPHIL # BLD AUTO: 0.08 K/UL
EOSINOPHIL NFR BLD AUTO: 1.8 %
GLUCOSE SERPL-MCNC: 78 MG/DL
HCT VFR BLD CALC: 40.7 %
HGB BLD-MCNC: 12.6 G/DL
IMM GRANULOCYTES NFR BLD AUTO: 0.7 %
LYMPHOCYTES # BLD AUTO: 0.99 K/UL
LYMPHOCYTES NFR BLD AUTO: 22.2 %
MAN DIFF?: NORMAL
MCHC RBC-ENTMCNC: 28.2 PG
MCHC RBC-ENTMCNC: 31 GM/DL
MCV RBC AUTO: 91.1 FL
MONOCYTES # BLD AUTO: 0.47 K/UL
MONOCYTES NFR BLD AUTO: 10.5 %
NEUTROPHILS # BLD AUTO: 2.86 K/UL
NEUTROPHILS NFR BLD AUTO: 64.1 %
PLATELET # BLD AUTO: 252 K/UL
POTASSIUM SERPL-SCNC: 4.3 MMOL/L
PROT SERPL-MCNC: 6.4 G/DL
RBC # BLD: 4.47 M/UL
RBC # FLD: 12.9 %
SODIUM SERPL-SCNC: 142 MMOL/L
WBC # FLD AUTO: 4.46 K/UL

## 2021-06-25 LAB — 25(OH)D3 SERPL-MCNC: 29 NG/ML

## 2021-09-23 ENCOUNTER — APPOINTMENT (OUTPATIENT)
Dept: FAMILY MEDICINE | Facility: CLINIC | Age: 76
End: 2021-09-23
Payer: MEDICARE

## 2021-09-23 VITALS
SYSTOLIC BLOOD PRESSURE: 122 MMHG | WEIGHT: 152 LBS | BODY MASS INDEX: 29.84 KG/M2 | DIASTOLIC BLOOD PRESSURE: 70 MMHG | HEIGHT: 60 IN | HEART RATE: 78 BPM

## 2021-09-23 PROCEDURE — 90662 IIV NO PRSV INCREASED AG IM: CPT

## 2021-09-23 PROCEDURE — G0008: CPT

## 2021-09-23 PROCEDURE — 99213 OFFICE O/P EST LOW 20 MIN: CPT | Mod: 25

## 2021-09-26 NOTE — PHYSICAL EXAM
[No Acute Distress] : no acute distress [Well Nourished] : well nourished [Well-Appearing] : well-appearing [Normal Sclera/Conjunctiva] : normal sclera/conjunctiva [PERRL] : pupils equal round and reactive to light [No Respiratory Distress] : no respiratory distress  [No Accessory Muscle Use] : no accessory muscle use [Clear to Auscultation] : lungs were clear to auscultation bilaterally [Normal Rate] : normal rate  [Regular Rhythm] : with a regular rhythm [Normal S1, S2] : normal S1 and S2 [Speech Grossly Normal] : speech grossly normal [Memory Grossly Normal] : memory grossly normal [Normal Mood] : the mood was normal

## 2021-09-26 NOTE — PLAN
[FreeTextEntry1] : FLU VACCINE GIVEN AND TOLERATED WELL \par HEALTHY DIET AND LIFESTYLE MODIFICATIONS\par MONITOR LAB WORK\par BLOOD PRESSURE CONTROLLED\par FEELING WELL ON ZOLOFT - WILL CONTINUE\par STRESS REDUCTION EXERCISES\par FOLLOW-UP ALL SPECIALISTS AS DIRECTED \par CALL WITH ANY QUESTIONS, CONCERNS OR CHANGES

## 2021-09-26 NOTE — REVIEW OF SYSTEMS
[Anxiety] : anxiety [Negative] : Gastrointestinal [Suicidal] : not suicidal [Depression] : no depression

## 2021-09-26 NOTE — HISTORY OF PRESENT ILLNESS
[FreeTextEntry1] : F/U DEPRESSION [de-identified] : MS. ROYAL IS A PLEASANT 76 YO PRESENTING FOR FOLLOW-UP. 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  ON SERTRALINE DAILY.  SOMETIMES A LITTLE ANXIOUS.  LIVES WITH FAMILY WHO IS SUPPORTIVE. NO SUICIDAL/HOMICIDAL IDEATIONS OR PLANS.  BLOOD PRESSURE IS CONTROLLED.  ON LIPITOR FOR CHOLESTEROL.  FEELING WELL TODAY.

## 2021-10-28 ENCOUNTER — APPOINTMENT (OUTPATIENT)
Dept: CT IMAGING | Facility: CLINIC | Age: 76
End: 2021-10-28
Payer: MEDICARE

## 2021-10-28 ENCOUNTER — OUTPATIENT (OUTPATIENT)
Dept: OUTPATIENT SERVICES | Facility: HOSPITAL | Age: 76
LOS: 1 days | End: 2021-10-28
Payer: MEDICARE

## 2021-10-28 DIAGNOSIS — Z00.00 ENCOUNTER FOR GENERAL ADULT MEDICAL EXAMINATION WITHOUT ABNORMAL FINDINGS: ICD-10-CM

## 2021-10-28 DIAGNOSIS — Z98.2 PRESENCE OF CEREBROSPINAL FLUID DRAINAGE DEVICE: Chronic | ICD-10-CM

## 2021-10-28 PROCEDURE — 72052 X-RAY EXAM NECK SPINE 6/>VWS: CPT | Mod: 26

## 2021-10-28 PROCEDURE — 70450 CT HEAD/BRAIN W/O DYE: CPT | Mod: MH

## 2021-10-28 PROCEDURE — 72052 X-RAY EXAM NECK SPINE 6/>VWS: CPT

## 2021-10-28 PROCEDURE — 70450 CT HEAD/BRAIN W/O DYE: CPT | Mod: 26,MH

## 2021-12-01 LAB
25(OH)D3 SERPL-MCNC: 33.3 NG/ML
ALBUMIN SERPL ELPH-MCNC: 4.3 G/DL
ALP BLD-CCNC: 70 U/L
ALT SERPL-CCNC: 10 U/L
ANION GAP SERPL CALC-SCNC: 12 MMOL/L
AST SERPL-CCNC: 12 U/L
BILIRUB SERPL-MCNC: 0.5 MG/DL
BUN SERPL-MCNC: 16 MG/DL
CALCIUM SERPL-MCNC: 10.3 MG/DL
CHLORIDE SERPL-SCNC: 107 MMOL/L
CHOLEST SERPL-MCNC: 173 MG/DL
CO2 SERPL-SCNC: 25 MMOL/L
CREAT SERPL-MCNC: 0.79 MG/DL
GLUCOSE SERPL-MCNC: 78 MG/DL
HDLC SERPL-MCNC: 64 MG/DL
LDLC SERPL CALC-MCNC: 86 MG/DL
NONHDLC SERPL-MCNC: 110 MG/DL
POTASSIUM SERPL-SCNC: 4.3 MMOL/L
PROT SERPL-MCNC: 6.2 G/DL
SODIUM SERPL-SCNC: 144 MMOL/L
TRIGL SERPL-MCNC: 119 MG/DL

## 2021-12-16 ENCOUNTER — APPOINTMENT (OUTPATIENT)
Dept: FAMILY MEDICINE | Facility: CLINIC | Age: 76
End: 2021-12-16

## 2021-12-30 ENCOUNTER — TRANSCRIPTION ENCOUNTER (OUTPATIENT)
Age: 76
End: 2021-12-30

## 2022-01-11 ENCOUNTER — APPOINTMENT (OUTPATIENT)
Dept: FAMILY MEDICINE | Facility: CLINIC | Age: 77
End: 2022-01-11
Payer: MEDICARE

## 2022-01-11 VITALS
HEART RATE: 69 BPM | DIASTOLIC BLOOD PRESSURE: 82 MMHG | HEIGHT: 60 IN | OXYGEN SATURATION: 98 % | SYSTOLIC BLOOD PRESSURE: 126 MMHG | BODY MASS INDEX: 29.84 KG/M2 | WEIGHT: 152 LBS

## 2022-01-11 DIAGNOSIS — F32.0 MAJOR DEPRESSIVE DISORDER, SINGLE EPISODE, MILD: Chronic | ICD-10-CM

## 2022-01-11 PROCEDURE — 99214 OFFICE O/P EST MOD 30 MIN: CPT

## 2022-01-15 PROBLEM — F32.0 MILD DEPRESSION: Chronic | Status: ACTIVE | Noted: 2017-02-08

## 2022-01-15 NOTE — PLAN
[FreeTextEntry1] : NEED ALLERGIST CONSULTANT NOTES\par TO HAVE TESTING DONE WITH NEUROSURGERY FORWARDED FOR REVIEW\par BLOOD PRESSURE CONTROLLED\par DOING WELL ON CURRENT DOSAGE OF SERTRALINE - WILL CONTINUE\par STRESS REDUCTION AND INCREASED SOCIALIZATION\par GOOD SUPPORT SYSTEM\par HEALTHY DIET AND LIFESTYLE MODIFICATIONS\par CALL WITH ANY QUESTIONS, CONCERNS OR CHANGES

## 2022-01-15 NOTE — HISTORY OF PRESENT ILLNESS
[FreeTextEntry1] : f/u anxiety, MILD DEPRESSION [de-identified] : MS. ROYAL IS A PLEASANT 77 YO PRESENTING FOR FOLLOW-UP.  SAW ALLERGIST DR. YAJAIRA DON.  IS FEELING WELL ON SERTRALINE.  ANXIOUS ONCE IN AWHILE.  NO PANIC ATTACKS.  NOT FEELING DOWN OR DEPRESSED.  IS TO SEE NEUROSURGEON TOMORROW.  TO HAVE FOLLOW-UP MRI TOMORROW PRIOR TO APPOINTMENT OF HER NECK AND BACK.  CHRONIC HISTORY OF HYPERTENSION AND HYPERLIPIDEMIA.  BLOOD PRESSURE CONTROLLED.

## 2022-01-18 ENCOUNTER — APPOINTMENT (OUTPATIENT)
Dept: MRI IMAGING | Facility: CLINIC | Age: 77
End: 2022-01-18
Payer: MEDICARE

## 2022-01-18 ENCOUNTER — OUTPATIENT (OUTPATIENT)
Dept: OUTPATIENT SERVICES | Facility: HOSPITAL | Age: 77
LOS: 1 days | End: 2022-01-18

## 2022-01-18 DIAGNOSIS — Z00.8 ENCOUNTER FOR OTHER GENERAL EXAMINATION: ICD-10-CM

## 2022-01-18 DIAGNOSIS — Z98.2 PRESENCE OF CEREBROSPINAL FLUID DRAINAGE DEVICE: Chronic | ICD-10-CM

## 2022-01-18 PROCEDURE — 72146 MRI CHEST SPINE W/O DYE: CPT | Mod: 26,MH

## 2022-01-18 PROCEDURE — 72148 MRI LUMBAR SPINE W/O DYE: CPT | Mod: 26,MH

## 2022-03-01 NOTE — ED PROVIDER NOTE - CARE PLAN
Communicated results via [employer]
Principal Discharge DX:	Nonintractable headache, unspecified chronicity pattern, unspecified headache type

## 2022-04-25 ENCOUNTER — RX RENEWAL (OUTPATIENT)
Age: 77
End: 2022-04-25

## 2022-05-12 ENCOUNTER — APPOINTMENT (OUTPATIENT)
Dept: FAMILY MEDICINE | Facility: CLINIC | Age: 77
End: 2022-05-12
Payer: MEDICARE

## 2022-05-12 VITALS
OXYGEN SATURATION: 98 % | SYSTOLIC BLOOD PRESSURE: 146 MMHG | DIASTOLIC BLOOD PRESSURE: 86 MMHG | HEIGHT: 60 IN | WEIGHT: 161 LBS | BODY MASS INDEX: 31.61 KG/M2 | HEART RATE: 72 BPM

## 2022-05-12 VITALS — SYSTOLIC BLOOD PRESSURE: 114 MMHG | DIASTOLIC BLOOD PRESSURE: 84 MMHG

## 2022-05-12 LAB
BILIRUB UR QL STRIP: NEGATIVE
CLARITY UR: CLEAR
COLLECTION METHOD: NORMAL
GLUCOSE UR-MCNC: NEGATIVE
HCG UR QL: 0.2 EU/DL
HGB UR QL STRIP.AUTO: NEGATIVE
KETONES UR-MCNC: NEGATIVE
LEUKOCYTE ESTERASE UR QL STRIP: NEGATIVE
NITRITE UR QL STRIP: NEGATIVE
PH UR STRIP: 7
PROT UR STRIP-MCNC: NEGATIVE
SP GR UR STRIP: 1.02

## 2022-05-12 PROCEDURE — 81003 URINALYSIS AUTO W/O SCOPE: CPT | Mod: QW

## 2022-05-12 PROCEDURE — 99214 OFFICE O/P EST MOD 30 MIN: CPT | Mod: 25

## 2022-05-12 PROCEDURE — G0444 DEPRESSION SCREEN ANNUAL: CPT | Mod: 59

## 2022-05-12 NOTE — HISTORY OF PRESENT ILLNESS
[FreeTextEntry1] : f/u anxiety [de-identified] : MS. ROYAL IS A PLEASANT 75 YO PRESENTING FOR FOLLOW-UP.  SAW NEUROSURGERY AFTER LAST VISIT AND HAD MRI IMAGING PERFORMED OF C-SPINE AND L-CPINE.  ADVISED EVERYTHING WAS "GOOD".  CHRONIC HISTORY OF HYPERTENSION AND HYPERLIPIDEMIA.  ON IRBESARTAN FOR BLOOD PRESSURE AND ATORVASTATIN FOR CHOLESTEROL.  DUE TO SEE CARDIOLOGY IN FOLLOW-UP.  IS ON SERTRALINE FOR ANXIETY.  IS NOT FEELING DOWN OR DEPRESSED.  A LITTLE ANXIOUS AT TIMES BUT NOT BAD.  GOOD SUPPORT FROM FAMILY AND FRIENDS.  HERE WITH A FRIEND TODAY.  GOES BOWLING EVERY COUPLE OF WEEKS.  GOES OUT TO LUNCH WITH FRIENDS.  IS APART OF AN [employer].  NO SUICIDAL/HOMICIDAL IDEATIONS OR PLANS.  FOR THE PAST TWO WEEK URINE IS DARKER YELLOW.  NO BURNING.  NO CHANGE IN FREQUENCY.  NO HEMATURIA.  NO FEVER.  NO BACK PAIN OR ABDOMINAL PAIN.  DUE FOR MAMMOGRAM.  HAS NO BREAST COMPLAINTS.  NO PAIN, NIPPLE DISCHARGE, SKIN CHANGES OR LUMPS.

## 2022-05-12 NOTE — HEALTH RISK ASSESSMENT
[0] : 2) Feeling down, depressed, or hopeless: Not at all (0) [PHQ-2 Negative - No further assessment needed] : PHQ-2 Negative - No further assessment needed [[id number]] : 0

## 2022-05-15 LAB — BACTERIA UR CULT: NORMAL

## 2022-05-18 NOTE — REVIEW OF SYSTEMS
Refill Routing Note   Medication(s) are not appropriate for processing by [facility] for the following reason(s):      - Patient has not been seen in over 15 months by PCP  - Required vitals are outdated    [facility] action(s):  Defer          Medication reconciliation completed: No     Appointments  past 12m or future 3m with PCP    Date Provider   Last Visit   2/9/2021 Rick Contreras III, MD   Next Visit   Visit date not found Rick Contreras III, MD   ED visits in past 90 days: 0        Note composed:11:06 AM 05/18/2022            [Joint Pain] : joint pain [Negative] : Genitourinary [Muscle Weakness] : no muscle weakness

## 2022-05-19 ENCOUNTER — RESULT REVIEW (OUTPATIENT)
Age: 77
End: 2022-05-19

## 2022-05-19 ENCOUNTER — OUTPATIENT (OUTPATIENT)
Dept: OUTPATIENT SERVICES | Facility: HOSPITAL | Age: 77
LOS: 1 days | End: 2022-05-19
Payer: MEDICARE

## 2022-05-19 ENCOUNTER — APPOINTMENT (OUTPATIENT)
Dept: MAMMOGRAPHY | Facility: CLINIC | Age: 77
End: 2022-05-19
Payer: MEDICARE

## 2022-05-19 DIAGNOSIS — Z98.2 PRESENCE OF CEREBROSPINAL FLUID DRAINAGE DEVICE: Chronic | ICD-10-CM

## 2022-05-19 DIAGNOSIS — Z00.00 ENCOUNTER FOR GENERAL ADULT MEDICAL EXAMINATION WITHOUT ABNORMAL FINDINGS: ICD-10-CM

## 2022-05-19 PROCEDURE — 77063 BREAST TOMOSYNTHESIS BI: CPT

## 2022-05-19 PROCEDURE — 77063 BREAST TOMOSYNTHESIS BI: CPT | Mod: 26

## 2022-05-19 PROCEDURE — 77067 SCR MAMMO BI INCL CAD: CPT | Mod: 26

## 2022-05-19 PROCEDURE — 77067 SCR MAMMO BI INCL CAD: CPT

## 2022-05-27 ENCOUNTER — APPOINTMENT (OUTPATIENT)
Dept: NEUROLOGY | Facility: CLINIC | Age: 77
End: 2022-05-27
Payer: MEDICARE

## 2022-05-27 VITALS
WEIGHT: 154 LBS | HEIGHT: 60 IN | SYSTOLIC BLOOD PRESSURE: 140 MMHG | DIASTOLIC BLOOD PRESSURE: 90 MMHG | BODY MASS INDEX: 30.23 KG/M2

## 2022-05-27 DIAGNOSIS — G31.84 MILD COGNITIVE IMPAIRMENT, SO STATED: ICD-10-CM

## 2022-05-27 DIAGNOSIS — R26.9 UNSPECIFIED ABNORMALITIES OF GAIT AND MOBILITY: ICD-10-CM

## 2022-05-27 PROCEDURE — 99214 OFFICE O/P EST MOD 30 MIN: CPT

## 2022-05-27 NOTE — CONSULT LETTER
[Dear  ___] : Dear  [unfilled], [Courtesy Letter:] : I had the pleasure of seeing your patient, [unfilled], in my office today. [Please see my note below.] : Please see my note below. [Consult Closing:] : Thank you very much for allowing me to participate in the care of this patient.  If you have any questions, please do not hesitate to contact me. [Sincerely,] : Sincerely, [FreeTextEntry3] : Evans Milligan M.D., Ph.D. DPN-N\par [facility]\par Neurology at [place]\par Medical Director of Stroke Services\par [facility]\par

## 2022-05-27 NOTE — REVIEW OF SYSTEMS
[Confused or Disoriented] : confusion [Repeating Questions] : repeated questioning about recent events [Difficulty Walking] : difficulty walking [Frequent Falls] : frequent falls [Negative] : Heme/Lymph

## 2022-05-27 NOTE — ASSESSMENT
[FreeTextEntry1] : This is a 76-year-old woman with normal pressure hydrocephalus status post  shunt.  At this point she is having more difficulty with walking as well as falls and memory loss.  I like to send her for physical therapy as well as a neuropsychologic evaluation.  I will call her with the results of the neuropsych evaluation and see her back in the office next year, sooner should the need arise.

## 2022-05-27 NOTE — PHYSICAL EXAM

## 2022-05-27 NOTE — HISTORY OF PRESENT ILLNESS
[FreeTextEntry1] : Followup April 6, 2018:\par This is a 72-year-old woman who returns for followup of normal pressure hydrocephalus. She also has anxiety disorder. Regarding her normal pressure hydrocephalus she is stable. She currently sees her neurosurgeon on every two-year basis. Her last visit was this past summer and everything was well. She denies any symptoms of memory loss, incontinence of urine, or gait disorder. Regarding her anxiety she states that last fall she stopped taking her medication and several weeks later she ended up in the house with an anxiety attack. Her anxiety appears to be stable as long as she is on medication i.e. Zoloft. It worsens when she stopped taking her medication. She is also had what sounds like orthostasis when taking care of Zosyn in the morning along with her other blood pressure medications. She is here today for neurologic followup and treatment\par \par Followup April 22, 2019:\par This is a 73-year-old woman presents today for followup of normal pressure except with. She follows with Dr. Deborah Kenney neurosurgery on an every other year basis. She is scheduled to see him this year. She's had issues with her eyes but no issues relating to the normal pressure hydrocephalus. She has a limp which is due to her right knee and is scheduled to see an orthopedist in a week or 2 for followup. She does not have urinary incontinence but does have urinary urgency. She does not have significant chronic memory issues and she does not have any other gait difficulty such as a magnetic gait other than can be attributed to her right knee. She is here today for neurologic followup.\par \par Followup April 27, 2021:\par This is a 75-year-old woman presents A. for neurologic followup of normal pressure hydrocephalus. She was last seen about 2 years ago but contacted one year ago during the COVID outbreak. She is largely unchanged. She still has some issues of anxiety and is showing some features of obsessive compulsive disorder per her daughter. She did have to a surgery for cervical spinal stenosis. She posterior laminectomy with fusion. She is following up with the neurosurgeon at [place] for this as well as her  shunt. She is here today for neurologic followup.\par \par Follow-up May 27, 2022:\par This is a 76-year-old woman who presents today for follow-up of normal pressure hydrocephalus.  According to her daughter she is having more memory problems and needs to repeat things several times.  She is also experiencing more falls.  She is falling several times per day in the home.  She recently had her  shunt checked and it is working normally.  She is here today for neurologic follow-up.

## 2022-10-06 ENCOUNTER — APPOINTMENT (OUTPATIENT)
Dept: FAMILY MEDICINE | Facility: CLINIC | Age: 77
End: 2022-10-06

## 2022-10-06 VITALS
HEIGHT: 60 IN | BODY MASS INDEX: 30.82 KG/M2 | DIASTOLIC BLOOD PRESSURE: 74 MMHG | SYSTOLIC BLOOD PRESSURE: 130 MMHG | WEIGHT: 157 LBS | HEART RATE: 89 BPM | OXYGEN SATURATION: 97 %

## 2022-10-06 LAB
BILIRUB UR QL STRIP: NEGATIVE
CLARITY UR: CLEAR
COLLECTION METHOD: NORMAL
GLUCOSE UR-MCNC: NEGATIVE
HCG UR QL: 0.2 EU/DL
HGB UR QL STRIP.AUTO: NEGATIVE
KETONES UR-MCNC: NEGATIVE
LEUKOCYTE ESTERASE UR QL STRIP: ABNORMAL
NITRITE UR QL STRIP: NEGATIVE
PH UR STRIP: 6
PROT UR STRIP-MCNC: NEGATIVE
SP GR UR STRIP: 1.02

## 2022-10-06 PROCEDURE — 99214 OFFICE O/P EST MOD 30 MIN: CPT | Mod: 25

## 2022-10-06 PROCEDURE — 90662 IIV NO PRSV INCREASED AG IM: CPT

## 2022-10-06 PROCEDURE — G0008: CPT

## 2022-10-06 PROCEDURE — 81003 URINALYSIS AUTO W/O SCOPE: CPT | Mod: QW

## 2022-10-08 LAB — BACTERIA UR CULT: NORMAL

## 2022-10-08 NOTE — PHYSICAL EXAM
[No Acute Distress] : no acute distress [Well Nourished] : well nourished [Well-Appearing] : well-appearing [Normal Sclera/Conjunctiva] : normal sclera/conjunctiva [PERRL] : pupils equal round and reactive to light [No Respiratory Distress] : no respiratory distress  [No Accessory Muscle Use] : no accessory muscle use [Clear to Auscultation] : lungs were clear to auscultation bilaterally [Normal Rate] : normal rate  [Regular Rhythm] : with a regular rhythm [Normal S1, S2] : normal S1 and S2 [No CVA Tenderness] : no CVA  tenderness [No Spinal Tenderness] : no spinal tenderness [Speech Grossly Normal] : speech grossly normal [Memory Grossly Normal] : memory grossly normal [Normal Mood] : the mood was normal

## 2022-10-08 NOTE — HISTORY OF PRESENT ILLNESS
[FreeTextEntry8] : MS. ROYAL IS A PLEASANT 76 YO PRESENTING FOR ACUTE VISIT WITH A FRIEND.  1 1/2 - 2 MONTHS AGO HER URINE HAD AN EPISODE WHERE IT WAS DARKER "LIKE A LEMON".  DRANK WATER AND IS BETTER.  NO D/H/F OR HEMATURIA.  HISTORY OF CERVICAL AND LUMBAR STENOSIS.  SEES NEW ORTHOPEDIC SURGEON DR. RHIANNON BARBOSA.  LAST SEEN MARCH 2021 FOR NECK SURGERY.  DISCUSSED POSSIBLE LUMBAR SURGERY.  HAS BEEN GETTING RIGHT LOW BACK PAIN FOR THE PAST 3 - 4 MONTHS.  NO NUMBNESS/TINGLING EXTREMITIES.  NO EDEMA.  HAS HAD NO FALLS.  NO OTHER COMPLAINTS TODAY.  HYPERTENSION CONTROLLED ON IRBESARTAN.

## 2022-10-08 NOTE — PLAN
[FreeTextEntry1] : PRIOR ORTHO NOTE, LAB WORK AND IMAGING REVIEWED\par WILL CHECK URINE STUDIES\par REMAIN HYDRATED\par FALL PRECAUTIONS\par TO FOLLOW-UP WITH HER ORTHO SPINE PROVIDER\par BLOOD PRESSURE WELL CONTROLLED\par CONTINUE IRBESARTAN 75 MG DAILY\par FLU VACCINE GIVEN AND TOLERATED WELL \par CALL WITH ANY QUESTIONS, CONCERNS OR CHANGES

## 2022-11-08 ENCOUNTER — OUTPATIENT (OUTPATIENT)
Dept: OUTPATIENT SERVICES | Facility: HOSPITAL | Age: 77
LOS: 1 days | End: 2022-11-08
Payer: MEDICARE

## 2022-11-08 ENCOUNTER — APPOINTMENT (OUTPATIENT)
Dept: CT IMAGING | Facility: CLINIC | Age: 77
End: 2022-11-08

## 2022-11-08 ENCOUNTER — APPOINTMENT (OUTPATIENT)
Dept: RADIOLOGY | Facility: CLINIC | Age: 77
End: 2022-11-08

## 2022-11-08 DIAGNOSIS — Z00.8 ENCOUNTER FOR OTHER GENERAL EXAMINATION: ICD-10-CM

## 2022-11-08 DIAGNOSIS — Z98.2 PRESENCE OF CEREBROSPINAL FLUID DRAINAGE DEVICE: Chronic | ICD-10-CM

## 2022-11-08 PROCEDURE — 72040 X-RAY EXAM NECK SPINE 2-3 VW: CPT

## 2022-11-08 PROCEDURE — 70450 CT HEAD/BRAIN W/O DYE: CPT | Mod: 26,MH

## 2022-11-08 PROCEDURE — 72040 X-RAY EXAM NECK SPINE 2-3 VW: CPT | Mod: 26

## 2022-11-08 PROCEDURE — 70450 CT HEAD/BRAIN W/O DYE: CPT | Mod: MH

## 2022-11-14 ENCOUNTER — APPOINTMENT (OUTPATIENT)
Dept: FAMILY MEDICINE | Facility: CLINIC | Age: 77
End: 2022-11-14

## 2022-11-14 ENCOUNTER — NON-APPOINTMENT (OUTPATIENT)
Age: 77
End: 2022-11-14

## 2022-11-14 ENCOUNTER — LABORATORY RESULT (OUTPATIENT)
Age: 77
End: 2022-11-14

## 2022-11-14 VITALS
SYSTOLIC BLOOD PRESSURE: 144 MMHG | BODY MASS INDEX: 30.82 KG/M2 | HEIGHT: 60 IN | HEART RATE: 71 BPM | OXYGEN SATURATION: 98 % | DIASTOLIC BLOOD PRESSURE: 80 MMHG | WEIGHT: 157 LBS

## 2022-11-14 PROCEDURE — 36415 COLL VENOUS BLD VENIPUNCTURE: CPT

## 2022-11-14 PROCEDURE — G0439: CPT

## 2022-11-14 PROCEDURE — 93000 ELECTROCARDIOGRAM COMPLETE: CPT | Mod: 59

## 2022-11-14 PROCEDURE — G0444 DEPRESSION SCREEN ANNUAL: CPT

## 2022-11-14 RX ORDER — FEXOFENADINE HCL 60 MG
CAPSULE ORAL
Refills: 0 | Status: ACTIVE | COMMUNITY

## 2022-11-15 LAB
25(OH)D3 SERPL-MCNC: 37.8 NG/ML
ALBUMIN SERPL ELPH-MCNC: 4.1 G/DL
ALP BLD-CCNC: 61 U/L
ALT SERPL-CCNC: 10 U/L
ANION GAP SERPL CALC-SCNC: 8 MMOL/L
APPEARANCE: CLEAR
AST SERPL-CCNC: 10 U/L
BILIRUB SERPL-MCNC: 0.4 MG/DL
BILIRUBIN URINE: NEGATIVE
BLOOD URINE: NEGATIVE
BUN SERPL-MCNC: 18 MG/DL
CALCIUM SERPL-MCNC: 10.4 MG/DL
CHLORIDE SERPL-SCNC: 107 MMOL/L
CHOLEST SERPL-MCNC: 163 MG/DL
CO2 SERPL-SCNC: 28 MMOL/L
COLOR: NORMAL
CREAT SERPL-MCNC: 0.72 MG/DL
EGFR: 87 ML/MIN/1.73M2
GLUCOSE QUALITATIVE U: NEGATIVE
GLUCOSE SERPL-MCNC: 96 MG/DL
HDLC SERPL-MCNC: 73 MG/DL
KETONES URINE: NEGATIVE
LDLC SERPL CALC-MCNC: 74 MG/DL
LEUKOCYTE ESTERASE URINE: ABNORMAL
NITRITE URINE: NEGATIVE
NONHDLC SERPL-MCNC: 91 MG/DL
PH URINE: 6
POTASSIUM SERPL-SCNC: 4.8 MMOL/L
PROT SERPL-MCNC: 6 G/DL
PROTEIN URINE: NEGATIVE
SODIUM SERPL-SCNC: 142 MMOL/L
SPECIFIC GRAVITY URINE: 1.02
T4 FREE SERPL-MCNC: 1.2 NG/DL
TRIGL SERPL-MCNC: 83 MG/DL
TSH SERPL-ACNC: 0.81 UIU/ML
UROBILINOGEN URINE: NORMAL

## 2022-11-16 LAB
BASOPHILS # BLD AUTO: 0.03 K/UL
BASOPHILS NFR BLD AUTO: 0.7 %
EOSINOPHIL # BLD AUTO: 0.1 K/UL
EOSINOPHIL NFR BLD AUTO: 2.5 %
HCT VFR BLD CALC: 39.4 %
HGB BLD-MCNC: 13 G/DL
IMM GRANULOCYTES NFR BLD AUTO: 0.2 %
LYMPHOCYTES # BLD AUTO: 0.83 K/UL
LYMPHOCYTES NFR BLD AUTO: 20.6 %
MAN DIFF?: NORMAL
MCHC RBC-ENTMCNC: 30.4 PG
MCHC RBC-ENTMCNC: 33 GM/DL
MCV RBC AUTO: 92.3 FL
MONOCYTES # BLD AUTO: 0.33 K/UL
MONOCYTES NFR BLD AUTO: 8.2 %
NEUTROPHILS # BLD AUTO: 2.72 K/UL
NEUTROPHILS NFR BLD AUTO: 67.8 %
PLATELET # BLD AUTO: 212 K/UL
RBC # BLD: 4.27 M/UL
RBC # FLD: 13.2 %
WBC # FLD AUTO: 4.02 K/UL

## 2022-11-20 NOTE — HEALTH RISK ASSESSMENT
[Good] : ~his/her~  mood as  good [Former] : Former [0-4] : 0-4 [No] : In the past 12 months have you used drugs other than those required for medical reasons? No [One fall no injury in past year] : Patient reported one fall in the past year without injury [0] : 2) Feeling down, depressed, or hopeless: Not at all (0) [PHQ-2 Negative - No further assessment needed] : PHQ-2 Negative - No further assessment needed [HIV test declined] : HIV test declined [Hepatitis C test declined] : Hepatitis C test declined [None] : None [Alone] : lives alone [Retired] : retired [High School] : high school [] :  [# Of Children ___] : has [unfilled] children [Feels Safe at Home] : Feels safe at home [Fully functional (bathing, dressing, toileting, transferring, walking, feeding)] : Fully functional (bathing, dressing, toileting, transferring, walking, feeding) [Fully functional (using the telephone, shopping, preparing meals, housekeeping, doing laundry, using] : Fully functional and needs no help or supervision to perform IADLs (using the telephone, shopping, preparing meals, housekeeping, doing laundry, using transportation, managing medications and managing finances) [Reports normal functional visual acuity (ie: able to read med bottle)] : Reports normal functional visual acuity [Smoke Detector] : smoke detector [Carbon Monoxide Detector] : carbon monoxide detector [Safety elements used in home] : safety elements used in home [Seat Belt] :  uses seat belt [With Patient/Caregiver] : , with patient/caregiver [Designated Healthcare Proxy] : Designated healthcare proxy [Name: ___] : Health Care Proxy's Name: [unfilled]  [Relationship: ___] : Relationship: [unfilled] [YearQuit] : 1972 [de-identified] : CHAIR EXERCISES, EXERCISE VIDEO IN THE MORNING, ONCE A WEEK YOGA AT [facility] [de-identified] : WELL BALANCED, WATCHING PORTIONS, TRIES TO MAKE HEALTHY CHOICES, TRIES TO AVOID FRIED FOODS [de-identified] : SLIPPED [[id number]] : 0 [Change in mental status noted] : No change in mental status noted [Language] : denies difficulty with language [Learning/Retaining New Information] : denies difficulty learning/retaining new information [Handling Complex Tasks] : denies difficulty handling complex tasks [Sexually Active] : not sexually active [High Risk Behavior] : no high risk behavior [Reports changes in hearing] : Reports no changes in hearing [Reports changes in vision] : Reports no changes in vision [Reports changes in dental health] : Reports no changes in dental health [Sunscreen] : does not use sunscreen [MammogramDate] : 05/22 [PapSmearDate] : 04/21 [BoneDensityDate] : 02/22 [ColonoscopyDate] : 02/21 [de-identified] : ATTACHED WITH FAMILY [de-identified] : READING GLASSES, DISTANCE GLASSES; SEEING OPHTHALMOLOGY ROUTINELY [AdvancecareDate] : 11/22 [FreeTextEntry4] : Secondary Health Care Proxy: Radha Hernandez - daughter

## 2022-11-20 NOTE — HISTORY OF PRESENT ILLNESS
[FreeTextEntry1] : wellness exam [de-identified] : MS. ROYAL IS A PLEASANT 76 YO PRESENTING FOR YEARLY WELLNESS EXAM.  HISTORY OF HYPERTENSION, , HYPERLIPIDEMIA, ANXIETY AND VITAMIN D INSUFFICIENCY.

## 2022-11-20 NOTE — PHYSICAL EXAM
[No Acute Distress] : no acute distress [Well Nourished] : well nourished [Well-Appearing] : well-appearing [Normal Sclera/Conjunctiva] : normal sclera/conjunctiva [PERRL] : pupils equal round and reactive to light [Normal Outer Ear/Nose] : the outer ears and nose were normal in appearance [Normal Oropharynx] : the oropharynx was normal [No Respiratory Distress] : no respiratory distress  [No Accessory Muscle Use] : no accessory muscle use [Clear to Auscultation] : lungs were clear to auscultation bilaterally [Normal Rate] : normal rate  [Regular Rhythm] : with a regular rhythm [Normal S1, S2] : normal S1 and S2 [Soft] : abdomen soft [Non Tender] : non-tender [Normal Bowel Sounds] : normal bowel sounds [No Joint Swelling] : no joint swelling [No Rash] : no rash [Coordination Grossly Intact] : coordination grossly intact [No Focal Deficits] : no focal deficits [Speech Grossly Normal] : speech grossly normal [Memory Grossly Normal] : memory grossly normal [Normal Mood] : the mood was normal [de-identified] : CANE FOR SUPPORT

## 2022-11-20 NOTE — PLAN
[FreeTextEntry1] : VISION SCREENING\par SAFETY / HEALTHY HABITS REVIEWED\par HEALTHY DIET AND LIFESTYLE MODIFICATIONS\par VACCINATIONS DISCUSSED: COVID \par CHECK ROUTINE LAB WORK\par EKG: NSR AT 61 BPM, NON-SPECIFIC T WAVE\par FOLLOW-UP ALL SPECIALISTS AS DIRECTED INCLUDING CARDIOLOGY\par CALL WITH ANY QUESTIONS, CONCERNS OR CHANGES

## 2023-01-03 ENCOUNTER — APPOINTMENT (OUTPATIENT)
Dept: ORTHOPEDIC SURGERY | Facility: CLINIC | Age: 78
End: 2023-01-03
Payer: MEDICARE

## 2023-01-03 VITALS
HEART RATE: 70 BPM | DIASTOLIC BLOOD PRESSURE: 80 MMHG | SYSTOLIC BLOOD PRESSURE: 140 MMHG | WEIGHT: 157 LBS | BODY MASS INDEX: 30.82 KG/M2 | HEIGHT: 60 IN

## 2023-01-03 PROCEDURE — 99215 OFFICE O/P EST HI 40 MIN: CPT

## 2023-01-03 PROCEDURE — 73130 X-RAY EXAM OF HAND: CPT | Mod: RT

## 2023-01-03 NOTE — HISTORY OF PRESENT ILLNESS
[FreeTextEntry1] : Shabnam is here today with a friend who help with history.  She is a martha 77-year-old female who presents today with complaints of right hand pain.  She also complains of a growing right hand mass in the last 5 months.

## 2023-01-03 NOTE — DISCUSSION/SUMMARY
[FreeTextEntry1] : 1.  She has elected to proceed with a right hand palmar mass excisional biopsy and right index finger trigger finger release

## 2023-01-03 NOTE — ASSESSMENT
[FreeTextEntry1] : ASSESSMENT:\par \par [She was accompanied today and was assisted with explaining their complaints today.]\par The patient comes in today with acute 5-month history of worsening right hand pain in the form of a growing mass which is causing her significant physical and emotional discomfort as well as tendinosis of the adjacent right index finger\par [I have diagnosed the patient today with a new diagnosis.]\par [This is likely to diminish bodily function for the extremity.] \par \par She was adequately and thoroughly informed of my assessment of their current condition(s). \par \par Surgical discussion\par \par We had a thorough discussion regarding her current symptoms and complaints.  She has a growing mass that she wishes to be removed.  We discussed removal of the mass versus observing it.  At this point she would like for it to be removed.  I would remove it but also I mentioned sending it to the pathologist.  We talked about benign versus malignant lesions.  At this point we also talked about the need for amputation and other limb salvage surgeries if this turns out to be a malignant lesion. \par \par We talked about recurrence of the mass as a possibility.  We talked about the need for secondary surgery. \par \par \par Surgical Consent Discussion:\par \par I explained the risks and benefits of surgical intervention to the patient. The risks include, but are not limited to: pain, infection, swelling, stiffness, injury to underlying neurovascular structures, scar sensitivity, incomplete resolution of symptoms, the possibility of the need for future surgery and finally the need to comply with a post-operative occupational therapy protocol. The patient understands, agrees and informed consent was obtained. The patient’s surgery will be scheduled in the near future.\par \par \par The patient has a diagnosis of trigger finger decompression. The risks benefits alternatives were discussed with the patient. Shared decision-making was undertaken. The risks discussed included, but were not limited to pain, stiffness, PIP contracture, recurrence, pulley injury, development of Dupuytren's disease, CRPS, need for additional surgery, infection, nerve injury, tissue loss and the stardard risks of both anesthesia and surgery etc. The patient accepts these risks and has elected to proceed.\par \par \par \par

## 2023-01-03 NOTE — PHYSICAL EXAM
[de-identified] : She is well-appearing on examination\par \par Examination of the right hand reveals a large mass overlying the distal palm at the base of the right index finger.  Compression of the site causes significant discomfort.  She has pain with adjacent compression of the A1 pulley of the right index finger.  This might be from mass-effect as well. [de-identified] : [4] views of bilateral wrists and hands were obtained today in my office and were seen by me and discussed with the patient. 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

## 2023-01-03 NOTE — REVIEW OF SYSTEMS
[Joint Pain] : joint pain [Joint Stiffness] : joint stiffness [Joint Swelling] : joint swelling [Negative] : Allergic/Immunologic [FreeTextEntry9] : right hand pain

## 2023-01-13 ENCOUNTER — APPOINTMENT (OUTPATIENT)
Dept: NEUROSURGERY | Facility: CLINIC | Age: 78
End: 2023-01-13

## 2023-01-24 ENCOUNTER — OUTPATIENT (OUTPATIENT)
Dept: OUTPATIENT SERVICES | Facility: HOSPITAL | Age: 78
LOS: 1 days | End: 2023-01-24
Payer: MEDICARE

## 2023-01-24 DIAGNOSIS — Z98.2 PRESENCE OF CEREBROSPINAL FLUID DRAINAGE DEVICE: Chronic | ICD-10-CM

## 2023-01-24 DIAGNOSIS — Z01.818 ENCOUNTER FOR OTHER PREPROCEDURAL EXAMINATION: ICD-10-CM

## 2023-01-24 LAB
A1C WITH ESTIMATED AVERAGE GLUCOSE RESULT: 4.9 % — SIGNIFICANT CHANGE UP (ref 4–5.6)
ANION GAP SERPL CALC-SCNC: 7 MMOL/L — SIGNIFICANT CHANGE UP (ref 5–17)
APPEARANCE UR: CLEAR — SIGNIFICANT CHANGE UP
APTT BLD: 27.9 SEC — SIGNIFICANT CHANGE UP (ref 27.5–35.5)
BACTERIA # UR AUTO: NEGATIVE — SIGNIFICANT CHANGE UP
BASOPHILS # BLD AUTO: 0.03 K/UL — SIGNIFICANT CHANGE UP (ref 0–0.2)
BASOPHILS NFR BLD AUTO: 0.7 % — SIGNIFICANT CHANGE UP (ref 0–2)
BILIRUB UR-MCNC: NEGATIVE — SIGNIFICANT CHANGE UP
BUN SERPL-MCNC: 18 MG/DL — SIGNIFICANT CHANGE UP (ref 8–20)
CALCIUM SERPL-MCNC: 10.3 MG/DL — SIGNIFICANT CHANGE UP (ref 8.4–10.5)
CHLORIDE SERPL-SCNC: 106 MMOL/L — SIGNIFICANT CHANGE UP (ref 96–108)
CO2 SERPL-SCNC: 28 MMOL/L — SIGNIFICANT CHANGE UP (ref 22–29)
COLOR SPEC: YELLOW — SIGNIFICANT CHANGE UP
CREAT SERPL-MCNC: 0.67 MG/DL — SIGNIFICANT CHANGE UP (ref 0.5–1.3)
DIFF PNL FLD: NEGATIVE — SIGNIFICANT CHANGE UP
EGFR: 91 ML/MIN/1.73M2 — SIGNIFICANT CHANGE UP
EOSINOPHIL # BLD AUTO: 0.1 K/UL — SIGNIFICANT CHANGE UP (ref 0–0.5)
EOSINOPHIL NFR BLD AUTO: 2.5 % — SIGNIFICANT CHANGE UP (ref 0–6)
EPI CELLS # UR: SIGNIFICANT CHANGE UP
ESTIMATED AVERAGE GLUCOSE: 94 MG/DL — SIGNIFICANT CHANGE UP (ref 68–114)
GLUCOSE SERPL-MCNC: 93 MG/DL — SIGNIFICANT CHANGE UP (ref 70–99)
GLUCOSE UR QL: NEGATIVE MG/DL — SIGNIFICANT CHANGE UP
HCT VFR BLD CALC: 39.1 % — SIGNIFICANT CHANGE UP (ref 34.5–45)
HGB BLD-MCNC: 12.7 G/DL — SIGNIFICANT CHANGE UP (ref 11.5–15.5)
IMM GRANULOCYTES NFR BLD AUTO: 0.2 % — SIGNIFICANT CHANGE UP (ref 0–0.9)
INR BLD: 1 RATIO — SIGNIFICANT CHANGE UP (ref 0.88–1.16)
KETONES UR-MCNC: NEGATIVE — SIGNIFICANT CHANGE UP
LEUKOCYTE ESTERASE UR-ACNC: ABNORMAL
LYMPHOCYTES # BLD AUTO: 0.96 K/UL — LOW (ref 1–3.3)
LYMPHOCYTES # BLD AUTO: 23.8 % — SIGNIFICANT CHANGE UP (ref 13–44)
MCHC RBC-ENTMCNC: 29.3 PG — SIGNIFICANT CHANGE UP (ref 27–34)
MCHC RBC-ENTMCNC: 32.5 GM/DL — SIGNIFICANT CHANGE UP (ref 32–36)
MCV RBC AUTO: 90.3 FL — SIGNIFICANT CHANGE UP (ref 80–100)
MONOCYTES # BLD AUTO: 0.38 K/UL — SIGNIFICANT CHANGE UP (ref 0–0.9)
MONOCYTES NFR BLD AUTO: 9.4 % — SIGNIFICANT CHANGE UP (ref 2–14)
NEUTROPHILS # BLD AUTO: 2.55 K/UL — SIGNIFICANT CHANGE UP (ref 1.8–7.4)
NEUTROPHILS NFR BLD AUTO: 63.4 % — SIGNIFICANT CHANGE UP (ref 43–77)
NITRITE UR-MCNC: NEGATIVE — SIGNIFICANT CHANGE UP
PH UR: 7 — SIGNIFICANT CHANGE UP (ref 5–8)
PLATELET # BLD AUTO: 225 K/UL — SIGNIFICANT CHANGE UP (ref 150–400)
POTASSIUM SERPL-MCNC: 4.8 MMOL/L — SIGNIFICANT CHANGE UP (ref 3.5–5.3)
POTASSIUM SERPL-SCNC: 4.8 MMOL/L — SIGNIFICANT CHANGE UP (ref 3.5–5.3)
PROT UR-MCNC: NEGATIVE — SIGNIFICANT CHANGE UP
PROTHROM AB SERPL-ACNC: 11.6 SEC — SIGNIFICANT CHANGE UP (ref 10.5–13.4)
RBC # BLD: 4.33 M/UL — SIGNIFICANT CHANGE UP (ref 3.8–5.2)
RBC # FLD: 12.4 % — SIGNIFICANT CHANGE UP (ref 10.3–14.5)
RBC CASTS # UR COMP ASSIST: SIGNIFICANT CHANGE UP /HPF (ref 0–4)
SODIUM SERPL-SCNC: 141 MMOL/L — SIGNIFICANT CHANGE UP (ref 135–145)
SP GR SPEC: 1.01 — SIGNIFICANT CHANGE UP (ref 1.01–1.02)
UROBILINOGEN FLD QL: NEGATIVE MG/DL — SIGNIFICANT CHANGE UP
WBC # BLD: 4.03 K/UL — SIGNIFICANT CHANGE UP (ref 3.8–10.5)
WBC # FLD AUTO: 4.03 K/UL — SIGNIFICANT CHANGE UP (ref 3.8–10.5)
WBC UR QL: SIGNIFICANT CHANGE UP /HPF (ref 0–5)

## 2023-01-24 PROCEDURE — 93010 ELECTROCARDIOGRAM REPORT: CPT

## 2023-01-24 PROCEDURE — 93005 ELECTROCARDIOGRAM TRACING: CPT

## 2023-01-24 PROCEDURE — G0463: CPT

## 2023-01-25 ENCOUNTER — APPOINTMENT (OUTPATIENT)
Dept: FAMILY MEDICINE | Facility: CLINIC | Age: 78
End: 2023-01-25
Payer: MEDICARE

## 2023-01-25 VITALS
HEART RATE: 72 BPM | BODY MASS INDEX: 30.23 KG/M2 | HEIGHT: 60 IN | DIASTOLIC BLOOD PRESSURE: 80 MMHG | OXYGEN SATURATION: 99 % | SYSTOLIC BLOOD PRESSURE: 130 MMHG | WEIGHT: 154 LBS

## 2023-01-25 DIAGNOSIS — R39.89 OTHER SYMPTOMS AND SIGNS INVOLVING THE GENITOURINARY SYSTEM: ICD-10-CM

## 2023-01-25 DIAGNOSIS — Z87.891 PERSONAL HISTORY OF NICOTINE DEPENDENCE: ICD-10-CM

## 2023-01-25 DIAGNOSIS — Z87.42 PERSONAL HISTORY OF OTHER DISEASES OF THE FEMALE GENITAL TRACT: ICD-10-CM

## 2023-01-25 DIAGNOSIS — R82.90 UNSPECIFIED ABNORMAL FINDINGS IN URINE: ICD-10-CM

## 2023-01-25 DIAGNOSIS — Z92.29 PERSONAL HISTORY OF OTHER DRUG THERAPY: ICD-10-CM

## 2023-01-25 DIAGNOSIS — R20.8 OTHER DISTURBANCES OF SKIN SENSATION: ICD-10-CM

## 2023-01-25 DIAGNOSIS — Z87.09 PERSONAL HISTORY OF OTHER DISEASES OF THE RESPIRATORY SYSTEM: ICD-10-CM

## 2023-01-25 DIAGNOSIS — Z87.39 PERSONAL HISTORY OF OTHER DISEASES OF THE MUSCULOSKELETAL SYSTEM AND CONNECTIVE TISSUE: ICD-10-CM

## 2023-01-25 DIAGNOSIS — Z86.69 PERSONAL HISTORY OF OTHER DISEASES OF THE NERVOUS SYSTEM AND SENSE ORGANS: ICD-10-CM

## 2023-01-25 DIAGNOSIS — M53.3 SACROCOCCYGEAL DISORDERS, NOT ELSEWHERE CLASSIFIED: ICD-10-CM

## 2023-01-25 DIAGNOSIS — Z87.898 PERSONAL HISTORY OF OTHER SPECIFIED CONDITIONS: ICD-10-CM

## 2023-01-25 DIAGNOSIS — R79.89 OTHER SPECIFIED ABNORMAL FINDINGS OF BLOOD CHEMISTRY: ICD-10-CM

## 2023-01-25 DIAGNOSIS — Z23 ENCOUNTER FOR IMMUNIZATION: ICD-10-CM

## 2023-01-25 DIAGNOSIS — Z86.79 PERSONAL HISTORY OF OTHER DISEASES OF THE CIRCULATORY SYSTEM: ICD-10-CM

## 2023-01-25 DIAGNOSIS — R82.998 OTHER ABNORMAL FINDINGS IN URINE: ICD-10-CM

## 2023-01-25 LAB
CULTURE RESULTS: SIGNIFICANT CHANGE UP
SPECIMEN SOURCE: SIGNIFICANT CHANGE UP

## 2023-01-25 PROCEDURE — 99214 OFFICE O/P EST MOD 30 MIN: CPT

## 2023-01-27 NOTE — HISTORY OF PRESENT ILLNESS
[No Pertinent Cardiac History] : no history of aortic stenosis, atrial fibrillation, coronary artery disease, recent myocardial infarction, or implantable device/pacemaker [No Pertinent Pulmonary History] : no history of asthma, COPD, sleep apnea, or smoking [No Adverse Anesthesia Reaction] : no adverse anesthesia reaction in self or family member [(Patient denies any chest pain, claudication, dyspnea on exertion, orthopnea, palpitations or syncope)] : Patient denies any chest pain, claudication, dyspnea on exertion, orthopnea, palpitations or syncope [Chronic Anticoagulation] : no chronic anticoagulation [Chronic Kidney Disease] : no chronic kidney disease [Diabetes] : no diabetes [FreeTextEntry1] : RIGHT HAND PALMAR MASS EXCISIONAL BIOPSY AND RIGHT INDEX FINGER TRIGGER FINGER RELEASE [FreeTextEntry2] : JANUARY 30, 2023 [FreeTextEntry3] : DR. CROSS [FreeTextEntry4] : MS. ROYAL IS A PLEASANT 76 YO PRESENTING FOR MEDICAL CLEARANCE FOR UPCOMING HAND SURGERY DUE TO RIGHT HAND MASS THE LAST FEW MONTHS AND PAIN.  IS FEELING WELL TODAY.  NO HISTORY OF MI, STROKE OR SEIZURE.  NO PERSONAL OR FAMILY HISTORY OF BLOOD OR CLOTTING DISORDERS.  DENIES EASY BLEEDING OR BRUISING.  HAS HAD NO HEADACHE, DIZZINESS, CHEST PAIN, SHORTNESS OF BREATH, GI OR URINARY COMPLAINTS.  NO OTHER COMPLAINTS TODAY.  HISTORY OF NORMAL PRESSURE HYDROCEPHALUS S/P  SHUNT, HYPERTENSION, , HYPERLIPIDEMIA, ANXIETY AND VITAMIN D INSUFFICIENCY.  [FreeTextEntry7] : STRESS TEST 2019\par ECHOCARDIOGRAM 2019

## 2023-01-27 NOTE — PLAN
[FreeTextEntry1] : OPTIMIZED MEDICALLY FOR PROPOSED SURGERY PROCEDURE PENDING PRE-OPERATIVE TESTING REVIEW\par EKG: SINUS AT 54 BPM, NO ACUTE ST-T WAVE CHANGES; SIMILAR TO PRIOR\par BLOOD PRESSURE WELL CONTROLLED\par GI/DVT PROPHYLAXIS PER SURGERY\par AVOIDANCE OF NSAIDS, VITAMINS AND ASPIRIN CONTAINING PRODUCTS PRIOR TO SURGERY\par CALL WITH ANY QUESTIONS, CONCERNS OR CHANGES PRIOR TO PROCEDURE\par SUPPORT PROVIDED\par FOLLOW-UP POST-OPERATIVELY \par \par addendum 1/27/23: pre-operative lab work reviewed; optimized medically for proposed procedure if covid testing negative.

## 2023-01-27 NOTE — PHYSICAL EXAM
[No Acute Distress] : no acute distress [Well Nourished] : well nourished [Well-Appearing] : well-appearing [Normal Sclera/Conjunctiva] : normal sclera/conjunctiva [PERRL] : pupils equal round and reactive to light [Normal Outer Ear/Nose] : the outer ears and nose were normal in appearance [Normal Oropharynx] : the oropharynx was normal [No Lymphadenopathy] : no lymphadenopathy [Supple] : supple [No Respiratory Distress] : no respiratory distress  [No Accessory Muscle Use] : no accessory muscle use [Clear to Auscultation] : lungs were clear to auscultation bilaterally [Normal Rate] : normal rate  [Regular Rhythm] : with a regular rhythm [Normal S1, S2] : normal S1 and S2 [Soft] : abdomen soft [Non Tender] : non-tender [Normal Bowel Sounds] : normal bowel sounds [No Joint Swelling] : no joint swelling [No Rash] : no rash [Coordination Grossly Intact] : coordination grossly intact [No Focal Deficits] : no focal deficits [Speech Grossly Normal] : speech grossly normal [Memory Grossly Normal] : memory grossly normal [Normal Mood] : the mood was normal [de-identified] : WALKER FOR SUPPORT

## 2023-01-30 ENCOUNTER — APPOINTMENT (OUTPATIENT)
Dept: ORTHOPEDIC SURGERY | Facility: AMBULATORY SURGERY CENTER | Age: 78
End: 2023-01-30
Payer: MEDICARE

## 2023-01-30 PROCEDURE — 26145 TENDON EXCISION PALM/FINGER: CPT | Mod: F6

## 2023-01-30 PROCEDURE — 26116 EXC HAND TUM DEEP < 1.5 CM: CPT | Mod: RT

## 2023-02-07 ENCOUNTER — APPOINTMENT (OUTPATIENT)
Dept: ORTHOPEDIC SURGERY | Facility: CLINIC | Age: 78
End: 2023-02-07
Payer: MEDICARE

## 2023-02-07 DIAGNOSIS — M79.641 PAIN IN RIGHT HAND: ICD-10-CM

## 2023-02-07 PROCEDURE — 99024 POSTOP FOLLOW-UP VISIT: CPT

## 2023-02-07 NOTE — HISTORY OF PRESENT ILLNESS
[de-identified] : s/p right hand palmar mass excision. Right index finger trigger release and tenosynovectomy. 1/30/23 [de-identified] : Shabnam returns for follow-up.  Overall she denies pain.  She tells me that at this point she has to travel for her sister's  [de-identified] : She is well-appearing on examination.  Examination of the surgical site in the right hand reveals no signs of infection and excellent healing [de-identified] : We are both delighted with the results.  She has minimal stiffness on examination.  At this point time she would like to forego occupational therapy as she has other important things to think about at this point in time [de-identified] : I would like to see her back in 4 weeks time.  I have provided her with a prescription for occupational therapy

## 2023-03-21 ENCOUNTER — APPOINTMENT (OUTPATIENT)
Dept: ORTHOPEDIC SURGERY | Facility: CLINIC | Age: 78
End: 2023-03-21

## 2023-04-17 ENCOUNTER — APPOINTMENT (OUTPATIENT)
Dept: NEUROLOGY | Facility: CLINIC | Age: 78
End: 2023-04-17

## 2023-04-24 ENCOUNTER — RX RENEWAL (OUTPATIENT)
Age: 78
End: 2023-04-24

## 2023-05-23 ENCOUNTER — RESULT REVIEW (OUTPATIENT)
Age: 78
End: 2023-05-23

## 2023-05-23 ENCOUNTER — APPOINTMENT (OUTPATIENT)
Dept: MAMMOGRAPHY | Facility: CLINIC | Age: 78
End: 2023-05-23
Payer: MEDICARE

## 2023-05-23 ENCOUNTER — APPOINTMENT (OUTPATIENT)
Dept: NEUROLOGY | Facility: CLINIC | Age: 78
End: 2023-05-23

## 2023-05-23 ENCOUNTER — OUTPATIENT (OUTPATIENT)
Dept: OUTPATIENT SERVICES | Facility: HOSPITAL | Age: 78
LOS: 1 days | End: 2023-05-23
Payer: MEDICARE

## 2023-05-23 DIAGNOSIS — Z12.39 ENCOUNTER FOR OTHER SCREENING FOR MALIGNANT NEOPLASM OF BREAST: ICD-10-CM

## 2023-05-23 DIAGNOSIS — Z98.2 PRESENCE OF CEREBROSPINAL FLUID DRAINAGE DEVICE: Chronic | ICD-10-CM

## 2023-05-23 PROCEDURE — 77067 SCR MAMMO BI INCL CAD: CPT | Mod: 26

## 2023-05-23 PROCEDURE — 77063 BREAST TOMOSYNTHESIS BI: CPT

## 2023-05-23 PROCEDURE — 77063 BREAST TOMOSYNTHESIS BI: CPT | Mod: 26

## 2023-05-23 PROCEDURE — 77067 SCR MAMMO BI INCL CAD: CPT

## 2023-06-09 ENCOUNTER — RX RENEWAL (OUTPATIENT)
Age: 78
End: 2023-06-09

## 2023-07-11 ENCOUNTER — APPOINTMENT (OUTPATIENT)
Dept: FAMILY MEDICINE | Facility: CLINIC | Age: 78
End: 2023-07-11
Payer: MEDICARE

## 2023-07-11 ENCOUNTER — NON-APPOINTMENT (OUTPATIENT)
Age: 78
End: 2023-07-11

## 2023-07-11 VITALS
SYSTOLIC BLOOD PRESSURE: 136 MMHG | WEIGHT: 154 LBS | DIASTOLIC BLOOD PRESSURE: 88 MMHG | HEART RATE: 80 BPM | HEIGHT: 60 IN | OXYGEN SATURATION: 97 % | BODY MASS INDEX: 30.23 KG/M2

## 2023-07-11 DIAGNOSIS — E55.9 VITAMIN D DEFICIENCY, UNSPECIFIED: ICD-10-CM

## 2023-07-11 DIAGNOSIS — F41.9 ANXIETY DISORDER, UNSPECIFIED: ICD-10-CM

## 2023-07-11 PROCEDURE — 99214 OFFICE O/P EST MOD 30 MIN: CPT

## 2023-07-16 NOTE — HISTORY OF PRESENT ILLNESS
[FreeTextEntry1] : CHECK-UP [de-identified] : MS. ROYAL IS A PLEASANT 79 YO PRESENTING FOR FOLLOW-UP WITH HER DAUGHTER.  SHE SWITCHED NEUROSURGERY TO DR. FUENTES LAST WEEK.  TO HAVE MRI AND CT SCAN.  IS GETTING PROLIA FROM RHEUMATOLOGY DR. MONET.  HAS HAD NO RECENT FALLS.  IS NOT FEELING DOWN, NO ANXIOUS, NO PANIC ATTACKS.  IS ON SERTRALINE.  BLOOD PRESSURE CONTROLLED ON IRBESARTAN AND IS ON LIPITOR FOR CHOLESTEROL.

## 2023-07-16 NOTE — PLAN
[FreeTextEntry1] : RECENT LAB WORK REVIEWED\par WILL CHECK LIPIDS AND VITAMIN D LEVELS\par HEALTHY DIET AND LIFESTYLE MODIFICATIONS\par FALL PRECAUTIONS\par DOING WELL ON SERTRALINE 25 MG DAILY -W ILL CONTINUE\par STRESS REDUCTION\par NEED NEUROSURGERY CONSULTANT NOTES\par FOLLOW-UP ALL SPECIALISTS AS DIRECTED \par CALL WITH ANY QUESTIONS, CONCERNS OR CHANGES

## 2023-07-25 ENCOUNTER — OUTPATIENT (OUTPATIENT)
Dept: OUTPATIENT SERVICES | Facility: HOSPITAL | Age: 78
LOS: 1 days | End: 2023-07-25

## 2023-07-25 ENCOUNTER — APPOINTMENT (OUTPATIENT)
Dept: CT IMAGING | Facility: CLINIC | Age: 78
End: 2023-07-25
Payer: MEDICARE

## 2023-07-25 ENCOUNTER — RESULT REVIEW (OUTPATIENT)
Age: 78
End: 2023-07-25

## 2023-07-25 ENCOUNTER — APPOINTMENT (OUTPATIENT)
Dept: MRI IMAGING | Facility: CLINIC | Age: 78
End: 2023-07-25
Payer: MEDICARE

## 2023-07-25 DIAGNOSIS — Z00.00 ENCOUNTER FOR GENERAL ADULT MEDICAL EXAMINATION WITHOUT ABNORMAL FINDINGS: ICD-10-CM

## 2023-07-25 PROCEDURE — 72148 MRI LUMBAR SPINE W/O DYE: CPT | Mod: 26,MH

## 2023-07-25 PROCEDURE — 70450 CT HEAD/BRAIN W/O DYE: CPT | Mod: 26,MH

## 2023-09-19 PROBLEM — S09.90XA HEAD INJURY: Status: ACTIVE | Noted: 2023-09-19

## 2023-09-19 PROBLEM — Z87.898 HISTORY OF URINARY URGENCY: Status: RESOLVED | Noted: 2023-09-19 | Resolved: 2023-09-19

## 2023-09-19 PROBLEM — I72.9 ANEURYSM: Status: ACTIVE | Noted: 2023-09-19

## 2023-09-22 ENCOUNTER — APPOINTMENT (OUTPATIENT)
Dept: UROGYNECOLOGY | Facility: CLINIC | Age: 78
End: 2023-09-22
Payer: MEDICARE

## 2023-09-22 VITALS
SYSTOLIC BLOOD PRESSURE: 164 MMHG | HEIGHT: 60 IN | BODY MASS INDEX: 29.93 KG/M2 | DIASTOLIC BLOOD PRESSURE: 70 MMHG | WEIGHT: 152.44 LBS

## 2023-09-22 DIAGNOSIS — I72.9 ANEURYSM OF UNSPECIFIED SITE: ICD-10-CM

## 2023-09-22 DIAGNOSIS — S09.90XA UNSPECIFIED INJURY OF HEAD, INITIAL ENCOUNTER: ICD-10-CM

## 2023-09-22 DIAGNOSIS — Z87.898 PERSONAL HISTORY OF OTHER SPECIFIED CONDITIONS: ICD-10-CM

## 2023-09-22 DIAGNOSIS — Z63.4 DISAPPEARANCE AND DEATH OF FAMILY MEMBER: ICD-10-CM

## 2023-09-22 DIAGNOSIS — Z86.73 PERSONAL HISTORY OF TRANSIENT ISCHEMIC ATTACK (TIA), AND CEREBRAL INFARCTION W/OUT RESIDUAL DEFICITS: ICD-10-CM

## 2023-09-22 LAB
BILIRUB UR QL STRIP: NEGATIVE
CLARITY UR: CLEAR
COLLECTION METHOD: NORMAL
GLUCOSE UR-MCNC: NEGATIVE
HCG UR QL: 0.2 EU/DL
HGB UR QL STRIP.AUTO: NORMAL
KETONES UR-MCNC: NEGATIVE
LEUKOCYTE ESTERASE UR QL STRIP: NEGATIVE
NITRITE UR QL STRIP: NEGATIVE
PH UR STRIP: 7
PROT UR STRIP-MCNC: NEGATIVE
SP GR UR STRIP: 1.02

## 2023-09-22 PROCEDURE — 99214 OFFICE O/P EST MOD 30 MIN: CPT | Mod: 25

## 2023-09-22 PROCEDURE — 81003 URINALYSIS AUTO W/O SCOPE: CPT | Mod: QW

## 2023-09-22 PROCEDURE — 51701 INSERT BLADDER CATHETER: CPT | Mod: 59

## 2023-09-22 SDOH — SOCIAL STABILITY - SOCIAL INSECURITY: DISSAPEARANCE AND DEATH OF FAMILY MEMBER: Z63.4

## 2023-09-25 LAB
AMORPHOUS PHOSPHATE CRYSTALS: PRESENT
APPEARANCE: ABNORMAL
BACTERIA UR CULT: NORMAL
BACTERIA: NEGATIVE /HPF
BILIRUBIN URINE: NEGATIVE
BLOOD URINE: NEGATIVE
CAST: 1 /LPF
COLOR: YELLOW
EPITHELIAL CELLS: 1 /HPF
GLUCOSE QUALITATIVE U: NEGATIVE MG/DL
KETONES URINE: NEGATIVE MG/DL
LEUKOCYTE ESTERASE URINE: NEGATIVE
MICROSCOPIC-UA: NORMAL
NITRITE URINE: NEGATIVE
PH URINE: 7.5
PROTEIN URINE: NEGATIVE MG/DL
RED BLOOD CELLS URINE: NORMAL /HPF
REVIEW: NORMAL
SPECIFIC GRAVITY URINE: 1.02
UROBILINOGEN URINE: 0.2 MG/DL
WHITE BLOOD CELLS URINE: 2 /HPF

## 2023-09-28 ENCOUNTER — EMERGENCY (EMERGENCY)
Facility: HOSPITAL | Age: 78
LOS: 1 days | Discharge: HOME | End: 2023-09-28
Attending: EMERGENCY MEDICINE
Payer: MEDICARE

## 2023-09-28 VITALS
HEIGHT: 60 IN | OXYGEN SATURATION: 95 % | RESPIRATION RATE: 18 BRPM | HEART RATE: 64 BPM | TEMPERATURE: 98 F | WEIGHT: 154.54 LBS | SYSTOLIC BLOOD PRESSURE: 148 MMHG | DIASTOLIC BLOOD PRESSURE: 82 MMHG

## 2023-09-28 DIAGNOSIS — Z98.2 PRESENCE OF CEREBROSPINAL FLUID DRAINAGE DEVICE: Chronic | ICD-10-CM

## 2023-09-28 LAB
ALBUMIN SERPL ELPH-MCNC: 3.9 G/DL — SIGNIFICANT CHANGE UP (ref 3.3–5.2)
ALP SERPL-CCNC: 70 U/L — SIGNIFICANT CHANGE UP (ref 40–120)
ALT FLD-CCNC: 11 U/L — SIGNIFICANT CHANGE UP
ANION GAP SERPL CALC-SCNC: 11 MMOL/L — SIGNIFICANT CHANGE UP (ref 5–17)
AST SERPL-CCNC: 11 U/L — SIGNIFICANT CHANGE UP
BASOPHILS # BLD AUTO: 0.04 K/UL — SIGNIFICANT CHANGE UP (ref 0–0.2)
BASOPHILS NFR BLD AUTO: 0.9 % — SIGNIFICANT CHANGE UP (ref 0–2)
BILIRUB SERPL-MCNC: 0.3 MG/DL — LOW (ref 0.4–2)
BUN SERPL-MCNC: 34.2 MG/DL — HIGH (ref 8–20)
CALCIUM SERPL-MCNC: 11 MG/DL — HIGH (ref 8.4–10.5)
CHLORIDE SERPL-SCNC: 106 MMOL/L — SIGNIFICANT CHANGE UP (ref 96–108)
CO2 SERPL-SCNC: 26 MMOL/L — SIGNIFICANT CHANGE UP (ref 22–29)
CREAT SERPL-MCNC: 0.79 MG/DL — SIGNIFICANT CHANGE UP (ref 0.5–1.3)
EGFR: 77 ML/MIN/1.73M2 — SIGNIFICANT CHANGE UP
EOSINOPHIL # BLD AUTO: 0.13 K/UL — SIGNIFICANT CHANGE UP (ref 0–0.5)
EOSINOPHIL NFR BLD AUTO: 2.9 % — SIGNIFICANT CHANGE UP (ref 0–6)
GLUCOSE SERPL-MCNC: 100 MG/DL — HIGH (ref 70–99)
HCT VFR BLD CALC: 35.6 % — SIGNIFICANT CHANGE UP (ref 34.5–45)
HGB BLD-MCNC: 11.5 G/DL — SIGNIFICANT CHANGE UP (ref 11.5–15.5)
IMM GRANULOCYTES NFR BLD AUTO: 0.2 % — SIGNIFICANT CHANGE UP (ref 0–0.9)
LYMPHOCYTES # BLD AUTO: 1.13 K/UL — SIGNIFICANT CHANGE UP (ref 1–3.3)
LYMPHOCYTES # BLD AUTO: 24.9 % — SIGNIFICANT CHANGE UP (ref 13–44)
MCHC RBC-ENTMCNC: 29.3 PG — SIGNIFICANT CHANGE UP (ref 27–34)
MCHC RBC-ENTMCNC: 32.3 GM/DL — SIGNIFICANT CHANGE UP (ref 32–36)
MCV RBC AUTO: 90.8 FL — SIGNIFICANT CHANGE UP (ref 80–100)
MONOCYTES # BLD AUTO: 0.45 K/UL — SIGNIFICANT CHANGE UP (ref 0–0.9)
MONOCYTES NFR BLD AUTO: 9.9 % — SIGNIFICANT CHANGE UP (ref 2–14)
NEUTROPHILS # BLD AUTO: 2.77 K/UL — SIGNIFICANT CHANGE UP (ref 1.8–7.4)
NEUTROPHILS NFR BLD AUTO: 61.2 % — SIGNIFICANT CHANGE UP (ref 43–77)
PLATELET # BLD AUTO: 205 K/UL — SIGNIFICANT CHANGE UP (ref 150–400)
POTASSIUM SERPL-MCNC: 4.1 MMOL/L — SIGNIFICANT CHANGE UP (ref 3.5–5.3)
POTASSIUM SERPL-SCNC: 4.1 MMOL/L — SIGNIFICANT CHANGE UP (ref 3.5–5.3)
PROT SERPL-MCNC: 6.2 G/DL — LOW (ref 6.6–8.7)
RBC # BLD: 3.92 M/UL — SIGNIFICANT CHANGE UP (ref 3.8–5.2)
RBC # FLD: 12.8 % — SIGNIFICANT CHANGE UP (ref 10.3–14.5)
SODIUM SERPL-SCNC: 142 MMOL/L — SIGNIFICANT CHANGE UP (ref 135–145)
WBC # BLD: 4.53 K/UL — SIGNIFICANT CHANGE UP (ref 3.8–10.5)
WBC # FLD AUTO: 4.53 K/UL — SIGNIFICANT CHANGE UP (ref 3.8–10.5)

## 2023-09-28 PROCEDURE — 85025 COMPLETE CBC W/AUTO DIFF WBC: CPT

## 2023-09-28 PROCEDURE — 36415 COLL VENOUS BLD VENIPUNCTURE: CPT

## 2023-09-28 PROCEDURE — 80053 COMPREHEN METABOLIC PANEL: CPT

## 2023-09-28 PROCEDURE — 71045 X-RAY EXAM CHEST 1 VIEW: CPT

## 2023-09-28 PROCEDURE — 74018 RADEX ABDOMEN 1 VIEW: CPT | Mod: 26

## 2023-09-28 PROCEDURE — 74018 RADEX ABDOMEN 1 VIEW: CPT

## 2023-09-28 PROCEDURE — 70250 X-RAY EXAM OF SKULL: CPT

## 2023-09-28 PROCEDURE — 99285 EMERGENCY DEPT VISIT HI MDM: CPT | Mod: GC

## 2023-09-28 PROCEDURE — 99284 EMERGENCY DEPT VISIT MOD MDM: CPT | Mod: 25

## 2023-09-28 PROCEDURE — 70450 CT HEAD/BRAIN W/O DYE: CPT | Mod: MA

## 2023-09-28 PROCEDURE — 71045 X-RAY EXAM CHEST 1 VIEW: CPT | Mod: 26

## 2023-09-28 PROCEDURE — 70250 X-RAY EXAM OF SKULL: CPT | Mod: 26

## 2023-09-28 PROCEDURE — 70450 CT HEAD/BRAIN W/O DYE: CPT | Mod: 26,MA

## 2023-09-28 RX ORDER — ACETAMINOPHEN 500 MG
975 TABLET ORAL ONCE
Refills: 0 | Status: DISCONTINUED | OUTPATIENT
Start: 2023-09-28 | End: 2023-09-28

## 2023-09-28 NOTE — ED PROVIDER NOTE - CLINICAL SUMMARY MEDICAL DECISION MAKING FREE TEXT BOX
78y Female with head pressure in the setting of hydrocephalus s/p  shunt. No blurred vision, dizziness, nausea, vomiting. Hemodynamically stable, neurovascularly intact. Differential diagnosis includes but not limited to hydrocephalus, shunt obstruction. 78y Female with head pressure in the setting of hydrocephalus s/p  shunt. No blurred vision, dizziness, nausea, vomiting. Hemodynamically stable, neurovascularly intact. Differential diagnosis includes but not limited to hydrocephalus, shunt obstruction. Labs and imaging independently reviewed and interpreted with no acute pathology noted. Patient to follow with her neurologist/neurosurgeon outpatient. 78y Female with head pressure in the setting of hydrocephalus s/p  shunt. No blurred vision, dizziness, nausea, vomiting. Hemodynamically stable, neurovascularly intact. Differential diagnosis includes but not limited to hydrocephalus, shunt obstruction. Labs and imaging independently reviewed and interpreted with no acute pathology noted. Patient to follow with her neurologist/neurosurgeon outpatient.    cait: pt is feeling better no ha at this time normal neuro exam ct head stable will dc with outpatient follow up with pt's neurology/neurosurgery

## 2023-09-28 NOTE — ED PROVIDER NOTE - OBJECTIVE STATEMENT
78y Female with history of hydrocephalus s/p  shunt in 1997 revised in 2004 then spontaneous head bleed in 2005 presenting with head pressure since 8pm with no blurred vision, dizziness, nausea, vomiting.  shunt last adjusted in July. Denies fevers, chills, headache, chest pain, palpitations, shortness of breath, cough, diarrhea, hematuria, dysuria, dark stools, focal neurologic symptoms.

## 2023-09-28 NOTE — ED ADULT TRIAGE NOTE - HEART RATE (BEATS/MIN)
May 4, 2022     Patient: Samuel Field   YOB: 1972   Date of Visit: 5/4/2022       To Whom it May Concern:    Samuel Field was seen in my clinic on 5/4/2022 at 4:45 pm.    Please excuse Samuel for his absence from work on the date listed above to be able to make his appointment.  No work tomorrow 5/5/2022, may return when fever free for 24 hours without fever reducing medications and feeling improved    Sincerely,         Mana Baldwin CNP    Medical information is confidential and cannot be disclosed without the written consent of the patient or his representative.       65

## 2023-09-28 NOTE — ED PROVIDER NOTE - ATTENDING CONTRIBUTION TO CARE
Dr. Larios : I have personally seen and examined this patient at the bedside. I have fully participated in the care of this patient. I have reviewed all pertinent clinical information, including history, physical exam, plan and the Resident's note and agree except as noted.       78y Female with history of hydrocephalus s/p  shunt in 1997 revised in 2004 then spontaneous head bleed in 2005 presenting with head pressure since 8pm. gradual onset,  no blurred vision, dizziness, nausea, vomiting.  shunt last adjusted in July.     Denies f/c/n/v/cp/sob/palpitations/cough/abd.pain/d/c/dysuria/hematuria. NO  sick contacts/recent travel.    PE:  head; atraumatic normocephalic  eyes: perrla  Heart: rrr s1s2  lungs: ctab  abd: soft, nt nd + bs no rebound/guarding no cva ttp  le: no swelling no calf ttp  back: no midline cervical/thoracic/lumbar ttp  neuro: AAOX 3 cn iii-xii intact strength 5/5 bl walks with a walker/cane at baseline    -->pt notes mild ha at this time improved after taking aleve at home; will follow up ct head/  shunt series reassess

## 2023-09-28 NOTE — ED PROVIDER NOTE - PROGRESS NOTE DETAILS
Bedside report received from MAYI Godinez. Assumed care of patient. Continuous infusions verified during bedside report. Call light within reach and bed alarm on.    remains stable and neurovascularly intact. ambulating in ED. Conversation had with patient regarding results of lab work and imaging. Patient agrees with plan for discharge at this time. Patient agrees to comply with follow up to primary care doctor/specalist. Return to ED precautions and discharge instructions discussed with patient with verbal understanding. -DO Reggie

## 2023-09-28 NOTE — ED ADULT TRIAGE NOTE - CHIEF COMPLAINT QUOTE
c/o of head pressure around 8 pm pt have a shunt in brain. hx of shunt readjusted in July. took 1/2 of aleve. Not on blood thinners. Denies blurry vision

## 2023-09-28 NOTE — ED PROVIDER NOTE - NS ED MD DISPO DISCHARGE
[FreeTextEntry1] : Status post abdominal perineal resection for perforated anus/rectum after radiation therapy for prostate and anal cancer. Patient progressing well. Tolerating diet. Stoma functioning. Draining left thigh wound with decreased output\par \par 4/26/23-58 yo male s/p APR for anal cancer w/ radiation x2.  Pt with intermittent abdominal pain.  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  Pt recently presented to ER and was noted to have UTI.  Pt had CT which otherwise w/ wnl.  Reports constipation. Takes miralax daily.  no fevers or chills no n/v.  No aggravating factors\par \par July 18, 2023-patient with persistent intermittent abdominal pain of unclear origin. Patient with bilateral stent placement for ureteral strictures and bilateral hydronephrosis. No fevers or chills currently no nausea or vomiting. Tolerating diet stoma functioning without event no specific aggravating factors Home

## 2023-09-28 NOTE — ED ADULT NURSE NOTE - CAS ELECT INFOMATION PROVIDED
patient verbalizes understanding of and agrees with discharge instructions.  patient ambulated off unit without incident./DC instructions

## 2023-09-28 NOTE — ED PROVIDER NOTE - NSFOLLOWUPINSTRUCTIONS_ED_ALL_ED_FT
follow up with your neurologist/neurosurgeon within 48 hours    Please follow-up with your primary care doctor.  Please call for an appointment in the next 48 hours but if you cannot follow-up with your primary care doctor please return to the Emergency Department for any urgent issues.    You were given a copy of the tests performed today.  Please bring the results with you and review them with your primary care doctor.    If you have any worsening of symptoms or any other concerns please return to the Emergency Department immediately.    Please continue taking your home medications as directed.      Ventriculoperitoneal Shunt Placement for Hydrocephalus in Adults    WHAT YOU NEED TO KNOW:    What do I need to know about ventriculoperitoneal shunt placement? Ventriculoperitoneal () shunt placement is surgery to help remove extra cerebrospinal fluid (CSF) in your brain.   Shunt    How do I prepare for surgery? Your surgeon will talk to you about how to prepare for surgery. He or she may tell you not to eat or drink anything after midnight on the day of your surgery. He or she will tell you what medicines to take or not take on the day of your surgery. You may be given an antibiotic through your IV to help prevent a bacterial infection. Arrange to have someone drive you home and stay with you to make sure you are okay.    What will happen during surgery?    You will be given general anesthesia to keep you asleep and free from pain during surgery. Two small incisions will be made. One incision will be in your head and the other in your abdomen. One end of the shunt will be placed into your brain. This end of the shunt will be connected to a valve that controls the amount of CSF that goes through the shunt. You may need another incision behind your ear to create a pocket for the valve. Near the valve is a reservoir or pump. The pump can be pressed by the surgeon to check how the shunt is working.    Connected to the valve on the shunt is a catheter (tube). This catheter is tunneled under your skin, from your head to your abdomen. The extra CSF will empty into your abdomen. It will be quickly absorbed into your blood. Your incisions may be closed with staples or stitches. Bandages may be placed over the incisions.  What will happen after surgery? Providers will monitor you closely to make sure the shunt is working properly. Your intracranial pressure (ICP) will be checked. The ICP is the pressure inside your skull. Your provider may need to change the settings on your valve if your ICP is too high or too low. You may need imaging tests to check the placement of the shunt and make sure it is not blocked. A sample of your CSF may be tested to check for infection and inflammation.    What are the risks of surgery? You may get a headache that is worse when you sit or stand. Your tube may become blocked or move out of place, and you may need another surgery. You could have bleeding into your brain and you may need surgery to treat it. You may get an infection at the incision site or a more serious infection in your brain. There may be damage to your brain or the organs in your abdomen.    CARE AGREEMENT:    You have the right to help plan your care. Learn about your health condition and how it may be treated. Discuss treatment options with your healthcare providers to decide what care you want to receive. You always have the right to refuse treatment.

## 2023-09-28 NOTE — ED PROVIDER NOTE - PATIENT PORTAL LINK FT
You can access the FollowMyHealth Patient Portal offered by  by registering at the following website: [email or web address]. By joining [employer]’s FollowMyHealth portal, you will also be able to view your health information using other applications (apps) compatible with our system. You can access the FollowMyHealth Patient Portal offered by [facility] by registering at the following website: [email or web address]. By joining [employer]’s FollowMyHealth portal, you will also be able to view your health information using other applications (apps) compatible with our system.

## 2023-10-07 ENCOUNTER — APPOINTMENT (OUTPATIENT)
Dept: CT IMAGING | Facility: CLINIC | Age: 78
End: 2023-10-07

## 2023-10-23 ENCOUNTER — RX RENEWAL (OUTPATIENT)
Age: 78
End: 2023-10-23

## 2023-11-03 ENCOUNTER — APPOINTMENT (OUTPATIENT)
Dept: UROGYNECOLOGY | Facility: CLINIC | Age: 78
End: 2023-11-03
Payer: MEDICARE

## 2023-11-03 VITALS
BODY MASS INDEX: 27.97 KG/M2 | WEIGHT: 152 LBS | HEIGHT: 62 IN | SYSTOLIC BLOOD PRESSURE: 159 MMHG | DIASTOLIC BLOOD PRESSURE: 77 MMHG

## 2023-11-03 DIAGNOSIS — N89.8 OTHER SPECIFIED NONINFLAMMATORY DISORDERS OF VAGINA: ICD-10-CM

## 2023-11-03 DIAGNOSIS — R32 UNSPECIFIED URINARY INCONTINENCE: ICD-10-CM

## 2023-11-03 PROCEDURE — 99213 OFFICE O/P EST LOW 20 MIN: CPT

## 2023-11-03 PROCEDURE — 51798 US URINE CAPACITY MEASURE: CPT

## 2023-11-06 LAB
CANDIDA VAG CYTO: NOT DETECTED
G VAGINALIS+PREV SP MTYP VAG QL MICRO: NOT DETECTED
T VAGINALIS VAG QL WET PREP: NOT DETECTED

## 2023-11-07 ENCOUNTER — APPOINTMENT (OUTPATIENT)
Dept: FAMILY MEDICINE | Facility: CLINIC | Age: 78
End: 2023-11-07

## 2023-11-21 LAB
25(OH)D3 SERPL-MCNC: 33.7 NG/ML
ALBUMIN SERPL ELPH-MCNC: 4.4 G/DL
ALP BLD-CCNC: 62 U/L
ALT SERPL-CCNC: 9 U/L
ANION GAP SERPL CALC-SCNC: 9 MMOL/L
AST SERPL-CCNC: 9 U/L
BILIRUB SERPL-MCNC: 0.5 MG/DL
BUN SERPL-MCNC: 27 MG/DL
CALCIUM SERPL-MCNC: 10.5 MG/DL
CHLORIDE SERPL-SCNC: 105 MMOL/L
CHOLEST SERPL-MCNC: 170 MG/DL
CO2 SERPL-SCNC: 27 MMOL/L
CREAT SERPL-MCNC: 0.82 MG/DL
EGFR: 74 ML/MIN/1.73M2
GLUCOSE SERPL-MCNC: 81 MG/DL
HDLC SERPL-MCNC: 78 MG/DL
LDLC SERPL CALC-MCNC: 75 MG/DL
NONHDLC SERPL-MCNC: 91 MG/DL
POTASSIUM SERPL-SCNC: 4.6 MMOL/L
PROT SERPL-MCNC: 6.3 G/DL
SODIUM SERPL-SCNC: 142 MMOL/L
TRIGL SERPL-MCNC: 86 MG/DL

## 2023-11-27 ENCOUNTER — APPOINTMENT (OUTPATIENT)
Dept: FAMILY MEDICINE | Facility: CLINIC | Age: 78
End: 2023-11-27
Payer: MEDICARE

## 2023-11-27 VITALS
BODY MASS INDEX: 27.79 KG/M2 | HEART RATE: 70 BPM | WEIGHT: 151 LBS | OXYGEN SATURATION: 98 % | DIASTOLIC BLOOD PRESSURE: 80 MMHG | SYSTOLIC BLOOD PRESSURE: 122 MMHG | HEIGHT: 62 IN

## 2023-11-27 DIAGNOSIS — Z00.00 ENCOUNTER FOR GENERAL ADULT MEDICAL EXAMINATION W/OUT ABNORMAL FINDINGS: ICD-10-CM

## 2023-11-27 DIAGNOSIS — R07.81 PLEURODYNIA: ICD-10-CM

## 2023-11-27 PROCEDURE — G0439: CPT

## 2023-11-27 PROCEDURE — 36415 COLL VENOUS BLD VENIPUNCTURE: CPT

## 2023-11-27 PROCEDURE — G0008: CPT

## 2023-11-27 PROCEDURE — 99213 OFFICE O/P EST LOW 20 MIN: CPT | Mod: 25

## 2023-11-27 PROCEDURE — 90662 IIV NO PRSV INCREASED AG IM: CPT

## 2023-11-28 LAB
BASOPHILS # BLD AUTO: 0.05 K/UL
BASOPHILS NFR BLD AUTO: 1.1 %
EOSINOPHIL # BLD AUTO: 0.1 K/UL
EOSINOPHIL NFR BLD AUTO: 2.2 %
ESTIMATED AVERAGE GLUCOSE: 97 MG/DL
HBA1C MFR BLD HPLC: 5 %
HCT VFR BLD CALC: 40.7 %
HGB BLD-MCNC: 13.1 G/DL
IMM GRANULOCYTES NFR BLD AUTO: 0.2 %
LYMPHOCYTES # BLD AUTO: 0.73 K/UL
LYMPHOCYTES NFR BLD AUTO: 16 %
MAN DIFF?: NORMAL
MCHC RBC-ENTMCNC: 30 PG
MCHC RBC-ENTMCNC: 32.2 GM/DL
MCV RBC AUTO: 93.3 FL
MONOCYTES # BLD AUTO: 0.38 K/UL
MONOCYTES NFR BLD AUTO: 8.3 %
NEUTROPHILS # BLD AUTO: 3.29 K/UL
NEUTROPHILS NFR BLD AUTO: 72.2 %
PLATELET # BLD AUTO: 215 K/UL
RBC # BLD: 4.36 M/UL
RBC # FLD: 12.8 %
T4 FREE SERPL-MCNC: 1.2 NG/DL
TSH SERPL-ACNC: 0.93 UIU/ML
WBC # FLD AUTO: 4.56 K/UL

## 2023-12-25 ENCOUNTER — RX RENEWAL (OUTPATIENT)
Age: 78
End: 2023-12-25

## 2023-12-28 RX ORDER — ATORVASTATIN CALCIUM 10 MG/1
10 TABLET, FILM COATED ORAL DAILY
Qty: 90 | Refills: 0 | Status: ACTIVE | COMMUNITY
Start: 2019-04-22 | End: 1900-01-01

## 2024-01-29 RX ORDER — IRBESARTAN 75 MG/1
75 TABLET ORAL DAILY
Qty: 90 | Refills: 1 | Status: ACTIVE | COMMUNITY
Start: 2019-05-22 | End: 1900-01-01

## 2024-01-30 ENCOUNTER — APPOINTMENT (OUTPATIENT)
Dept: FAMILY MEDICINE | Facility: CLINIC | Age: 79
End: 2024-01-30
Payer: MEDICARE

## 2024-01-30 VITALS
OXYGEN SATURATION: 96 % | HEART RATE: 67 BPM | SYSTOLIC BLOOD PRESSURE: 132 MMHG | WEIGHT: 147 LBS | BODY MASS INDEX: 27.05 KG/M2 | DIASTOLIC BLOOD PRESSURE: 84 MMHG | HEIGHT: 62 IN

## 2024-01-30 DIAGNOSIS — M47.816 SPONDYLOSIS W/OUT MYELOPATHY OR RADICULOPATHY, LUMBAR REGION: ICD-10-CM

## 2024-01-30 DIAGNOSIS — M48.062 SPINAL STENOSIS, LUMBAR REGION WITH NEUROGENIC CLAUDICATION: ICD-10-CM

## 2024-01-30 DIAGNOSIS — G89.29 DORSALGIA, UNSPECIFIED: ICD-10-CM

## 2024-01-30 DIAGNOSIS — Z82.49 FAMILY HISTORY OF ISCHEMIC HEART DISEASE AND OTHER DISEASES OF THE CIRCULATORY SYSTEM: ICD-10-CM

## 2024-01-30 DIAGNOSIS — M54.9 DORSALGIA, UNSPECIFIED: ICD-10-CM

## 2024-01-30 DIAGNOSIS — E78.5 HYPERLIPIDEMIA, UNSPECIFIED: ICD-10-CM

## 2024-01-30 PROCEDURE — 99214 OFFICE O/P EST MOD 30 MIN: CPT

## 2024-01-30 PROCEDURE — 36415 COLL VENOUS BLD VENIPUNCTURE: CPT

## 2024-01-30 RX ORDER — MULTIVIT-MIN/IRON/FOLIC ACID/K 18-600-40
CAPSULE ORAL
Refills: 0 | Status: ACTIVE | COMMUNITY

## 2024-02-04 PROBLEM — M54.9 CHRONIC BACK PAIN, UNSPECIFIED BACK LOCATION, UNSPECIFIED BACK PAIN LATERALITY: Status: ACTIVE | Noted: 2024-02-04

## 2024-02-04 PROBLEM — M47.816 LUMBAR SPONDYLOSIS: Status: ACTIVE | Noted: 2018-09-29

## 2024-02-04 PROBLEM — M48.062 LUMBAR STENOSIS WITH NEUROGENIC CLAUDICATION: Status: ACTIVE | Noted: 2020-10-20

## 2024-02-04 NOTE — PLAN
[FreeTextEntry1] : OPTIMIZED MEDICALLY FOR PROPOSED SURGICAL PROCEDURE PENDING PRE-OPERATIVE TESTING REVIEW AND CARDIOLOGY CLEARANCE GI/DVT PROPHYLAXIS PER SURGERY AVOIDANCE OF NSAIDS, VITAMINS AND ASPIRIN CONTAINING PRODUCTS PRIOR TO SURGERY BLOOD PRESSURE CONTROLLED ON IRBESARTAN 75 MG DAILY CALL WITH ANY QUESTIONS, CONCERNS OR CHANGES PRIOR TO PROCEDURE SUPPORT PROVIDED FOLLOW-UP POST-OPERATIVELY

## 2024-02-04 NOTE — PHYSICAL EXAM
[No Acute Distress] : no acute distress [Well Nourished] : well nourished [Well-Appearing] : well-appearing [Normal Sclera/Conjunctiva] : normal sclera/conjunctiva [PERRL] : pupils equal round and reactive to light [Normal Outer Ear/Nose] : the outer ears and nose were normal in appearance [Normal Oropharynx] : the oropharynx was normal [Normal TMs] : both tympanic membranes were normal [No Respiratory Distress] : no respiratory distress  [No Accessory Muscle Use] : no accessory muscle use [Clear to Auscultation] : lungs were clear to auscultation bilaterally [Normal Rate] : normal rate  [Regular Rhythm] : with a regular rhythm [Normal S1, S2] : normal S1 and S2 [Soft] : abdomen soft [Non Tender] : non-tender [Normal Bowel Sounds] : normal bowel sounds [No Joint Swelling] : no joint swelling [No Rash] : no rash [Coordination Grossly Intact] : coordination grossly intact [No Focal Deficits] : no focal deficits [Normal Gait] : normal gait [Deep Tendon Reflexes (DTR)] : deep tendon reflexes were 2+ and symmetric [Speech Grossly Normal] : speech grossly normal [Memory Grossly Normal] : memory grossly normal [Normal Mood] : the mood was normal [de-identified] : WALKER FOR SUPPORT

## 2024-02-04 NOTE — HISTORY OF PRESENT ILLNESS
[No Pertinent Cardiac History] : no history of aortic stenosis, atrial fibrillation, coronary artery disease, recent myocardial infarction, or implantable device/pacemaker [No Pertinent Pulmonary History] : no history of asthma, COPD, sleep apnea, or smoking [No Adverse Anesthesia Reaction] : no adverse anesthesia reaction in self or family member [(Patient denies any chest pain, claudication, dyspnea on exertion, orthopnea, palpitations or syncope)] : Patient denies any chest pain, claudication, dyspnea on exertion, orthopnea, palpitations or syncope [Chronic Anticoagulation] : no chronic anticoagulation [Chronic Kidney Disease] : no chronic kidney disease [Diabetes] : no diabetes [FreeTextEntry1] : RADIOFREQUENCY ABLATION [FreeTextEntry2] : FEBRUARY 22, 2024 [FreeTextEntry3] : DR. TOM DON ([facility]) [FreeTextEntry4] : MS. ROYAL IS A PLEASANT 77 YO PRESENTING FOR MEDICAL CLEARANCE FOR UPCOMING BACK PROCEDURE.  IS BEING DONE FOR LOW BACK PAIN.  TRIED CONSERVATIVE MANAGEMENT WITH INJECTIONS.  FEELING WELL TODAY.  NO HISTORY OF MI, STROKE OR SEIZURE.  NO PERSONAL OR FAMILY HISTORY OF BLOOD DISORDERS.  DENIES EASY BLEEDING OR BRUISING.  IS ABLE TO WALK MULTIPLE BLOCKS AND GO UP MULTIPLE FLIGHTS OF STAIRS WITHOUT DIFFICULTY.   HAS HAD NO HEADACHE, DIZZINESS, CHEST PAIN, SHORTNESS OF BREATH, GI OR URINARY COMPLAINTS.  NO OTHER COMPLAINTS TODAY.  HISTORY OF NORMAL PRESSURE HYDROCEPHALUS S/P  SHUNT, HYPERTENSION, , HYPERLIPIDEMIA, ANXIETY AND VITAMIN D INSUFFICIENCY.   [FreeTextEntry5] :  SHUNT [FreeTextEntry7] : STRESS TEST 2019 ECHOCARDIOGRAM 2019 IS SEEING CARDIOLOGY ON THE 1ST FOR CARDIAC CLEARANCE - DR. DUNN

## 2024-02-08 LAB
APTT BLD: 32.1 SEC
BASOPHILS # BLD AUTO: 0.02 K/UL
BASOPHILS NFR BLD AUTO: 0.5 %
EOSINOPHIL # BLD AUTO: 0.07 K/UL
EOSINOPHIL NFR BLD AUTO: 1.6 %
HCT VFR BLD CALC: 42.5 %
HGB BLD-MCNC: 14 G/DL
IMM GRANULOCYTES NFR BLD AUTO: 0.2 %
INR PPP: 0.85 RATIO
LYMPHOCYTES # BLD AUTO: 0.88 K/UL
LYMPHOCYTES NFR BLD AUTO: 20.4 %
MAN DIFF?: NORMAL
MCHC RBC-ENTMCNC: 30 PG
MCHC RBC-ENTMCNC: 32.9 GM/DL
MCV RBC AUTO: 91.2 FL
MONOCYTES # BLD AUTO: 0.43 K/UL
MONOCYTES NFR BLD AUTO: 10 %
NEUTROPHILS # BLD AUTO: 2.91 K/UL
NEUTROPHILS NFR BLD AUTO: 67.3 %
PLATELET # BLD AUTO: 238 K/UL
PT BLD: 9.7 SEC
RBC # BLD: 4.66 M/UL
RBC # FLD: 12.8 %
WBC # FLD AUTO: 4.32 K/UL

## 2024-02-09 LAB
ALBUMIN SERPL ELPH-MCNC: 4.7 G/DL
ALP BLD-CCNC: 66 U/L
ALT SERPL-CCNC: 11 U/L
ANION GAP SERPL CALC-SCNC: 11 MMOL/L
AST SERPL-CCNC: 13 U/L
BILIRUB SERPL-MCNC: 0.5 MG/DL
BUN SERPL-MCNC: 22 MG/DL
CALCIUM SERPL-MCNC: 11.5 MG/DL
CHLORIDE SERPL-SCNC: 106 MMOL/L
CO2 SERPL-SCNC: 23 MMOL/L
CREAT SERPL-MCNC: 0.77 MG/DL
EGFR: 79 ML/MIN/1.73M2
GLUCOSE SERPL-MCNC: 86 MG/DL
POTASSIUM SERPL-SCNC: 4.8 MMOL/L
PROT SERPL-MCNC: 6.6 G/DL
SODIUM SERPL-SCNC: 140 MMOL/L

## 2024-02-27 ENCOUNTER — APPOINTMENT (OUTPATIENT)
Dept: FAMILY MEDICINE | Facility: CLINIC | Age: 79
End: 2024-02-27
Payer: MEDICARE

## 2024-02-27 VITALS
BODY MASS INDEX: 27.42 KG/M2 | DIASTOLIC BLOOD PRESSURE: 84 MMHG | HEART RATE: 66 BPM | SYSTOLIC BLOOD PRESSURE: 136 MMHG | HEIGHT: 62 IN | OXYGEN SATURATION: 96 % | WEIGHT: 149 LBS

## 2024-02-27 DIAGNOSIS — E83.52 HYPERCALCEMIA: ICD-10-CM

## 2024-02-27 PROCEDURE — 99213 OFFICE O/P EST LOW 20 MIN: CPT

## 2024-02-28 LAB
25(OH)D3 SERPL-MCNC: 33.1 NG/ML
ALBUMIN SERPL ELPH-MCNC: 4.4 G/DL
ALP BLD-CCNC: 64 U/L
ALT SERPL-CCNC: 15 U/L
ANION GAP SERPL CALC-SCNC: 11 MMOL/L
AST SERPL-CCNC: 14 U/L
BILIRUB SERPL-MCNC: 0.3 MG/DL
BUN SERPL-MCNC: 21 MG/DL
CALCIUM SERPL-MCNC: 11.1 MG/DL
CHLORIDE SERPL-SCNC: 107 MMOL/L
CO2 SERPL-SCNC: 24 MMOL/L
CREAT SERPL-MCNC: 0.78 MG/DL
EGFR: 78 ML/MIN/1.73M2
GLUCOSE SERPL-MCNC: 106 MG/DL
POTASSIUM SERPL-SCNC: 4.6 MMOL/L
PROT SERPL-MCNC: 6.8 G/DL
SODIUM SERPL-SCNC: 141 MMOL/L

## 2024-03-01 LAB
CALCIUM SERPL-MCNC: 11.1 MG/DL
PARATHYROID HORMONE INTACT: 115 PG/ML

## 2024-03-03 PROBLEM — E83.52 CALCIUM BLOOD INCREASED: Status: ACTIVE | Noted: 2024-02-27

## 2024-03-03 NOTE — PHYSICAL EXAM
[No Acute Distress] : no acute distress [Well Nourished] : well nourished [Well-Appearing] : well-appearing [Normal Sclera/Conjunctiva] : normal sclera/conjunctiva [PERRL] : pupils equal round and reactive to light [No Respiratory Distress] : no respiratory distress  [No Accessory Muscle Use] : no accessory muscle use [Clear to Auscultation] : lungs were clear to auscultation bilaterally [Normal Rate] : normal rate  [Regular Rhythm] : with a regular rhythm [Normal S1, S2] : normal S1 and S2 [No Joint Swelling] : no joint swelling

## 2024-03-03 NOTE — HISTORY OF PRESENT ILLNESS
[FreeTextEntry1] : CHECK-UP [de-identified] : MS. ROYAL IS A PLEASANT 77 YO PRESENTING FOR FOLLOWUP.  ABLATION PROCEDURE HAS BEEN POSTPONED A COUPLE OF TIMES.  WILL BE FOLLOWING UP FOR MCL CLOSER TO PROCEDURE.  HAS BEEN TAKING CALCIUM SUPPLEMENT BY ACCIDENT AND DID NOT HOLD IT.  PRIOR ENDOCRINOLOGY EVALUATION.  HISTORY OF NORMAL PRESSURE HYDROCEPHALUS S/P  SHUNT, HYPERTENSION, HYPERLIPIDEMIA, ANXIETY AND VITAMIN D INSUFFICIENCY.

## 2024-03-03 NOTE — PLAN
[FreeTextEntry1] : WILL HOLD CALCIUM FOR NOW RECHECK LEVELS WITH VITAMIN D AND PTH FOLLOW-UP ENDOCRINOLOGY FOLLOW-UP FOR MCL CLOSER TO SURGERY DATE BLOOD PRESSURE CONTROLLED CALL WITH ANY QUESTIONS, CONCERNS OR CHANGES

## 2024-03-13 ENCOUNTER — APPOINTMENT (OUTPATIENT)
Dept: FAMILY MEDICINE | Facility: CLINIC | Age: 79
End: 2024-03-13
Payer: MEDICARE

## 2024-03-13 VITALS
HEART RATE: 72 BPM | WEIGHT: 148 LBS | OXYGEN SATURATION: 97 % | BODY MASS INDEX: 29.84 KG/M2 | DIASTOLIC BLOOD PRESSURE: 74 MMHG | HEIGHT: 59 IN | SYSTOLIC BLOOD PRESSURE: 136 MMHG

## 2024-03-13 DIAGNOSIS — I10 ESSENTIAL (PRIMARY) HYPERTENSION: ICD-10-CM

## 2024-03-13 DIAGNOSIS — Z01.818 ENCOUNTER FOR OTHER PREPROCEDURAL EXAMINATION: ICD-10-CM

## 2024-03-13 DIAGNOSIS — G91.2 (IDIOPATHIC) NORMAL PRESSURE HYDROCEPHALUS: ICD-10-CM

## 2024-03-13 PROCEDURE — 99214 OFFICE O/P EST MOD 30 MIN: CPT

## 2024-03-13 RX ORDER — AMLODIPINE BESYLATE 5 MG/1
5 TABLET ORAL
Refills: 0 | Status: ACTIVE | COMMUNITY

## 2024-03-15 PROBLEM — G91.2 NORMAL PRESSURE HYDROCEPHALUS: Status: ACTIVE | Noted: 2018-04-06

## 2024-03-15 PROBLEM — Z01.818 PREOPERATIVE CLEARANCE: Status: ACTIVE | Noted: 2021-03-02

## 2024-03-15 NOTE — PLAN
[FreeTextEntry1] : OPTIMIZED MEDICALLY FOR PROPOSED SURGICAL PROCEDURE PRE-OPERATIVE TESTING REVIEWED CARDIOLOGY CLEARANCE PREVIOUSLY OBTAINED FEBRUARY 2024 GI/DVT PROPHYLAXIS PER SURGERY AVOIDANCE OF NSAIDS, VITAMINS AND ASPIRIN CONTAINING PRODUCTS PRIOR TO SURGERY BLOOD PRESSURE CONTROLLED ON IRBESARTAN 75 MG DAILY CALL WITH ANY QUESTIONS, CONCERNS OR CHANGES PRIOR TO PROCEDURE SUPPORT PROVIDED FOLLOW-UP POST-OPERATIVELY WILL BE FOLLOWING UP WITH HER ENDOCRINOLOGIST FOR ELEVATED CALCIUM

## 2024-03-15 NOTE — HISTORY OF PRESENT ILLNESS
[No Pertinent Cardiac History] : no history of aortic stenosis, atrial fibrillation, coronary artery disease, recent myocardial infarction, or implantable device/pacemaker [No Pertinent Pulmonary History] : no history of asthma, COPD, sleep apnea, or smoking [No Adverse Anesthesia Reaction] : no adverse anesthesia reaction in self or family member [(Patient denies any chest pain, claudication, dyspnea on exertion, orthopnea, palpitations or syncope)] : Patient denies any chest pain, claudication, dyspnea on exertion, orthopnea, palpitations or syncope [Chronic Kidney Disease] : no chronic kidney disease [Chronic Anticoagulation] : no chronic anticoagulation [Diabetes] : no diabetes [FreeTextEntry1] :  SHUNT REPLACEMENT [FreeTextEntry2] : march 18, 2024 [FreeTextEntry3] : DR. FUENTES [FreeTextEntry4] : MS. ROYAL IS A PLEASANT 77 YO PRESENTING FOR MEDICAL CLEARANCE FOR UPCOMING SHUNT REPLACEMENT. FEELING WELL TODAY. NO HISTORY OF MI, STROKE OR SEIZURE. NO PERSONAL OR FAMILY HISTORY OF BLOOD DISORDERS. DENIES EASY BLEEDING OR BRUISING.  HAS HAD NO HEADACHE, DIZZINESS, CHEST PAIN, SHORTNESS OF BREATH, GI OR URINARY COMPLAINTS. NO OTHER COMPLAINTS TODAY. HISTORY OF NORMAL PRESSURE HYDROCEPHALUS S/P  SHUNT, HYPERTENSION, HYPERLIPIDEMIA, ANXIETY AND VITAMIN D INSUFFICIENCY.   [FreeTextEntry5] :  SHUNT [FreeTextEntry7] : CARDIOLOGY CLEARANCE COMPLETED FEBRUARY 29, 2024

## 2024-03-15 NOTE — PHYSICAL EXAM
[No Acute Distress] : no acute distress [Well Nourished] : well nourished [Well-Appearing] : well-appearing [Normal Sclera/Conjunctiva] : normal sclera/conjunctiva [PERRL] : pupils equal round and reactive to light [Normal Outer Ear/Nose] : the outer ears and nose were normal in appearance [Normal Oropharynx] : the oropharynx was normal [Normal TMs] : both tympanic membranes were normal [No Lymphadenopathy] : no lymphadenopathy [Supple] : supple [No Accessory Muscle Use] : no accessory muscle use [No Respiratory Distress] : no respiratory distress  [Clear to Auscultation] : lungs were clear to auscultation bilaterally [Normal Rate] : normal rate  [Normal S1, S2] : normal S1 and S2 [Regular Rhythm] : with a regular rhythm [Soft] : abdomen soft [Non Tender] : non-tender [Normal Bowel Sounds] : normal bowel sounds [No Joint Swelling] : no joint swelling [No Rash] : no rash [Coordination Grossly Intact] : coordination grossly intact [No Focal Deficits] : no focal deficits [Deep Tendon Reflexes (DTR)] : deep tendon reflexes were 2+ and symmetric [Memory Grossly Normal] : memory grossly normal [Speech Grossly Normal] : speech grossly normal [Normal Mood] : the mood was normal [de-identified] : walker for support

## 2024-03-19 ENCOUNTER — NON-APPOINTMENT (OUTPATIENT)
Age: 79
End: 2024-03-19

## 2024-03-20 NOTE — PHYSICAL EXAM
Chief Complaint  Heartburn and Abdominal Pain    Margaret Spence is a 22 y.o. female who presents to [facility] for follow up.     History of present Illness  Patient presents to the office for follow up with history of alpha gal, C. Diff, nausea, vomiting, epigastric pain, and heartburn. Reflux is well controlled with Omeprazole 40mg in the morning and Pepcid 40mg at night. Denies nausea, vomiting, epigastric pain, and dysphagia. Only uses Colestid intermittent when having diarrhea. Denies change in bowel habits, melena and hematochezia. She has upcoming appointment with [facility] 24.     GES 23 - normal    EGD/colonoscopy 2023 by Dr. Kim-normal esophagus, stomach, and duodenum.  Normal esophagus, stomach, and duodenum biopsies.  Normal mucosa throughout colon.    [facility] 23 - not consistent with IBD    CT enterography 2023 -   1. Fluid within the stomach, proximal small bowel and significant portions of the colon noted   without evidence of obstruction.  Findings suggest a nonspecific gastroenteritis.  No focal wall   thickening or inflammatory change noted.  Please correlate with patient's symptoms  2. The appendix is visualized and appears within normal limits  3. No acute intra-abdominal or intrapelvic abnormality is otherwise noted      Past Medical History:   Diagnosis Date    Allergy to alpha-gal     GERD (gastroesophageal reflux disease)     History of Clostridioides difficile colitis     treated no further symptoms 2022    Nausea and vomiting 2022       Past Surgical History:   Procedure Laterality Date    ADENOIDECTOMY       SECTION      CHOLECYSTECTOMY      COLONOSCOPY N/A 2022    Procedure: COLONOSCOPY;  Surgeon: Josr Kim MD;  Location: [facility] ENDOSCOPY;  Service: Gastroenterology;  Laterality: N/A;  NORMAL TI    COLONOSCOPY N/A 2023    Procedure: COLONOSCOPY;  Surgeon:  Josr Kim MD;  Location: [facility] ENDOSCOPY;  Service: Gastroenterology;  Laterality: N/A;  NORMAL COLONOSCOPY    ENDOSCOPY N/A 08/04/2022    Procedure: ESOPHAGOGASTRODUODENOSCOPY;  Surgeon: Josr Kim MD;  Location: [facility] ENDOSCOPY;  Service: Gastroenterology;  Laterality: N/A;    ENDOSCOPY N/A 8/23/2023    Procedure: ESOPHAGOGASTRODUODENOSCOPY WITH BIOPSIES;  Surgeon: Josr Kim MD;  Location: [facility] ENDOSCOPY;  Service: Gastroenterology;  Laterality: N/A;  NORMAL ENDOSCOPY    FINGER SURGERY Right     pinky finger    TONSILLECTOMY      UPPER GASTROINTESTINAL ENDOSCOPY           Current Outpatient Medications:     colestipol (COLESTID) 1 g tablet, Take 1 tablet by mouth 2 (Two) Times a Day for 90 days., Disp: 180 tablet, Rfl: 2    drospirenone-ethinyl estradiol (CRYS,GIANVI) 3-0.02 MG per tablet, Take 1 tablet by mouth Daily., Disp: 84 tablet, Rfl: 3    escitalopram (Lexapro) 10 MG tablet, Take 1 tablet by mouth Daily., Disp: , Rfl:     famotidine (PEPCID) 20 MG tablet, Take 1 tablet by mouth At Night As Needed for Heartburn., Disp: 90 tablet, Rfl: 2    fexofenadine (ALLEGRA) 60 MG tablet, Take 1 tablet by mouth Daily., Disp: , Rfl:     Lexapro 20 MG tablet, Take 0.5 tablets by mouth Daily., Disp: , Rfl:     omeprazole (priLOSEC) 40 MG capsule, TAKE ONE CAPSULE BY MOUTH EVERY DAY, Disp: 30 capsule, Rfl: 2    ondansetron ODT (ZOFRAN-ODT) 4 MG disintegrating tablet, Take  by mouth., Disp: , Rfl:     potassium chloride (KLOR-CON M20) 20 MEQ CR tablet, Take  by mouth., Disp: , Rfl:     Scopolamine 1 MG/3DAYS patch, apply one PATCH TO SKIN behind THE ear AS NEEDED every THREE DAYS, Disp: , Rfl:     dexAMETHasone (DECADRON) 4 MG tablet, TAKE TWO TABLETS BY MOUTH EVERY DAY FOR nausea prevention (Patient not taking: Reported on 3/20/2024), Disp: , Rfl:     doxycycline (VIBRAMYCIN) 50 MG capsule, 1 capsule Daily. (Patient not taking: Reported on 3/20/2024), Disp: , Rfl:     hydrocortisone  "1 % cream, Apply 1 application  topically to the appropriate area as directed 2 (Two) Times a Day. (Patient not taking: Reported on 3/20/2024), Disp: , Rfl:     metoclopramide (REGLAN) 5 MG tablet, Take 2 tablets by mouth 3 (Three) Times a Day As Needed (nausea). (Patient not taking: Reported on 3/20/2024), Disp: 20 tablet, Rfl: 0    ondansetron (ZOFRAN) 4 MG tablet, Take 1 tablet by mouth Every 6 (Six) Hours As Needed for Nausea or Vomiting. (Patient not taking: Reported on 3/20/2024), Disp: 30 tablet, Rfl: 1     Allergies   Allergen Reactions    Promethazine Palpitations    Alpha-Gal GI Intolerance    Sunscreens Unknown - Low Severity       Family History   Problem Relation Age of Onset    Cervical cancer Mother     Colon cancer Neg Hx     Malig Hyperthermia Neg Hx     Breast cancer Neg Hx     Diabetes Neg Hx     Hypertension Neg Hx     Stroke Neg Hx     Ovarian cancer Neg Hx     Uterine cancer Neg Hx     Melanoma Neg Hx     Prostate cancer Neg Hx         Social History     Social History Narrative    Not on file       Objective       Vital Signs:   /57 (BP Location: Right arm, Patient Position: Sitting, Cuff Size: Adult)   Pulse 69   Ht 157.5 cm (62.01\")   Wt 63.1 kg (139 lb 1.6 oz)   SpO2 100%   BMI 25.44 kg/m²       Physical Exam  Constitutional:       Appearance: Normal appearance. She is normal weight.   HENT:      Head: Normocephalic and atraumatic.      Nose: Nose normal.   Pulmonary:      Effort: Pulmonary effort is normal.   Skin:     General: Skin is warm and dry.   Neurological:      Mental Status: She is alert and oriented to person, place, and time. Mental status is at baseline.   Psychiatric:         Mood and Affect: Mood normal.         Behavior: Behavior normal.         Thought Content: Thought content normal.         Judgment: Judgment normal.         Result Review :       CBC w/diff          7/17/2023    17:00 1/22/2024    08:57   CBC w/Diff   WBC 6.86  11.61    RBC 4.81  4.89  "   Hemoglobin 13.5  14.0    Hematocrit 39.3  40.8    MCV 81.7  83.4    MCH 28.1  28.6    MCHC 34.4  34.3    RDW 12.8  13.1    Platelets 310  289    Neutrophil Rel %  80.1    Immature Granulocyte Rel %  0.6    Lymphocyte Rel %  14.8    Monocyte Rel %  4.0    Eosinophil Rel %  0.2    Basophil Rel %  0.3      CMP          7/17/2023    17:00 1/22/2024    08:57   CMP   Glucose 90  136    BUN 4  9    Creatinine 0.63  0.84    EGFR 130.4  101.5    Sodium 138  138    Potassium 3.6  4.1    Chloride 104  102    Calcium 9.3  10.0    Total Protein 7.0  7.7    Albumin 4.2  4.7    Globulin 2.8  3.0    Total Bilirubin 0.4  0.7    Alkaline Phosphatase 86  62    AST (SGOT) 21  71    ALT (SGPT) 27  41    Albumin/Globulin Ratio 1.5  1.6    BUN/Creatinine Ratio 6.3  10.7    Anion Gap 12.6  18.9                    Assessment and Plan    Diagnoses and all orders for this visit:    1. Gastroesophageal reflux disease, unspecified whether esophagitis present (Primary)    2. Irritable bowel syndrome with diarrhea    22-year-old patient presents to the office for follow up with history of alpha gal, C. Diff, nausea, vomiting, epigastric pain, and heartburn. Reflux is well controlled with Omeprazole 40mg in the morning and Pepcid 40mg at night. Denies nausea, vomiting, epigastric pain, and dysphagia. Only uses Colestid intermittent when having diarrhea. Denies change in bowel habits, melena and hematochezia. She has upcoming appointment with [facility] 6/25/24.  Overall patient is doing well with current medication regimen and plans to continue following with [facility].  She will follow-up with our office on an as-needed basis.    Follow Up   No follow-ups on file.  Patient was given instructions and counseling regarding her condition or for health maintenance advice. Please see specific information pulled into the AVS if appropriate.      [Obese] : obese [Poor Appearance] : well-appearing [Acute Distress] : not in acute distress [de-identified] : CONSTITUTIONAL: Patient is a very pleasant individual who is well-nourished and appears stated age. \par PSYCHIATRIC: Alert and oriented times three and in no apparent distress, and participates with orthopedic evaluation well.\par HEAD: Atraumatic and nonsyndromic in appearance.\par EENT: No thyromegaly, EOMI.\par RESPIRATORY: Respiratory rate is regular, not dyspneic on examination.\par LYMPHATICS: There is no cervical or axillary lymphadenopathy.\par INTEGUMENTARY: Skin is clean, dry, and intact about the bilateral upper extremities and cervical spine. \par VASCULAR: There is brisk capillary refill about the bilateral upper extremities and radial pulses are 2/4. \par NEUROLOGIC: Negative L'hirmitte, negative Spurling’s sign. There are no pathologic reflexes.  Deep tendon reflexes are well-maintained at +2/4 of the bilateral upper extremities and are symmetric. No finite cervical radiculopathy but patient does complain of burning in her hands. Subtle 1-2 beat clonus in lower extremities\par MUSCULOSKELETAL: There is no visible muscular atrophy. Manual motor strength is well maintained in the bilateral upper extremities. The patient ambulates in a non-myelopathic manner. Normal secondary orthopaedic exam of bilateral shoulders, elbows and hands. Elbow flexion and extension, wrist extension, finger flexion and abduction are well maintained. Motor strength well maintained but she is getting weak and off balance. \par \par  [de-identified] : lumbar MRI from [facility] done on 08/06/2020 shows age appropriate lumbar degenerative disc disease, Rotoscoliosis appreciated, moderate to severe spinal stenosis at L4-L5 and L5-S1.\par MRI thoracic no cord signal abnormality\par \par Cervical MRI from [facility] done on 07/15/2020 demonstrates moderate to severe stenosis, no myelomalacia change in the cord.

## 2024-04-29 RX ORDER — SERTRALINE 25 MG/1
25 TABLET, FILM COATED ORAL
Qty: 90 | Refills: 0 | Status: ACTIVE | COMMUNITY
Start: 2017-02-08 | End: 1900-01-01

## 2024-05-01 ENCOUNTER — APPOINTMENT (OUTPATIENT)
Dept: UROGYNECOLOGY | Facility: CLINIC | Age: 79
End: 2024-05-01
Payer: MEDICARE

## 2024-05-01 VITALS — DIASTOLIC BLOOD PRESSURE: 80 MMHG | SYSTOLIC BLOOD PRESSURE: 137 MMHG

## 2024-05-01 DIAGNOSIS — R32 UNSPECIFIED URINARY INCONTINENCE: ICD-10-CM

## 2024-05-01 DIAGNOSIS — R35.0 FREQUENCY OF MICTURITION: ICD-10-CM

## 2024-05-01 DIAGNOSIS — R39.15 URGENCY OF URINATION: ICD-10-CM

## 2024-05-01 DIAGNOSIS — R35.1 NOCTURIA: ICD-10-CM

## 2024-05-01 PROCEDURE — 99213 OFFICE O/P EST LOW 20 MIN: CPT

## 2024-05-01 RX ORDER — VIBEGRON 75 MG/1
75 TABLET, FILM COATED ORAL
Qty: 30 | Refills: 6 | Status: ACTIVE | COMMUNITY
Start: 2023-09-22 | End: 1900-01-01

## 2024-05-01 NOTE — HISTORY OF PRESENT ILLNESS
[FreeTextEntry1] : Shabnam presents to office accompanied by her friend for f/u on OAB. Pt w/c/o of UUI, frequency, urgency and BEE was started on Gemtesa 75 mg on 09/22/23 presents today for follow up. She reports about 60-70% improvement in her symptoms, urgency more than frequency. States making to the bathroom most of the times with few accidents. Waking up only once at night now. Is unsure if she completely empties her bladder as notes "I always go a lot each time, not just dribbles". PVR normal today. Denies urinary/UTI symptoms, constipation, fever and chills. She denies any SE.

## 2024-05-01 NOTE — DISCUSSION/SUMMARY
[FreeTextEntry1] : She would like to continue on Gemtesa 75mg. Refill sent. We discussed behavioral modifications in detail including decreasing coffee intake to 8oz, increasing water intake to 40oz and voiding q2-3 hours during the day. She will try this and continue on Gemtesa. We also reviewed double voiding and she will try this also. She will f/u in 6-9 months or sooner if needed. Instructed to call with any questions or concerns and she verbalizes understanding.

## 2024-05-31 ENCOUNTER — RESULT REVIEW (OUTPATIENT)
Age: 79
End: 2024-05-31

## 2024-05-31 ENCOUNTER — OUTPATIENT (OUTPATIENT)
Dept: OUTPATIENT SERVICES | Facility: HOSPITAL | Age: 79
LOS: 1 days | End: 2024-05-31
Payer: MEDICARE

## 2024-05-31 ENCOUNTER — APPOINTMENT (OUTPATIENT)
Dept: MAMMOGRAPHY | Facility: CLINIC | Age: 79
End: 2024-05-31
Payer: MEDICARE

## 2024-05-31 DIAGNOSIS — Z98.2 PRESENCE OF CEREBROSPINAL FLUID DRAINAGE DEVICE: Chronic | ICD-10-CM

## 2024-05-31 DIAGNOSIS — Z12.39 ENCOUNTER FOR OTHER SCREENING FOR MALIGNANT NEOPLASM OF BREAST: ICD-10-CM

## 2024-05-31 PROCEDURE — 77063 BREAST TOMOSYNTHESIS BI: CPT | Mod: 26

## 2024-05-31 PROCEDURE — 77063 BREAST TOMOSYNTHESIS BI: CPT

## 2024-05-31 PROCEDURE — 77067 SCR MAMMO BI INCL CAD: CPT

## 2024-05-31 PROCEDURE — 77067 SCR MAMMO BI INCL CAD: CPT | Mod: 26

## 2024-07-01 ENCOUNTER — NON-APPOINTMENT (OUTPATIENT)
Age: 79
End: 2024-07-01

## 2024-08-05 ENCOUNTER — RX RENEWAL (OUTPATIENT)
Age: 79
End: 2024-08-05

## 2024-08-07 ENCOUNTER — APPOINTMENT (OUTPATIENT)
Dept: FAMILY MEDICINE | Facility: CLINIC | Age: 79
End: 2024-08-07

## 2024-08-07 PROCEDURE — G2211 COMPLEX E/M VISIT ADD ON: CPT

## 2024-08-07 PROCEDURE — 99214 OFFICE O/P EST MOD 30 MIN: CPT

## 2024-08-07 NOTE — HISTORY OF PRESENT ILLNESS
[Family Member] : family member [FreeTextEntry1] : CHECK-UP, PRESCRIPTION [de-identified] : MS. ROYAL IS A PLEASANT 78 YO PRESENTING FOR FOLLOW-UP WITH HER DAUGHTER.   SURGERY WENT WELL.   SAW URO/GYN AND CONTINUED ON GEMTESA.   NOT DEPRESSED.  ANXIOUS AT TIMES.  NO PANIC ATTACKS. rolling walker

## 2024-09-06 ENCOUNTER — NON-APPOINTMENT (OUTPATIENT)
Age: 79
End: 2024-09-06

## 2024-09-10 ENCOUNTER — APPOINTMENT (OUTPATIENT)
Dept: FAMILY MEDICINE | Facility: CLINIC | Age: 79
End: 2024-09-10

## 2024-09-10 VITALS
HEIGHT: 59 IN | SYSTOLIC BLOOD PRESSURE: 128 MMHG | DIASTOLIC BLOOD PRESSURE: 74 MMHG | BODY MASS INDEX: 30.24 KG/M2 | OXYGEN SATURATION: 98 % | HEART RATE: 83 BPM | WEIGHT: 150 LBS

## 2024-09-10 DIAGNOSIS — F41.9 ANXIETY DISORDER, UNSPECIFIED: ICD-10-CM

## 2024-09-10 DIAGNOSIS — Z23 ENCOUNTER FOR IMMUNIZATION: ICD-10-CM

## 2024-09-10 PROCEDURE — 90662 IIV NO PRSV INCREASED AG IM: CPT

## 2024-09-10 PROCEDURE — G0008: CPT

## 2024-09-10 PROCEDURE — 99213 OFFICE O/P EST LOW 20 MIN: CPT

## 2024-09-10 NOTE — HISTORY OF PRESENT ILLNESS
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  CARDS, BINGO, GAMES, EXERCISE CLASS NO SIDE EFFECTS. MORE UPBEAT

## 2024-10-22 ENCOUNTER — EMERGENCY (EMERGENCY)
Facility: HOSPITAL | Age: 79
LOS: 1 days | Discharge: HOME | End: 2024-10-22
Attending: STUDENT IN AN ORGANIZED HEALTH CARE EDUCATION/TRAINING PROGRAM
Payer: MEDICARE

## 2024-10-22 VITALS
DIASTOLIC BLOOD PRESSURE: 80 MMHG | HEART RATE: 69 BPM | TEMPERATURE: 98 F | SYSTOLIC BLOOD PRESSURE: 147 MMHG | RESPIRATION RATE: 18 BRPM | OXYGEN SATURATION: 100 %

## 2024-10-22 VITALS
HEART RATE: 76 BPM | SYSTOLIC BLOOD PRESSURE: 135 MMHG | OXYGEN SATURATION: 100 % | HEIGHT: 59 IN | RESPIRATION RATE: 16 BRPM | TEMPERATURE: 98 F | WEIGHT: 149.91 LBS | DIASTOLIC BLOOD PRESSURE: 78 MMHG

## 2024-10-22 DIAGNOSIS — Z98.2 PRESENCE OF CEREBROSPINAL FLUID DRAINAGE DEVICE: Chronic | ICD-10-CM

## 2024-10-22 LAB
FLUAV AG NPH QL: SIGNIFICANT CHANGE UP
FLUBV AG NPH QL: SIGNIFICANT CHANGE UP
RSV RNA NPH QL NAA+NON-PROBE: SIGNIFICANT CHANGE UP
SARS-COV-2 RNA SPEC QL NAA+PROBE: SIGNIFICANT CHANGE UP

## 2024-10-22 PROCEDURE — 71045 X-RAY EXAM CHEST 1 VIEW: CPT | Mod: 26

## 2024-10-22 PROCEDURE — 99284 EMERGENCY DEPT VISIT MOD MDM: CPT

## 2024-10-22 PROCEDURE — 93010 ELECTROCARDIOGRAM REPORT: CPT

## 2024-10-22 RX ORDER — ANTACID TABLETS 500 MG/1
1 TABLET, CHEWABLE ORAL ONCE
Refills: 0 | Status: COMPLETED | OUTPATIENT
Start: 2024-10-22 | End: 2024-10-22

## 2024-10-22 RX ADMIN — ANTACID TABLETS 1 TABLET(S): 500 TABLET, CHEWABLE ORAL at 13:18

## 2024-10-22 NOTE — ED ADULT TRIAGE NOTE - CHIEF COMPLAINT QUOTE
pt states she was eating soup and developed chest pain pointing to mid sternal region. states this happens every time she gets indigestion and takes tums but did not take today. states pain has since resolved.

## 2024-10-22 NOTE — ED PROVIDER NOTE - PATIENT PORTAL LINK FT
You can access the FollowMyHealth Patient Portal offered by [facility] by registering at the following website: [email or web address]. By joining [email or web address]’s FollowMyHealth portal, you will also be able to view your health information using other applications (apps) compatible with our system.

## 2024-10-22 NOTE — ED PROVIDER NOTE - NSFOLLOWUPINSTRUCTIONS_ED_ALL_ED_FT
** Your viral swab for COVID, FLU A, B and RSV is pending. If it is positive we will contact you.     ** Follow up with your primary care doctor in the next 72 hours. Follow up with your GI doctor.     ** Go to the nearest Emergency Department if you experience any new or concerning symptoms, such as:   - worsening pain  - chest pain  - difficulty breathing  - passing out  - unable to eat or drink  - unable to move or feel part of your body  - fever, chills ** Follow up with your primary care doctor in the next 72 hours. Follow up with your GI doctor.     ** Go to the nearest Emergency Department if you experience any new or concerning symptoms, such as:   - worsening pain  - chest pain  - difficulty breathing  - passing out  - unable to eat or drink  - unable to move or feel part of your body  - fever, chills

## 2024-10-22 NOTE — ED PROVIDER NOTE - OBJECTIVE STATEMENT
79-year-old female past medical history of GERD, takes Tums with p.o. intake,  presents with resolved midline retrosternal chest pain with p.o. intake.  Patient states pain is similar to prior "indigestion pain",   States she usually treats with Tums, but was unable to take it today.  Patient states episode today occurred at the [facility] and staff was concerned because they thought she had "chest pain", so they called ambulance.  At time evaluation in the ER, patient endorsed she is at her baseline, denies any pain.  patient does endorse several days of postnasal drip, URI-like symptoms. Daughter at bedside who resides with patient,  endorse she is at her baseline.

## 2024-10-22 NOTE — ED PROVIDER NOTE - PROGRESS NOTE DETAILS
Frida Santos MD, Attending   patient has existing gastroenterologist that patient's  daughter agrees to facility follow-up with.   Daughter very comfortable  with outpatient follow-up. Frida Santos MD, Attending  well appearing, no acute findings on CXR, agreeable to outpt follow up.

## 2024-10-22 NOTE — ED ADULT NURSE NOTE - NSFALLUNIVINTERV_ED_ALL_ED
Bed/Stretcher in lowest position, wheels locked, appropriate side rails in place/Call bell, personal items and telephone in reach/Instruct patient to call for assistance before getting out of bed/chair/stretcher/Non-slip footwear applied when patient is off stretcher/[place] to call system/Physically safe environment - no spills, clutter or unnecessary equipment/Purposeful proactive rounding/Room/bathroom lighting operational, light cord in reach

## 2024-10-22 NOTE — ED PROVIDER NOTE - PHYSICAL EXAMINATION
Frida Santos MD, Attending  Gen: Well appearing in NAD   Head: NC/AT  Neck: trachea midline  Resp:  No distress, 97% on RA, no increased wob  Abd: non tender, non distended  Ext: no deformities  Neuro:  A&O appears non focal  Skin:  Warm and dry as visualized  Psych:  Normal affect and mood

## 2024-10-22 NOTE — ED PROVIDER NOTE - CLINICAL SUMMARY MEDICAL DECISION MAKING FREE TEXT BOX
Frida Santos MD, Attending  ddx includes, but is not limited to the following:  Indigestion, GERD, gastritis,  viral URI, minimal suspicion for pancreatitis, pneumonia,  ACS.   plan:  viral swab to rule out respiratory viruses, chest x-ray to rule out pneumonia,  pneumothorax,  anticipate outpatient follow-up with GI.  update: see progress notes.   ----

## 2024-10-22 NOTE — ED ADULT NURSE NOTE - OBJECTIVE STATEMENT
80 YO female presented to the ED with c/o substernal chest pain and epigastric abdominal pain that started after having soup this morning. patient stated " I have indigestion that gives me a chest pain, resolves with tums. " Patient denies taking Tums today. C/o non-productive cough and congestion since 2 days. denies fever chills, nausea, vomiting, dizziness or urinary symptoms. nasal swab sent, calcium carbonate given asper orders. Awaiting CXR. Patient placed on cardiac monitor in NSR, EKG done.

## 2024-10-22 NOTE — ED PROVIDER NOTE - DISCHARGE DATE
[facility]  [address] [place] [address]    Patient: Ysabel Hay  MRN: [id number]  : 1931    Reason for Visit:   Chief Complaint   Patient presents with   • Medication Refill       History of Present Illness:  Ysabel Hay is a 88 year old female  Comes in for a routine checkup and refills.  Overall she is doing well but her knees are hurting her.  She did see an orthopedic surgeon who so far has not recommended any injections or surgery.  She does have history of hypertension, hypothyroidism osteoarthritis and osteoporosis.  She does have acid reflux.  It is time to do her labs.  Her last sugars were okay    She claims she is eating well and she is staying at home with her  who will be 90 soon.  So far she is managing it with her daughter's help    She denies headaches visual disturbance chest pains or shortness of breath.  Denies any change in her bowel or bladder    Review of Systems   Constitutional: Negative.    HENT: Negative.    Eyes: Negative.    Respiratory: Negative.    Cardiovascular: Negative.    Gastrointestinal: Negative.    Endocrine: Negative.    Genitourinary: Negative.    Musculoskeletal: Positive for arthralgias and joint swelling.   Skin: Negative.    Neurological: Negative.    Psychiatric/Behavioral: Negative.        ALLERGIES:  Latex; Quinolones; Adhesive   (environmental); Neomycin-bacitracin-polymyxin; Paroxetine hcl; and Penicillins    Current Outpatient Medications   Medication Sig Dispense Refill   • esomeprazole (NEXIUM) 40 MG capsule Take 1 capsule by mouth daily. 90 capsule 3   • pravastatin (PRAVACHOL) 40 MG tablet Take 1 tablet by mouth daily. 90 tablet 3   • enalapril (VASOTEC) 20 MG tablet Take 1 tablet by mouth daily. 90 tablet 3   • raloxifene (EVISTA) 60 MG tablet Take 1 tablet by mouth daily. As directed 90 tablet 3   • metoPROLOL succinate (TOPROL-XL) 25 MG 24 hr tablet Take 1 tablet by mouth daily. 90 tablet 3   • levothyroxine  (SYNTHROID, LEVOTHROID) 50 MCG tablet Take 1 tablet by mouth daily. 90 tablet 3   • metoPROLOL succinate (TOPROL-XL) 25 MG 24 hr tablet TAKE 1 TABLET BY MOUTH DAILY 100 tablet 0   • raloxifene (EVISTA) 60 MG tablet TAKE 1 TABLET BY MOUTH DAILY AS DIRECTED 90 tablet 0   • aspirin 81 MG tablet Take 1 tablet by mouth daily.     • Calcium Carbonate 1500 (600 Ca) MG Tab Take 1 tablet by mouth daily.     • Ca & Phos-Vit D-Mag (CALCIUM) 450--133 Tab Take 1 tablet by mouth daily.     • estrogens, conjugated (PREMARIN) 0.625 MG tablet Take 1 tablet by mouth.     • valsartan (DIOVAN) 80 MG tablet Take 80 mg by mouth daily.       No current facility-administered medications for this visit.        No past medical history on file.    No past surgical history on file.     No family history on file.    OB History   No obstetric history on file.       Social History     Socioeconomic History   • Marital status: /Civil Union     Spouse name: Not on file   • Number of children: Not on file   • Years of education: Not on file   • Highest education level: Not on file   Occupational History   • Not on file   Social Needs   • Financial resource strain: Not on file   • Food insecurity:     Worry: Not on file     Inability: Not on file   • Transportation needs:     Medical: Not on file     Non-medical: Not on file   Tobacco Use   • Smoking status: Never Smoker   • Smokeless tobacco: Never Used   Substance and Sexual Activity   • Alcohol use: No     Frequency: Never   • Drug use: No   • Sexual activity: Not on file   Lifestyle   • Physical activity:     Days per week: Not on file     Minutes per session: Not on file   • Stress: Not on file   Relationships   • Social connections:     Talks on phone: Not on file     Gets together: Not on file     Attends Hindu service: Not on file     Active member of club or organization: Not on file     Attends meetings of clubs or organizations: Not on file     Relationship status: Not on  file   • Intimate partner violence:     Fear of current or ex partner: Not on file     Emotionally abused: Not on file     Physically abused: Not on file     Forced sexual activity: Not on file   Other Topics Concern   • Not on file   Social History Narrative   • Not on file       /64 (BP Location: LUE - Left upper extremity)   Pulse 74   Temp 98.1 °F (36.7 °C) (Tympanic)   Resp 17   Ht 5' 2\" (1.575 m)   Wt 50.6 kg (111 lb 9.6 oz)   BMI 20.41 kg/m²   BSA 1.49 m²     Physical Exam   Constitutional: She is oriented to person, place, and time. She appears well-developed and well-nourished.   Weight and blood pressure noted   HENT:   Head: Normocephalic.   Right Ear: External ear normal.   Left Ear: External ear normal.   Nose: Nose normal.   Mouth/Throat: Oropharynx is clear and moist.   Eyes: Pupils are equal, round, and reactive to light. Conjunctivae and EOM are normal.   Neck: Normal range of motion. Neck supple.   Cardiovascular: Normal rate, regular rhythm and normal heart sounds.   Pulmonary/Chest: Breath sounds normal.   Abdominal: Soft. Bowel sounds are normal. Musculoskeletal: Normal range of motion.      Comments: Back pain     Neurological: She is alert and oriented to person, place, and time. She has normal reflexes.   Skin: Skin is warm and dry.   Psychiatric: She has a normal mood and affect. Her behavior is normal. Judgment and thought content normal.   Anxiety under control       Assessment:   ED Diagnosis   1. Essential hypertension  LIPID PANEL WITH REFLEX   2. Anxiety     3. Gastroesophageal reflux disease without esophagitis  esomeprazole (NEXIUM) 40 MG capsule   4. Chronic kidney disease, stage 4 (severe) (CMS/[facility])  CBC WITH DIFFERENTIAL    COMPREHENSIVE METABOLIC PANEL    LIPID PANEL WITH REFLEX   5. Internal derangement of right knee     6. Arthritis     7. Type 2 diabetes mellitus without complication, without long-term current use of insulin (CMS/[facility])  GLYCOHEMOGLOBIN    LIPID  PANEL WITH REFLEX   8. Acquired hypothyroidism  LIPID PANEL WITH REFLEX    THYROID STIMULATING HORMONE       Plan: She was given refills on her medication.  Labs were ordered.  She was reassured.  Patient is already received a flu shot.  Follow-up as needed   22-Oct-2024

## 2024-10-25 DIAGNOSIS — Z88.6 ALLERGY STATUS TO ANALGESIC AGENT: ICD-10-CM

## 2024-10-25 DIAGNOSIS — R10.13 EPIGASTRIC PAIN: ICD-10-CM

## 2024-10-25 DIAGNOSIS — K21.9 GASTRO-ESOPHAGEAL REFLUX DISEASE WITHOUT ESOPHAGITIS: ICD-10-CM

## 2024-11-08 ENCOUNTER — APPOINTMENT (OUTPATIENT)
Dept: UROGYNECOLOGY | Facility: CLINIC | Age: 79
End: 2024-11-08

## 2024-11-08 VITALS — HEART RATE: 70 BPM | SYSTOLIC BLOOD PRESSURE: 129 MMHG | DIASTOLIC BLOOD PRESSURE: 73 MMHG

## 2024-11-08 DIAGNOSIS — R35.1 NOCTURIA: ICD-10-CM

## 2024-11-08 DIAGNOSIS — R39.15 URGENCY OF URINATION: ICD-10-CM

## 2024-11-08 DIAGNOSIS — R35.0 FREQUENCY OF MICTURITION: ICD-10-CM

## 2024-11-08 DIAGNOSIS — R32 UNSPECIFIED URINARY INCONTINENCE: ICD-10-CM

## 2024-11-08 PROCEDURE — 99214 OFFICE O/P EST MOD 30 MIN: CPT

## 2024-11-08 PROCEDURE — 51798 US URINE CAPACITY MEASURE: CPT

## 2024-11-08 RX ORDER — MIRABEGRON 25 MG/1
25 TABLET, EXTENDED RELEASE ORAL
Qty: 30 | Refills: 2 | Status: ACTIVE | COMMUNITY
Start: 2024-11-08 | End: 1900-01-01

## 2024-11-20 PROCEDURE — 93005 ELECTROCARDIOGRAM TRACING: CPT

## 2024-11-20 PROCEDURE — 87637 SARSCOV2&INF A&B&RSV AMP PRB: CPT

## 2024-11-20 PROCEDURE — 71045 X-RAY EXAM CHEST 1 VIEW: CPT

## 2024-11-20 PROCEDURE — 99285 EMERGENCY DEPT VISIT HI MDM: CPT | Mod: 25

## 2024-12-20 ENCOUNTER — APPOINTMENT (OUTPATIENT)
Dept: UROGYNECOLOGY | Facility: CLINIC | Age: 79
End: 2024-12-20
Payer: MEDICARE

## 2024-12-20 VITALS
HEIGHT: 59 IN | BODY MASS INDEX: 28.22 KG/M2 | WEIGHT: 140 LBS | HEART RATE: 73 BPM | RESPIRATION RATE: 14 BRPM | SYSTOLIC BLOOD PRESSURE: 135 MMHG | DIASTOLIC BLOOD PRESSURE: 76 MMHG

## 2024-12-20 DIAGNOSIS — R32 UNSPECIFIED URINARY INCONTINENCE: ICD-10-CM

## 2024-12-20 DIAGNOSIS — R35.1 NOCTURIA: ICD-10-CM

## 2024-12-20 DIAGNOSIS — R35.0 FREQUENCY OF MICTURITION: ICD-10-CM

## 2024-12-20 DIAGNOSIS — R39.15 URGENCY OF URINATION: ICD-10-CM

## 2024-12-20 LAB
BILIRUB UR QL STRIP: NEGATIVE
CLARITY UR: CLEAR
COLLECTION METHOD: NORMAL
GLUCOSE UR-MCNC: NEGATIVE
HCG UR QL: 0.2 EU/DL
HGB UR QL STRIP.AUTO: NEGATIVE
KETONES UR-MCNC: NEGATIVE
LEUKOCYTE ESTERASE UR QL STRIP: NEGATIVE
NITRITE UR QL STRIP: NEGATIVE
PH UR STRIP: 6.5
PROT UR STRIP-MCNC: NEGATIVE
SP GR UR STRIP: 1.02

## 2024-12-20 PROCEDURE — 81003 URINALYSIS AUTO W/O SCOPE: CPT | Mod: QW

## 2024-12-20 PROCEDURE — 99213 OFFICE O/P EST LOW 20 MIN: CPT

## 2024-12-20 PROCEDURE — 51798 US URINE CAPACITY MEASURE: CPT

## 2024-12-20 RX ORDER — TROSPIUM CHLORIDE 60 MG/1
60 CAPSULE, EXTENDED RELEASE ORAL
Qty: 30 | Refills: 0 | Status: ACTIVE | COMMUNITY
Start: 2024-12-20 | End: 1900-01-01

## 2024-12-26 ENCOUNTER — EMERGENCY (EMERGENCY)
Facility: HOSPITAL | Age: 79
LOS: 1 days | Discharge: HOME | End: 2024-12-26
Attending: STUDENT IN AN ORGANIZED HEALTH CARE EDUCATION/TRAINING PROGRAM
Payer: MEDICARE

## 2024-12-26 VITALS
SYSTOLIC BLOOD PRESSURE: 181 MMHG | TEMPERATURE: 98 F | RESPIRATION RATE: 18 BRPM | OXYGEN SATURATION: 98 % | DIASTOLIC BLOOD PRESSURE: 95 MMHG | HEART RATE: 68 BPM | WEIGHT: 158.07 LBS

## 2024-12-26 VITALS
SYSTOLIC BLOOD PRESSURE: 147 MMHG | HEART RATE: 77 BPM | TEMPERATURE: 98 F | RESPIRATION RATE: 16 BRPM | OXYGEN SATURATION: 94 % | DIASTOLIC BLOOD PRESSURE: 80 MMHG

## 2024-12-26 DIAGNOSIS — Z98.2 PRESENCE OF CEREBROSPINAL FLUID DRAINAGE DEVICE: Chronic | ICD-10-CM

## 2024-12-26 LAB
ALBUMIN SERPL ELPH-MCNC: 4.2 G/DL — SIGNIFICANT CHANGE UP (ref 3.3–5.2)
ALP SERPL-CCNC: 71 U/L — SIGNIFICANT CHANGE UP (ref 40–120)
ALT FLD-CCNC: 15 U/L — SIGNIFICANT CHANGE UP
ANION GAP SERPL CALC-SCNC: 11 MMOL/L — SIGNIFICANT CHANGE UP (ref 5–17)
AST SERPL-CCNC: 16 U/L — SIGNIFICANT CHANGE UP
BASOPHILS # BLD AUTO: 0.02 K/UL — SIGNIFICANT CHANGE UP (ref 0–0.2)
BASOPHILS NFR BLD AUTO: 0.3 % — SIGNIFICANT CHANGE UP (ref 0–2)
BILIRUB SERPL-MCNC: 0.3 MG/DL — LOW (ref 0.4–2)
BUN SERPL-MCNC: 21.3 MG/DL — HIGH (ref 8–20)
CALCIUM SERPL-MCNC: 9.9 MG/DL — SIGNIFICANT CHANGE UP (ref 8.4–10.5)
CHLORIDE SERPL-SCNC: 107 MMOL/L — SIGNIFICANT CHANGE UP (ref 96–108)
CO2 SERPL-SCNC: 22 MMOL/L — SIGNIFICANT CHANGE UP (ref 22–29)
CREAT SERPL-MCNC: 0.68 MG/DL — SIGNIFICANT CHANGE UP (ref 0.5–1.3)
EGFR: 89 ML/MIN/1.73M2 — SIGNIFICANT CHANGE UP
EOSINOPHIL # BLD AUTO: 0.09 K/UL — SIGNIFICANT CHANGE UP (ref 0–0.5)
EOSINOPHIL NFR BLD AUTO: 1.3 % — SIGNIFICANT CHANGE UP (ref 0–6)
GLUCOSE SERPL-MCNC: 96 MG/DL — SIGNIFICANT CHANGE UP (ref 70–99)
HCT VFR BLD CALC: 38 % — SIGNIFICANT CHANGE UP (ref 34.5–45)
HGB BLD-MCNC: 13 G/DL — SIGNIFICANT CHANGE UP (ref 11.5–15.5)
IMM GRANULOCYTES NFR BLD AUTO: 0.3 % — SIGNIFICANT CHANGE UP (ref 0–0.9)
LYMPHOCYTES # BLD AUTO: 0.69 K/UL — LOW (ref 1–3.3)
LYMPHOCYTES # BLD AUTO: 9.9 % — LOW (ref 13–44)
MCHC RBC-ENTMCNC: 29.3 PG — SIGNIFICANT CHANGE UP (ref 27–34)
MCHC RBC-ENTMCNC: 34.2 G/DL — SIGNIFICANT CHANGE UP (ref 32–36)
MCV RBC AUTO: 85.8 FL — SIGNIFICANT CHANGE UP (ref 80–100)
MONOCYTES # BLD AUTO: 0.56 K/UL — SIGNIFICANT CHANGE UP (ref 0–0.9)
MONOCYTES NFR BLD AUTO: 8 % — SIGNIFICANT CHANGE UP (ref 2–14)
NEUTROPHILS # BLD AUTO: 5.62 K/UL — SIGNIFICANT CHANGE UP (ref 1.8–7.4)
NEUTROPHILS NFR BLD AUTO: 80.2 % — HIGH (ref 43–77)
PLATELET # BLD AUTO: 231 K/UL — SIGNIFICANT CHANGE UP (ref 150–400)
POTASSIUM SERPL-MCNC: 4 MMOL/L — SIGNIFICANT CHANGE UP (ref 3.5–5.3)
POTASSIUM SERPL-SCNC: 4 MMOL/L — SIGNIFICANT CHANGE UP (ref 3.5–5.3)
PROT SERPL-MCNC: 6.5 G/DL — LOW (ref 6.6–8.7)
RBC # BLD: 4.43 M/UL — SIGNIFICANT CHANGE UP (ref 3.8–5.2)
RBC # FLD: 12.5 % — SIGNIFICANT CHANGE UP (ref 10.3–14.5)
SODIUM SERPL-SCNC: 140 MMOL/L — SIGNIFICANT CHANGE UP (ref 135–145)
TROPONIN T, HIGH SENSITIVITY RESULT: 13 NG/L — SIGNIFICANT CHANGE UP (ref 0–51)
TROPONIN T, HIGH SENSITIVITY RESULT: 16 NG/L — SIGNIFICANT CHANGE UP (ref 0–51)
WBC # BLD: 7 K/UL — SIGNIFICANT CHANGE UP (ref 3.8–10.5)
WBC # FLD AUTO: 7 K/UL — SIGNIFICANT CHANGE UP (ref 3.8–10.5)

## 2024-12-26 PROCEDURE — 99285 EMERGENCY DEPT VISIT HI MDM: CPT

## 2024-12-26 PROCEDURE — 71045 X-RAY EXAM CHEST 1 VIEW: CPT

## 2024-12-26 PROCEDURE — 80053 COMPREHEN METABOLIC PANEL: CPT

## 2024-12-26 PROCEDURE — 84484 ASSAY OF TROPONIN QUANT: CPT

## 2024-12-26 PROCEDURE — 93010 ELECTROCARDIOGRAM REPORT: CPT

## 2024-12-26 PROCEDURE — 99285 EMERGENCY DEPT VISIT HI MDM: CPT | Mod: 25

## 2024-12-26 PROCEDURE — 93005 ELECTROCARDIOGRAM TRACING: CPT

## 2024-12-26 PROCEDURE — 36415 COLL VENOUS BLD VENIPUNCTURE: CPT

## 2024-12-26 PROCEDURE — 71045 X-RAY EXAM CHEST 1 VIEW: CPT | Mod: 26

## 2024-12-26 PROCEDURE — 85025 COMPLETE CBC W/AUTO DIFF WBC: CPT

## 2024-12-26 RX ORDER — ONDANSETRON HYDROCHLORIDE 4 MG/1
4 TABLET, FILM COATED ORAL ONCE
Refills: 0 | Status: COMPLETED | OUTPATIENT
Start: 2024-12-26 | End: 2024-12-26

## 2024-12-26 RX ORDER — MAGNESIUM, ALUMINUM HYDROXIDE 200-225/5
30 SUSPENSION, ORAL (FINAL DOSE FORM) ORAL ONCE
Refills: 0 | Status: COMPLETED | OUTPATIENT
Start: 2024-12-26 | End: 2024-12-26

## 2024-12-26 RX ADMIN — Medication 30 MILLILITER(S): at 15:26

## 2024-12-26 NOTE — ED PROVIDER NOTE - PATIENT PORTAL LINK FT
You can access the FollowMyHealth Patient Portal offered by [facility] by registering at the following website: [email or web address]. By joining [employer]’s FollowMyHealth portal, you will also be able to view your health information using other applications (apps) compatible with our system.

## 2024-12-26 NOTE — ED ADULT TRIAGE NOTE - CHIEF COMPLAINT QUOTE
PT C/O chest pressure/ epigastric pain that started when she was going to eat today. +nausea and increased salivation. Received 4 mg of Zofran. No cardiac hx.

## 2024-12-26 NOTE — ED PROVIDER NOTE - OBJECTIVE STATEMENT
Pt is a 78 yo F co chest pain.  PMHx significant for htn, anxiety.  Pt states that today she had substernal chest pressure associated with increased saliva in her mouth.  Pt states that she was here for the same a few months ago. no n/v. no fever/chills. Pt follows with dr. garcia.

## 2024-12-26 NOTE — ED PROVIDER NOTE - CLINICAL SUMMARY MEDICAL DECISION MAKING FREE TEXT BOX
labs and imaging [id number].  ekg nonischemic. two trops neg. cards consult appreciated and if two trops neg to d/c with cards f/up. instructed to return for worsening cp, sob, or any other concerns.  Pt given a copy of all results and instructed to f/up with pcp regarding any abnormal results.

## 2024-12-26 NOTE — ED ADULT NURSE NOTE - OBJECTIVE STATEMENT
pt to ED with c/o chest pressure/abdominal pain. reports has had similar pain in the past and had cardiac workup which "showed nothing". now reports mild mid epigastric pain. noticed when she went to eat lunch today. also reports increased mucus production.

## 2024-12-26 NOTE — CONSULT NOTE ADULT - SUBJECTIVE AND OBJECTIVE BOX
[facility]                              [address] [address]                                                 PHONE: [phone]                                                 FAX: [phone]  ------------------------------------------------------------------------------------------------    Chief complaint: nausea    79y Female with past medical history as under C/O chest pressure/ epigastric pain that started when she was going to eat today. +nausea and increased salivation. She had a similar episode in October.  At the time of evaluation, pt reports feeling better. She denies any chest pain or shortness of breath. She follows with Dr. Adkins for mitral regurgitation.     RELEVANT PHYSICAL EXAM:  Neck: No obvious JVD  Cardiovascular: regular S1, S2  Respiratory: Lungs clear to auscultation; no crepitations, no wheeze  Musculoskeletal: No edema    Vital Signs Last 24 Hrs  T(C): 36.7 (26 Dec 2024 14:21), Max: 36.7 (26 Dec 2024 14:21)  T(F): 98.1 (26 Dec 2024 14:21), Max: 98.1 (26 Dec 2024 14:21)  HR: 68 (26 Dec 2024 14:21) (68 - 68)  BP: 181/95 (26 Dec 2024 14:21) (181/95 - 181/95)  BP(mean): --  RR: 18 (26 Dec 2024 14:21) (18 - 18)  SpO2: 98% (26 Dec 2024 14:21) (98% - 98%)    Parameters below as of 26 Dec 2024 14:21  Patient On (Oxygen Delivery Method): room air        PAST MEDICAL & SURGICAL HISTORY:  Ventricular shunt in place      Depression      S/P  shunt          acetaminophen containing compounds (Rash)      LABS:                        13.0   7.00  )-----------( 231      ( 26 Dec 2024 15:27 )             38.0     12-26    140  |  107  |  21.3[H]  ----------------------------<  96  4.0   |  22.0  |  0.68    Ca    9.9      26 Dec 2024 15:27    TPro  6.5[L]  /  Alb  4.2  /  TBili  0.3[L]  /  DBili  x   /  AST  16  /  ALT  15  /  AlkPhos  71  12-26    RADIOLOGY & ADDITIONAL STUDIES: (reviewed)  CXR was independently visualized/reviewed and demonstrated: clear lungs    CARDIOLOGY TESTING:(reviewed)     ECG (Independent visualization): NSR    TELEMETRY independently visualized/reviewed and demonstrated : NSR    MEDICATIONS:(reviewed)  Home Medications:  Home Medications:  aspirin:  orally  (12 Dec 2017 20:51)  Fish Oil:  orally  (12 Dec 2017 20:51)  sertraline 25 mg oral tablet: 1 tab(s) orally once a day (12 Dec 2017 20:51)  triamterene:  orally  (12 Dec 2017 20:51)  Vitamin C 500 mg oral tablet: 1 tab(s) orally once a day (12 Dec 2017 20:51)  Vitamin D3 1000 intl units oral tablet: 1 tab(s) orally once a day (12 Dec 2017 20:51)    MEDICATIONS  (STANDING):    MEDICATIONS  (PRN):    ASSESSMENT AND PLAN:    79y Female with HTN, moderate mitral regurgitation with normal LVEF with epigastric pain with nausea and spitting up mucus.    Chest pain/Epigastric pain with nausea  - No clear evidence of ACS at this time based upon symptoms, ECG or enzymes.  - Check cardiac enzymes X2.  - If cardiac enzymes negative X2, can be discharged from cardiac standpoint.   - No fluid overload   - No change in outpt medical regimen.   - Plan for outpt Echo and stress testing in the office and FU    HTN  - On amlodipine and ARB    Additional history obtained from family by phone [independent historian] and plan of care discussed in detail    Plan d/w Dr. Baires

## 2024-12-26 NOTE — ED PROVIDER NOTE - CARE PROVIDER_API CALL
Trini Adkins  Cardiovascular Disease  [address] [place] [id number]  Phone: [phone]  Fax: [phone]  Follow Up Time: 1-3 Days

## 2025-01-29 ENCOUNTER — APPOINTMENT (OUTPATIENT)
Dept: FAMILY MEDICINE | Facility: CLINIC | Age: 80
End: 2025-01-29

## 2025-01-29 VITALS
HEART RATE: 71 BPM | DIASTOLIC BLOOD PRESSURE: 78 MMHG | SYSTOLIC BLOOD PRESSURE: 132 MMHG | BODY MASS INDEX: 30.7 KG/M2 | WEIGHT: 152 LBS

## 2025-01-29 PROCEDURE — G2211 COMPLEX E/M VISIT ADD ON: CPT

## 2025-01-29 PROCEDURE — 99214 OFFICE O/P EST MOD 30 MIN: CPT

## 2025-01-29 RX ORDER — AMLODIPINE BESYLATE 5 MG/1
5 TABLET ORAL
Refills: 0 | Status: ACTIVE | COMMUNITY

## 2025-02-04 ENCOUNTER — APPOINTMENT (OUTPATIENT)
Dept: UROGYNECOLOGY | Facility: CLINIC | Age: 80
End: 2025-02-04

## 2025-02-04 VITALS
HEART RATE: 80 BPM | DIASTOLIC BLOOD PRESSURE: 72 MMHG | SYSTOLIC BLOOD PRESSURE: 138 MMHG | HEIGHT: 59 IN | BODY MASS INDEX: 30.64 KG/M2 | WEIGHT: 152 LBS

## 2025-02-04 DIAGNOSIS — R39.15 URGENCY OF URINATION: ICD-10-CM

## 2025-02-04 DIAGNOSIS — R35.0 FREQUENCY OF MICTURITION: ICD-10-CM

## 2025-02-04 DIAGNOSIS — R32 UNSPECIFIED URINARY INCONTINENCE: ICD-10-CM

## 2025-02-04 PROCEDURE — 99213 OFFICE O/P EST LOW 20 MIN: CPT

## 2025-02-04 PROCEDURE — 51798 US URINE CAPACITY MEASURE: CPT

## 2025-02-09 LAB
25(OH)D3 SERPL-MCNC: 20.2 NG/ML
ALBUMIN SERPL ELPH-MCNC: 3.9 G/DL
ALP BLD-CCNC: 64 U/L
ALT SERPL-CCNC: 13 U/L
ANION GAP SERPL CALC-SCNC: 8 MMOL/L
AST SERPL-CCNC: 12 U/L
BASOPHILS # BLD AUTO: 0.04 K/UL
BASOPHILS NFR BLD AUTO: 0.9 %
BILIRUB SERPL-MCNC: 0.3 MG/DL
BUN SERPL-MCNC: 27 MG/DL
CALCIUM SERPL-MCNC: 10.7 MG/DL
CALCIUM SERPL-MCNC: 10.7 MG/DL
CHLORIDE SERPL-SCNC: 109 MMOL/L
CHOLEST SERPL-MCNC: 180 MG/DL
CO2 SERPL-SCNC: 26 MMOL/L
CREAT SERPL-MCNC: 0.77 MG/DL
EGFR: 79 ML/MIN/1.73M2
EOSINOPHIL # BLD AUTO: 0.14 K/UL
EOSINOPHIL NFR BLD AUTO: 3 %
GLUCOSE SERPL-MCNC: 100 MG/DL
HCT VFR BLD CALC: 38.2 %
HDLC SERPL-MCNC: 68 MG/DL
HGB BLD-MCNC: 12.5 G/DL
IMM GRANULOCYTES NFR BLD AUTO: 0.2 %
LDLC SERPL CALC-MCNC: 95 MG/DL
LYMPHOCYTES # BLD AUTO: 0.84 K/UL
LYMPHOCYTES NFR BLD AUTO: 18 %
MAN DIFF?: NORMAL
MCHC RBC-ENTMCNC: 29.1 PG
MCHC RBC-ENTMCNC: 32.7 G/DL
MCV RBC AUTO: 89 FL
MONOCYTES # BLD AUTO: 0.58 K/UL
MONOCYTES NFR BLD AUTO: 12.4 %
NEUTROPHILS # BLD AUTO: 3.06 K/UL
NEUTROPHILS NFR BLD AUTO: 65.5 %
NONHDLC SERPL-MCNC: 112 MG/DL
PARATHYROID HORMONE INTACT: 123 PG/ML
PLATELET # BLD AUTO: 204 K/UL
POTASSIUM SERPL-SCNC: 4.6 MMOL/L
PROT SERPL-MCNC: 6 G/DL
RBC # BLD: 4.29 M/UL
RBC # FLD: 12.9 %
SODIUM SERPL-SCNC: 144 MMOL/L
T4 FREE SERPL-MCNC: 1 NG/DL
TRIGL SERPL-MCNC: 92 MG/DL
TSH SERPL-ACNC: 0.79 UIU/ML
WBC # FLD AUTO: 4.67 K/UL

## 2025-02-14 RX ORDER — IRBESARTAN 150 MG/1
150 TABLET ORAL
Qty: 90 | Refills: 1 | Status: ACTIVE | COMMUNITY

## 2025-03-06 ENCOUNTER — APPOINTMENT (OUTPATIENT)
Dept: GASTROENTEROLOGY | Facility: CLINIC | Age: 80
End: 2025-03-06

## 2025-04-11 ENCOUNTER — APPOINTMENT (OUTPATIENT)
Dept: FAMILY MEDICINE | Facility: CLINIC | Age: 80
End: 2025-04-11

## 2025-05-14 ENCOUNTER — RX RENEWAL (OUTPATIENT)
Age: 80
End: 2025-05-14

## 2025-06-09 ENCOUNTER — APPOINTMENT (OUTPATIENT)
Dept: ORTHOPEDIC SURGERY | Facility: CLINIC | Age: 80
End: 2025-06-09

## 2025-06-11 ENCOUNTER — APPOINTMENT (OUTPATIENT)
Dept: MAMMOGRAPHY | Facility: CLINIC | Age: 80
End: 2025-06-11
Payer: MEDICARE

## 2025-06-11 ENCOUNTER — RESULT REVIEW (OUTPATIENT)
Age: 80
End: 2025-06-11

## 2025-06-11 ENCOUNTER — OUTPATIENT (OUTPATIENT)
Dept: OUTPATIENT SERVICES | Facility: HOSPITAL | Age: 80
LOS: 1 days | End: 2025-06-11
Payer: MEDICARE

## 2025-06-11 DIAGNOSIS — Z12.39 ENCOUNTER FOR OTHER SCREENING FOR MALIGNANT NEOPLASM OF BREAST: ICD-10-CM

## 2025-06-11 DIAGNOSIS — Z98.2 PRESENCE OF CEREBROSPINAL FLUID DRAINAGE DEVICE: Chronic | ICD-10-CM

## 2025-06-11 PROCEDURE — 77063 BREAST TOMOSYNTHESIS BI: CPT | Mod: 26

## 2025-06-11 PROCEDURE — 77063 BREAST TOMOSYNTHESIS BI: CPT

## 2025-06-11 PROCEDURE — 77067 SCR MAMMO BI INCL CAD: CPT

## 2025-06-11 PROCEDURE — 77067 SCR MAMMO BI INCL CAD: CPT | Mod: 26

## 2025-06-12 ENCOUNTER — APPOINTMENT (OUTPATIENT)
Dept: ORTHOPEDIC SURGERY | Facility: CLINIC | Age: 80
End: 2025-06-12

## 2025-06-17 ENCOUNTER — APPOINTMENT (OUTPATIENT)
Dept: ORTHOPEDIC SURGERY | Facility: CLINIC | Age: 80
End: 2025-06-17

## 2025-06-17 VITALS — HEIGHT: 59 IN | BODY MASS INDEX: 31.25 KG/M2 | WEIGHT: 155 LBS

## 2025-06-17 PROBLEM — M18.0 ARTHRITIS OF CARPOMETACARPAL (CMC) JOINT OF BOTH THUMBS: Status: ACTIVE | Noted: 2025-06-17

## 2025-06-17 PROBLEM — M25.531 RIGHT WRIST PAIN: Status: ACTIVE | Noted: 2025-06-17

## 2025-06-17 PROBLEM — M25.531 PAIN IN BOTH WRISTS: Status: ACTIVE | Noted: 2025-06-17

## 2025-06-17 PROBLEM — M65.4 DE QUERVAIN'S TENOSYNOVITIS, BILATERAL: Status: ACTIVE | Noted: 2025-06-17

## 2025-06-17 PROBLEM — M25.641 STIFFNESS OF JOINTS OF BOTH HANDS: Status: ACTIVE | Noted: 2025-06-17

## 2025-06-17 PROCEDURE — 73110 X-RAY EXAM OF WRIST: CPT | Mod: 50

## 2025-06-17 PROCEDURE — 99214 OFFICE O/P EST MOD 30 MIN: CPT | Mod: 25

## 2025-06-17 PROCEDURE — 20550 NJX 1 TENDON SHEATH/LIGAMENT: CPT | Mod: 50,59

## 2025-06-17 PROCEDURE — 20605 DRAIN/INJ JOINT/BURSA W/O US: CPT | Mod: 50

## 2025-08-04 ENCOUNTER — APPOINTMENT (OUTPATIENT)
Dept: UROGYNECOLOGY | Facility: CLINIC | Age: 80
End: 2025-08-04
Payer: MEDICARE

## 2025-08-04 ENCOUNTER — RESULT CHARGE (OUTPATIENT)
Age: 80
End: 2025-08-04

## 2025-08-04 VITALS
DIASTOLIC BLOOD PRESSURE: 78 MMHG | BODY MASS INDEX: 31.25 KG/M2 | WEIGHT: 155 LBS | HEIGHT: 59 IN | HEART RATE: 85 BPM | SYSTOLIC BLOOD PRESSURE: 120 MMHG | RESPIRATION RATE: 14 BRPM

## 2025-08-04 DIAGNOSIS — R35.1 NOCTURIA: ICD-10-CM

## 2025-08-04 DIAGNOSIS — R39.15 URGENCY OF URINATION: ICD-10-CM

## 2025-08-04 DIAGNOSIS — R32 UNSPECIFIED URINARY INCONTINENCE: ICD-10-CM

## 2025-08-04 DIAGNOSIS — R35.0 FREQUENCY OF MICTURITION: ICD-10-CM

## 2025-08-04 PROCEDURE — 51798 US URINE CAPACITY MEASURE: CPT

## 2025-08-04 PROCEDURE — 99213 OFFICE O/P EST LOW 20 MIN: CPT

## 2025-08-14 ENCOUNTER — APPOINTMENT (OUTPATIENT)
Dept: FAMILY MEDICINE | Facility: CLINIC | Age: 80
End: 2025-08-14

## 2025-08-14 VITALS
BODY MASS INDEX: 32.86 KG/M2 | WEIGHT: 163 LBS | HEART RATE: 77 BPM | SYSTOLIC BLOOD PRESSURE: 126 MMHG | OXYGEN SATURATION: 97 % | DIASTOLIC BLOOD PRESSURE: 72 MMHG | HEIGHT: 59 IN

## 2025-08-14 RX ORDER — OMEPRAZOLE MAGNESIUM 20 MG/1
20 CAPSULE, DELAYED RELEASE ORAL
Refills: 0 | Status: ACTIVE | COMMUNITY